# Patient Record
Sex: FEMALE | Race: WHITE | Employment: PART TIME | URBAN - METROPOLITAN AREA
[De-identification: names, ages, dates, MRNs, and addresses within clinical notes are randomized per-mention and may not be internally consistent; named-entity substitution may affect disease eponyms.]

---

## 2019-11-06 ENCOUNTER — IP HISTORICAL/CONVERTED ENCOUNTER (OUTPATIENT)
Dept: OTHER | Age: 64
End: 2019-11-06

## 2021-08-19 ENCOUNTER — APPOINTMENT (OUTPATIENT)
Dept: GENERAL RADIOLOGY | Age: 66
DRG: 177 | End: 2021-08-19
Attending: EMERGENCY MEDICINE
Payer: MEDICARE

## 2021-08-19 ENCOUNTER — APPOINTMENT (OUTPATIENT)
Dept: CT IMAGING | Age: 66
DRG: 177 | End: 2021-08-19
Attending: FAMILY MEDICINE
Payer: MEDICARE

## 2021-08-19 ENCOUNTER — HOSPITAL ENCOUNTER (INPATIENT)
Age: 66
LOS: 7 days | Discharge: HOME | DRG: 177 | End: 2021-08-26
Attending: EMERGENCY MEDICINE | Admitting: FAMILY MEDICINE
Payer: MEDICARE

## 2021-08-19 DIAGNOSIS — U07.1 COVID-19: ICD-10-CM

## 2021-08-19 DIAGNOSIS — R09.02 HYPOXIA: Primary | ICD-10-CM

## 2021-08-19 LAB
ALBUMIN SERPL-MCNC: 3 G/DL (ref 3.5–5)
ALBUMIN/GLOB SERPL: 0.8 {RATIO} (ref 1.1–2.2)
ALP SERPL-CCNC: 52 U/L (ref 45–117)
ALT SERPL-CCNC: 29 U/L (ref 12–78)
ANION GAP SERPL CALC-SCNC: 5 MMOL/L (ref 5–15)
APPEARANCE UR: ABNORMAL
ARTERIAL PATENCY WRIST A: ABNORMAL
AST SERPL W P-5'-P-CCNC: 34 U/L (ref 15–37)
BACTERIA URNS QL MICRO: NEGATIVE /HPF
BACTERIA URNS QL MICRO: NEGATIVE /HPF
BASE EXCESS BLDA CALC-SCNC: 3.5 MMOL/L (ref 0–2)
BASOPHILS # BLD: 0.1 K/UL (ref 0–0.1)
BASOPHILS NFR BLD: 1 % (ref 0–1)
BDY SITE: ABNORMAL
BILIRUB SERPL-MCNC: 0.7 MG/DL (ref 0.2–1)
BILIRUB UR QL: NEGATIVE
BUN SERPL-MCNC: 11 MG/DL (ref 6–20)
BUN/CREAT SERPL: 13 (ref 12–20)
CA-I BLD-MCNC: 8.6 MG/DL (ref 8.5–10.1)
CHLORIDE SERPL-SCNC: 106 MMOL/L (ref 97–108)
CO2 SERPL-SCNC: 29 MMOL/L (ref 21–32)
COLOR UR: YELLOW
CREAT SERPL-MCNC: 0.83 MG/DL (ref 0.55–1.02)
DIFFERENTIAL METHOD BLD: NORMAL
EOSINOPHIL # BLD: 0.1 K/UL (ref 0–0.4)
EOSINOPHIL NFR BLD: 1 % (ref 0–7)
EPAP/CPAP/PEEP, PAPEEP: 0
ERYTHROCYTE [DISTWIDTH] IN BLOOD BY AUTOMATED COUNT: 12.6 % (ref 11.5–14.5)
FIO2 ON VENT: 100 %
GAS FLOW.O2 O2 DELIVERY SYS: 15 L/MIN
GLOBULIN SER CALC-MCNC: 3.7 G/DL (ref 2–4)
GLUCOSE SERPL-MCNC: 105 MG/DL (ref 65–100)
GLUCOSE UR STRIP.AUTO-MCNC: NEGATIVE MG/DL
HCO3 BLDA-SCNC: 28 MMOL/L (ref 22–26)
HCT VFR BLD AUTO: 43.2 % (ref 35–47)
HGB BLD-MCNC: 14.2 G/DL (ref 11.5–16)
HGB UR QL STRIP: NEGATIVE
IMM GRANULOCYTES # BLD AUTO: 0 K/UL
IMM GRANULOCYTES NFR BLD AUTO: 0 %
KETONES UR QL STRIP.AUTO: NEGATIVE MG/DL
LACTATE SERPL-SCNC: 1.9 MMOL/L (ref 0.4–2)
LEUKOCYTE ESTERASE UR QL STRIP.AUTO: NEGATIVE
LYMPHOCYTES # BLD: 0.9 K/UL (ref 0.8–3.5)
LYMPHOCYTES NFR BLD: 17 % (ref 12–49)
MCH RBC QN AUTO: 29 PG (ref 26–34)
MCHC RBC AUTO-ENTMCNC: 32.9 G/DL (ref 30–36.5)
MCV RBC AUTO: 88.3 FL (ref 80–99)
MONOCYTES # BLD: 0.4 K/UL (ref 0–1)
MONOCYTES NFR BLD: 8 % (ref 5–13)
MUCOUS THREADS URNS QL MICRO: ABNORMAL /LPF
MUCOUS THREADS URNS QL MICRO: ABNORMAL /LPF
NEUTS BAND NFR BLD MANUAL: 4 % (ref 0–6)
NEUTS SEG # BLD: 3.8 K/UL (ref 1.8–8)
NEUTS SEG NFR BLD: 69 % (ref 32–75)
NITRITE UR QL STRIP.AUTO: NEGATIVE
NRBC # BLD: 0 K/UL (ref 0–0.01)
NRBC BLD-RTO: 0 PER 100 WBC
PCO2 BLDA: 40 MMHG (ref 35–45)
PH BLDA: 7.45 [PH] (ref 7.35–7.45)
PH UR STRIP: 5 [PH] (ref 5–8)
PLATELET # BLD AUTO: 269 K/UL (ref 150–400)
PMV BLD AUTO: 10.1 FL (ref 8.9–12.9)
PO2 BLDA: 105 MMHG (ref 75–100)
POTASSIUM SERPL-SCNC: 3.6 MMOL/L (ref 3.5–5.1)
PROT SERPL-MCNC: 6.7 G/DL (ref 6.4–8.2)
PROT UR STRIP-MCNC: 100 MG/DL
RBC # BLD AUTO: 4.89 M/UL (ref 3.8–5.2)
RBC #/AREA URNS HPF: ABNORMAL /HPF (ref 0–5)
RBC #/AREA URNS HPF: ABNORMAL /HPF (ref 0–5)
RBC MORPH BLD: NORMAL
SAO2 % BLD: 99 %
SAO2% DEVICE SAO2% SENSOR NAME: ABNORMAL
SODIUM SERPL-SCNC: 140 MMOL/L (ref 136–145)
SP GR UR REFRACTOMETRY: 1.02 (ref 1–1.03)
UROBILINOGEN UR QL STRIP.AUTO: 4 EU/DL (ref 0.1–1)
WBC # BLD AUTO: 5.3 K/UL (ref 3.6–11)
WBC MORPH BLD: NORMAL
WBC URNS QL MICRO: ABNORMAL /HPF (ref 0–4)
WBC URNS QL MICRO: ABNORMAL /HPF (ref 0–4)

## 2021-08-19 PROCEDURE — 3E0333Z INTRODUCTION OF ANTI-INFLAMMATORY INTO PERIPHERAL VEIN, PERCUTANEOUS APPROACH: ICD-10-PCS | Performed by: FAMILY MEDICINE

## 2021-08-19 PROCEDURE — 87040 BLOOD CULTURE FOR BACTERIA: CPT

## 2021-08-19 PROCEDURE — 74011000250 HC RX REV CODE- 250: Performed by: EMERGENCY MEDICINE

## 2021-08-19 PROCEDURE — 82803 BLOOD GASES ANY COMBINATION: CPT

## 2021-08-19 PROCEDURE — 74011000636 HC RX REV CODE- 636: Performed by: FAMILY MEDICINE

## 2021-08-19 PROCEDURE — 71045 X-RAY EXAM CHEST 1 VIEW: CPT

## 2021-08-19 PROCEDURE — 74011000250 HC RX REV CODE- 250: Performed by: FAMILY MEDICINE

## 2021-08-19 PROCEDURE — 74011250636 HC RX REV CODE- 250/636: Performed by: FAMILY MEDICINE

## 2021-08-19 PROCEDURE — 96374 THER/PROPH/DIAG INJ IV PUSH: CPT

## 2021-08-19 PROCEDURE — 96375 TX/PRO/DX INJ NEW DRUG ADDON: CPT

## 2021-08-19 PROCEDURE — 74011000258 HC RX REV CODE- 258: Performed by: FAMILY MEDICINE

## 2021-08-19 PROCEDURE — 83605 ASSAY OF LACTIC ACID: CPT

## 2021-08-19 PROCEDURE — 80053 COMPREHEN METABOLIC PANEL: CPT

## 2021-08-19 PROCEDURE — 85025 COMPLETE CBC W/AUTO DIFF WBC: CPT

## 2021-08-19 PROCEDURE — XW033E5 INTRODUCTION OF REMDESIVIR ANTI-INFECTIVE INTO PERIPHERAL VEIN, PERCUTANEOUS APPROACH, NEW TECHNOLOGY GROUP 5: ICD-10-PCS | Performed by: FAMILY MEDICINE

## 2021-08-19 PROCEDURE — 36415 COLL VENOUS BLD VENIPUNCTURE: CPT

## 2021-08-19 PROCEDURE — 81001 URINALYSIS AUTO W/SCOPE: CPT

## 2021-08-19 PROCEDURE — 65270000029 HC RM PRIVATE

## 2021-08-19 PROCEDURE — 99284 EMERGENCY DEPT VISIT MOD MDM: CPT

## 2021-08-19 PROCEDURE — 94761 N-INVAS EAR/PLS OXIMETRY MLT: CPT

## 2021-08-19 PROCEDURE — 71275 CT ANGIOGRAPHY CHEST: CPT

## 2021-08-19 PROCEDURE — 36600 WITHDRAWAL OF ARTERIAL BLOOD: CPT

## 2021-08-19 PROCEDURE — 74011250636 HC RX REV CODE- 250/636: Performed by: EMERGENCY MEDICINE

## 2021-08-19 RX ORDER — ACETAMINOPHEN 650 MG/1
660 SUPPOSITORY RECTAL
Status: DISCONTINUED | OUTPATIENT
Start: 2021-08-19 | End: 2021-08-26 | Stop reason: HOSPADM

## 2021-08-19 RX ORDER — POLYETHYLENE GLYCOL 3350 17 G/17G
17 POWDER, FOR SOLUTION ORAL DAILY PRN
Status: DISCONTINUED | OUTPATIENT
Start: 2021-08-19 | End: 2021-08-26 | Stop reason: HOSPADM

## 2021-08-19 RX ORDER — ONDANSETRON 2 MG/ML
4 INJECTION INTRAMUSCULAR; INTRAVENOUS
Status: DISCONTINUED | OUTPATIENT
Start: 2021-08-19 | End: 2021-08-26 | Stop reason: HOSPADM

## 2021-08-19 RX ORDER — DEXAMETHASONE SODIUM PHOSPHATE 4 MG/ML
4 INJECTION, SOLUTION INTRA-ARTICULAR; INTRALESIONAL; INTRAMUSCULAR; INTRAVENOUS; SOFT TISSUE EVERY 6 HOURS
Status: DISCONTINUED | OUTPATIENT
Start: 2021-08-19 | End: 2021-08-25

## 2021-08-19 RX ORDER — ENOXAPARIN SODIUM 100 MG/ML
40 INJECTION SUBCUTANEOUS DAILY
Status: DISCONTINUED | OUTPATIENT
Start: 2021-08-20 | End: 2021-08-26 | Stop reason: HOSPADM

## 2021-08-19 RX ORDER — DOXYCYCLINE 100 MG/1
1 CAPSULE ORAL 2 TIMES DAILY
COMMUNITY
Start: 2021-08-15 | End: 2021-08-26

## 2021-08-19 RX ORDER — ACETAMINOPHEN 325 MG/1
650 TABLET ORAL
Status: DISCONTINUED | OUTPATIENT
Start: 2021-08-19 | End: 2021-08-26 | Stop reason: HOSPADM

## 2021-08-19 RX ORDER — ONDANSETRON 4 MG/1
4 TABLET, ORALLY DISINTEGRATING ORAL
Status: DISCONTINUED | OUTPATIENT
Start: 2021-08-19 | End: 2021-08-26 | Stop reason: HOSPADM

## 2021-08-19 RX ORDER — AZITHROMYCIN 500 MG/1
500 TABLET, FILM COATED ORAL DAILY
Status: DISCONTINUED | OUTPATIENT
Start: 2021-08-20 | End: 2021-08-26 | Stop reason: HOSPADM

## 2021-08-19 RX ORDER — SODIUM CHLORIDE 9 MG/ML
25 INJECTION, SOLUTION INTRAVENOUS CONTINUOUS
Status: DISCONTINUED | OUTPATIENT
Start: 2021-08-19 | End: 2021-08-26 | Stop reason: HOSPADM

## 2021-08-19 RX ADMIN — SODIUM CHLORIDE 75 ML/HR: 9 INJECTION, SOLUTION INTRAVENOUS at 18:30

## 2021-08-19 RX ADMIN — REMDESIVIR 200 MG: 100 INJECTION, POWDER, LYOPHILIZED, FOR SOLUTION INTRAVENOUS at 22:01

## 2021-08-19 RX ADMIN — AZITHROMYCIN 500 MG: 500 INJECTION, POWDER, LYOPHILIZED, FOR SOLUTION INTRAVENOUS at 13:55

## 2021-08-19 RX ADMIN — METHYLPREDNISOLONE SODIUM SUCCINATE 125 MG: 125 INJECTION, POWDER, FOR SOLUTION INTRAMUSCULAR; INTRAVENOUS at 13:56

## 2021-08-19 RX ADMIN — DEXAMETHASONE SODIUM PHOSPHATE 4 MG: 4 INJECTION, SOLUTION INTRA-ARTICULAR; INTRALESIONAL; INTRAMUSCULAR; INTRAVENOUS; SOFT TISSUE at 18:29

## 2021-08-19 RX ADMIN — IOPAMIDOL 100 ML: 755 INJECTION, SOLUTION INTRAVENOUS at 16:07

## 2021-08-19 RX ADMIN — CEFTRIAXONE SODIUM: 1 INJECTION, POWDER, FOR SOLUTION INTRAMUSCULAR; INTRAVENOUS at 13:56

## 2021-08-19 NOTE — Clinical Note
Status[de-identified] INPATIENT [101]   Type of Bed: Telemetry [19]   Cardiac Monitoring Required?: Yes   Inpatient Hospitalization Certified Necessary for the Following Reasons: 9.  Other (further clarification in H&P documentation)   Admitting Diagnosis: Hypoxia [[id number]]   Admitting Physician: García Thomas [[id number]]   Attending Physician: García Thomas [[id number]]   Estimated Length of Stay: 2 Midnights   Discharge Plan[de-identified] Home with Office Follow-up

## 2021-08-19 NOTE — ED PROVIDER NOTES
EMERGENCY DEPARTMENT HISTORY AND PHYSICAL EXAM      Date: 8/19/2021  Patient Name: Lissy Campbell      History of Presenting Illness     Chief Complaint   Patient presents with    Positive For Covid-19       History Provided By: Patient    HPI: Lissy Campbell, 72 y.o. female with a past medical history significant Diagnosis of Covid presents to the ED with cc of chief complaint of shortness of breath that is progressively getting worse. Patient had oxygen saturations in the 70s. She was placed on doxycycline by urgent care 2 days prior to arrival.  Symptoms are getting worse and are made worse with activity. She denies any fever, chills, nausea, vomiting, chest pain, rash, diarrhea, headache, night sweats. Symptoms are moderate to severe at this point    There are no other complaints, changes, or physical findings at this time.     PCP: None    Current Facility-Administered Medications   Medication Dose Route Frequency Provider Last Rate Last Admin    amLODIPine (NORVASC) tablet 10 mg  10 mg Oral DAILY Anita De MD   10 mg at 08/24/21 0857    lisinopriL (PRINIVIL, ZESTRIL) tablet 10 mg  10 mg Oral DAILY Anita De MD   10 mg at 08/24/21 0858    melatonin tablet 3 mg  3 mg Oral QHS Stefan SWANSON MD   3 mg at 08/23/21 2226    benzonatate (TESSALON) capsule 200 mg  200 mg Oral TID PRN Stefan SWANSON MD        0.9% sodium chloride infusion  25 mL/hr IntraVENous CONTINUOUS AllEm jj MD 25 mL/hr at 08/22/21 0955 25 mL/hr at 08/22/21 0955    acetaminophen (TYLENOL) tablet 650 mg  650 mg Oral Q6H PRN Hernan De MD   650 mg at 08/20/21 1349    Or    acetaminophen (TYLENOL) suppository 660 mg  660 mg Rectal Q6H PRN Hernan De MD        polyethylene glycol (MIRALAX) packet 17 g  17 g Oral DAILY PRN Hernan De MD        ondansetron (ZOFRAN ODT) tablet 4 mg  4 mg Oral Q8H PRN Anita De MD   4 mg at 08/20/21 0955    Or    ondansetron (ZOFRAN) injection 4 mg  4 mg IntraVENous Q6H PRN Scott De MD        enoxaparin (LOVENOX) injection 40 mg  40 mg SubCUTAneous DAILY Anita De MD   40 mg at 08/24/21 0859    dexamethasone (DECADRON) 4 mg/mL injection 4 mg  4 mg IntraVENous Q6H Anita De MD   4 mg at 08/24/21 0514    azithromycin (ZITHROMAX) tablet 500 mg  500 mg Oral DAILY Anita De MD   500 mg at 08/24/21 0858    piperacillin-tazobactam (ZOSYN) 3.375 g in 0.9% sodium chloride (MBP/ADV) 100 mL MBP  3.375 g IntraVENous Q8H Anita De MD 25 mL/hr at 08/24/21 0514 3.375 g at 08/24/21 0514    remdesivir 100 mg in 0.9% sodium chloride 250 mL IVPB  100 mg IntraVENous Q24H Anita De  mL/hr at 08/23/21 2225 100 mg at 08/23/21 2225       Past History     Past Medical History:  History reviewed. No pertinent past medical history. Past Surgical History:  History reviewed. No pertinent surgical history. Family History:  History reviewed. No pertinent family history. Social History:  Social History     Tobacco Use    Smoking status: Never Smoker    Smokeless tobacco: Never Used   Substance Use Topics    Alcohol use: Not Currently    Drug use: Never       Allergies:  No Known Allergies      Review of Systems     Review of Systems   Constitutional: Negative. Negative for appetite change, chills, fatigue and fever. HENT: Negative. Negative for congestion and sinus pain. Eyes: Negative. Negative for pain and visual disturbance. Respiratory: Positive for shortness of breath. Negative for chest tightness. Cardiovascular: Negative. Negative for chest pain. Gastrointestinal: Negative. Negative for abdominal pain, diarrhea, nausea and vomiting. Genitourinary: Negative. Negative for difficulty urinating. No discharge   Musculoskeletal: Negative. Negative for arthralgias. Skin: Negative. Negative for rash. Neurological: Negative. Negative for weakness and headaches.    Hematological: Negative. Psychiatric/Behavioral: Negative. Negative for agitation. The patient is not nervous/anxious. All other systems reviewed and are negative. Physical Exam     Physical Exam    Lab and Diagnostic Study Results     Labs -   No results found for this or any previous visit (from the past 12 hour(s)). Radiologic Studies -   [unfilled]  CT Results  (Last 48 hours)    None        CXR Results  (Last 48 hours)    None          Medical Decision Making and ED Course   - I am the first and primary provider for this patient AND AM THE PRIMARY PROVIDER OF RECORD. - I reviewed the vital signs, available nursing notes, past medical history, past surgical history, family history and social history. - Initial assessment performed. The patients presenting problems have been discussed, and the staff are in agreement with the care plan formulated and outlined with them. I have encouraged them to ask questions as they arise throughout their visit. Vital Signs-Reviewed the patient's vital signs. Patient Vitals for the past 12 hrs:   Temp Pulse Resp BP SpO2   08/24/21 0858 98.1 °F (36.7 °C) 75 18 (!) 149/78 98 %   08/24/21 0857  75  (!) 149/78    08/24/21 0746     92 %         Records Reviewed: Nursing Notes  Presents with a diagnosis of Covid and is now hypoxic. She is on 15 L nonrebreather. Will begin sepsis work-up treating her with community-acquired pneumonia antibiotics and steroids and will defer to inpatient team for antivirals if needed.     CRITICAL CARE NOTE :    1:43 PM      IMPENDING DETERIORATION -Respiratory and Cardiovascular    ASSOCIATED RISK FACTORS - Hypoxia and Metabolic changes    MANAGEMENT- Bedside Assessment and Supervision of Care    INTERPRETATION -  Blood Gases, ECG, Blood Pressure and Cardiac Output Measures     INTERVENTIONS - hemodynamic mngmt and Metobolic interventions    CASE REVIEW - Hospitalist/Intensivist    TREATMENT RESPONSE -Stable    PERFORMED BY - Self        NOTES   :      I have spent 45 minutes of critical care time involved in lab review, consultations with specialist, family decision- making, bedside attention and documentation. During this entire length of time I was immediately available to the patient . Chau Sanchez MD    ED Course:              Provider Notes (Medical Decision Making):   Patient is a 77-year-old female who presents hypoxic with a positive diagnosis of Covid. She has not been vaccinated. She said significant increase in respiratory distress and advancement of her oxygen supplementation. MDM           Consultations:       Consultations: -  Hospitalist Consultant: Dr. Emanuel Francis: We have asked for emergent assistance with regard to this patient. We have discussed the patients HPI, ROS, PE and results this far. They will come and evaluate the patient for admission. Procedures and Critical Care       Performed by: Chau Sanchez MD  PROCEDURES:  Procedures         Disposition     Disposition: Admitted to Floor Medical Floor the case was discussed with the admitting physician Dr. Emanuel Francis    Admitted  Diagnosis     Clinical Impression:   1. Hypoxia    2. COVID-19        Attestations:    Chau Sanchez MD    Please note that this dictation was completed with [employer], the computer voice recognition software. Quite often unanticipated grammatical, syntax, homophones, and other interpretive errors are inadvertently transcribed by the computer software. Please disregard these errors. Please excuse any errors that have escaped final proofreading. Thank you.

## 2021-08-19 NOTE — ED NOTES
Attempted to call report to [facility].  Stated that nurse had just gone into a pt's room and would call back once out.

## 2021-08-20 LAB
ALBUMIN SERPL-MCNC: 2.9 G/DL (ref 3.5–5)
ALBUMIN/GLOB SERPL: 0.8 {RATIO} (ref 1.1–2.2)
ALP SERPL-CCNC: 50 U/L (ref 45–117)
ALT SERPL-CCNC: 28 U/L (ref 12–78)
ANION GAP SERPL CALC-SCNC: 7 MMOL/L (ref 5–15)
AST SERPL W P-5'-P-CCNC: 27 U/L (ref 15–37)
BASOPHILS # BLD: 0 K/UL (ref 0–0.1)
BASOPHILS NFR BLD: 0 % (ref 0–1)
BILIRUB SERPL-MCNC: 0.6 MG/DL (ref 0.2–1)
BUN SERPL-MCNC: 11 MG/DL (ref 6–20)
BUN/CREAT SERPL: 13 (ref 12–20)
CA-I BLD-MCNC: 8.5 MG/DL (ref 8.5–10.1)
CHLORIDE SERPL-SCNC: 109 MMOL/L (ref 97–108)
CO2 SERPL-SCNC: 26 MMOL/L (ref 21–32)
CREAT SERPL-MCNC: 0.84 MG/DL (ref 0.55–1.02)
DIFFERENTIAL METHOD BLD: ABNORMAL
EOSINOPHIL # BLD: 0 K/UL (ref 0–0.4)
EOSINOPHIL NFR BLD: 0 % (ref 0–7)
ERYTHROCYTE [DISTWIDTH] IN BLOOD BY AUTOMATED COUNT: 12.4 % (ref 11.5–14.5)
GLOBULIN SER CALC-MCNC: 3.8 G/DL (ref 2–4)
GLUCOSE SERPL-MCNC: 164 MG/DL (ref 65–100)
HCT VFR BLD AUTO: 43.3 % (ref 35–47)
HGB BLD-MCNC: 14.1 G/DL (ref 11.5–16)
IMM GRANULOCYTES # BLD AUTO: 0 K/UL
IMM GRANULOCYTES NFR BLD AUTO: 0 %
LYMPHOCYTES # BLD: 1.1 K/UL (ref 0.8–3.5)
LYMPHOCYTES NFR BLD: 16 % (ref 12–49)
MCH RBC QN AUTO: 28.8 PG (ref 26–34)
MCHC RBC AUTO-ENTMCNC: 32.6 G/DL (ref 30–36.5)
MCV RBC AUTO: 88.4 FL (ref 80–99)
MONOCYTES # BLD: 0.4 K/UL (ref 0–1)
MONOCYTES NFR BLD: 5 % (ref 5–13)
NEUTS SEG # BLD: 5.6 K/UL (ref 1.8–8)
NEUTS SEG NFR BLD: 79 % (ref 32–75)
NRBC # BLD: 0 K/UL (ref 0–0.01)
NRBC BLD-RTO: 0 PER 100 WBC
PLATELET # BLD AUTO: 298 K/UL (ref 150–400)
PMV BLD AUTO: 10.4 FL (ref 8.9–12.9)
POTASSIUM SERPL-SCNC: 4 MMOL/L (ref 3.5–5.1)
PROT SERPL-MCNC: 6.7 G/DL (ref 6.4–8.2)
RBC # BLD AUTO: 4.9 M/UL (ref 3.8–5.2)
RBC MORPH BLD: ABNORMAL
SODIUM SERPL-SCNC: 142 MMOL/L (ref 136–145)
WBC # BLD AUTO: 7.1 K/UL (ref 3.6–11)

## 2021-08-20 PROCEDURE — 36415 COLL VENOUS BLD VENIPUNCTURE: CPT

## 2021-08-20 PROCEDURE — 74011000258 HC RX REV CODE- 258: Performed by: FAMILY MEDICINE

## 2021-08-20 PROCEDURE — 74011250637 HC RX REV CODE- 250/637: Performed by: FAMILY MEDICINE

## 2021-08-20 PROCEDURE — 85025 COMPLETE CBC W/AUTO DIFF WBC: CPT

## 2021-08-20 PROCEDURE — 77010033678 HC OXYGEN DAILY

## 2021-08-20 PROCEDURE — 74011000250 HC RX REV CODE- 250: Performed by: FAMILY MEDICINE

## 2021-08-20 PROCEDURE — 65270000029 HC RM PRIVATE

## 2021-08-20 PROCEDURE — 94760 N-INVAS EAR/PLS OXIMETRY 1: CPT

## 2021-08-20 PROCEDURE — 80053 COMPREHEN METABOLIC PANEL: CPT

## 2021-08-20 PROCEDURE — 74011250636 HC RX REV CODE- 250/636: Performed by: FAMILY MEDICINE

## 2021-08-20 RX ADMIN — DEXAMETHASONE SODIUM PHOSPHATE 4 MG: 4 INJECTION, SOLUTION INTRA-ARTICULAR; INTRALESIONAL; INTRAMUSCULAR; INTRAVENOUS; SOFT TISSUE at 23:45

## 2021-08-20 RX ADMIN — ENOXAPARIN SODIUM 40 MG: 40 INJECTION SUBCUTANEOUS at 09:55

## 2021-08-20 RX ADMIN — PIPERACILLIN SODIUM AND TAZOBACTAM SODIUM 3.38 G: 3; .375 INJECTION, POWDER, LYOPHILIZED, FOR SOLUTION INTRAVENOUS at 09:55

## 2021-08-20 RX ADMIN — DEXAMETHASONE SODIUM PHOSPHATE 4 MG: 4 INJECTION, SOLUTION INTRA-ARTICULAR; INTRALESIONAL; INTRAMUSCULAR; INTRAVENOUS; SOFT TISSUE at 12:14

## 2021-08-20 RX ADMIN — PIPERACILLIN SODIUM AND TAZOBACTAM SODIUM 3.38 G: 3; .375 INJECTION, POWDER, LYOPHILIZED, FOR SOLUTION INTRAVENOUS at 17:00

## 2021-08-20 RX ADMIN — DEXAMETHASONE SODIUM PHOSPHATE 4 MG: 4 INJECTION, SOLUTION INTRA-ARTICULAR; INTRALESIONAL; INTRAMUSCULAR; INTRAVENOUS; SOFT TISSUE at 17:00

## 2021-08-20 RX ADMIN — PIPERACILLIN SODIUM AND TAZOBACTAM SODIUM 3.38 G: 3; .375 INJECTION, POWDER, LYOPHILIZED, FOR SOLUTION INTRAVENOUS at 00:31

## 2021-08-20 RX ADMIN — ONDANSETRON 4 MG: 4 TABLET, ORALLY DISINTEGRATING ORAL at 09:55

## 2021-08-20 RX ADMIN — AZITHROMYCIN MONOHYDRATE 500 MG: 500 TABLET ORAL at 09:55

## 2021-08-20 RX ADMIN — REMDESIVIR 200 MG: 100 INJECTION, POWDER, LYOPHILIZED, FOR SOLUTION INTRAVENOUS at 21:38

## 2021-08-20 RX ADMIN — ACETAMINOPHEN 650 MG: 325 TABLET ORAL at 09:55

## 2021-08-20 RX ADMIN — PIPERACILLIN SODIUM AND TAZOBACTAM SODIUM 3.38 G: 3; .375 INJECTION, POWDER, LYOPHILIZED, FOR SOLUTION INTRAVENOUS at 23:44

## 2021-08-20 RX ADMIN — DEXAMETHASONE SODIUM PHOSPHATE 4 MG: 4 INJECTION, SOLUTION INTRA-ARTICULAR; INTRALESIONAL; INTRAMUSCULAR; INTRAVENOUS; SOFT TISSUE at 05:22

## 2021-08-20 RX ADMIN — DEXAMETHASONE SODIUM PHOSPHATE 4 MG: 4 INJECTION, SOLUTION INTRA-ARTICULAR; INTRALESIONAL; INTRAMUSCULAR; INTRAVENOUS; SOFT TISSUE at 00:30

## 2021-08-20 NOTE — PROGRESS NOTES
PULMONARY CONSULT  [facility]    Name: Ilya Street MRN: [id number]   : 1955 Hospital: [address] [place] [address]   Date: 2021  Admission date: 2021 Hospital Day: 2       HPI:     Hospital Problems  Never Reviewed        Codes Class Noted POA    Hypoxia ICD-10-CM: R09.02  ICD-9-CM: 799.02  2021 Unknown        COVID-19 ICD-10-CM: U07.1  ICD-9-CM: 079.89  2021 Unknown                   [x] High complexity decision making was performed  [x] See my orders for details      Subjective/Initial History:     I was asked by Gerald Elliott MD to see Ilya Street  a 72 y.o.  female in consultation     Excerpts from admission 2021 or consult notes as follows:   Ms. Gerald Prince is a 73 yo F came to the ER d/t SOB, fever, fatigue and generalised weakness. Pt was admitted for COVID19 Pneumonitis -  CXR shows B/L peripheral infiltrates consistent w/ diagnosis. Pulmo consulted for Management. Pt seen in room  Alert awake says she has mild chest tightness but much improved from admission O2sat 93% on O2 -5L     No Known Allergies     MAR reviewed and pertinent medications noted or modified as needed     Current Facility-Administered Medications   Medication    0.9% sodium chloride infusion    acetaminophen (TYLENOL) tablet 650 mg    Or    acetaminophen (TYLENOL) suppository 660 mg    polyethylene glycol (MIRALAX) packet 17 g    ondansetron (ZOFRAN ODT) tablet 4 mg    Or    ondansetron (ZOFRAN) injection 4 mg    enoxaparin (LOVENOX) injection 40 mg    dexamethasone (DECADRON) 4 mg/mL injection 4 mg    azithromycin (ZITHROMAX) tablet 500 mg    piperacillin-tazobactam (ZOSYN) 3.375 g in 0.9% sodium chloride (MBP/ADV) 100 mL MBP    remdesivir 200 mg in 0.9% sodium chloride 250 mL IVPB    remdesivir 100 mg in 0.9% sodium chloride 250 mL IVPB      Patient PCP: None  PMH:  has no past medical history on file. PSH:   has no past surgical history on file. FHX: family history is not on file. SHX:  reports that she has never smoked. She has never used smokeless tobacco. She reports previous alcohol use. She reports that she does not use drugs. ROS:    ROS     Objective:     Vital Signs: Telemetry:    normal sinus rhythm Intake/Output:   Visit Vitals  BP (!) 140/73   Pulse 79   Temp 98.6 °F (37 °C)   Resp 16   Ht 5' 8\" (1.727 m)   Wt 311 lb (141.1 kg)   SpO2 93%   BMI 47.29 kg/m²       Temp (24hrs), Av °F (36.7 °C), Min:97.2 °F (36.2 °C), Max:98.6 °F (37 °C)        O2 Device: Non-rebreather mask O2 Flow Rate (L/min): 10 l/min       Wt Readings from Last 4 Encounters:   21 311 lb (141.1 kg)        No intake or output data in the 24 hours ending 21 1017    Last shift:      No intake/output data recorded. Last 3 shifts: No intake/output data recorded. Physical Exam:     Physical Exam     Labs:    Recent Labs     21  0800 21  1340   WBC 7.1 5.3   HGB 14.1 14.2    269     Recent Labs     21  0800 21  1340    140   K 4.0 3.6   * 106   CO2 26 29   * 105*   BUN 11 11   CREA 0.84 0.83   CA 8.5 8.6   LAC  --  1.9   ALB 2.9* 3.0*   ALT 28 29     Recent Labs     21  1705   PH 7.45   PCO2 40   PO2 105*   HCO3 28*   FIO2 100.0     No results for input(s): CPK, CKNDX, TROIQ in the last 72 hours. No lab exists for component: CPKMB  No results found for: BNPP, BNP   Lab Results   Component Value Date/Time    Culture result: No growth after 20 hours 2021 01:40 PM   No results found for: TSH, TSHEXT    Imaging:    CXR Results  (Last 48 hours)               21 1354  XR CHEST PORT Final result    Impression:  Bilateral peripheral infiltrates consistent with Covid pneumonia. Narrative:  Portable upright radiograph of the chest 1:45 p.m. no prior study. INDICATION: Covid positive, hypoxia. Heart size is at the upper limits of normal with a tortuous aorta.  Bilateral peripheral infiltrates are noted consistent with history of Covid positive   status. No effusion or pneumothorax. No acute bony findings. Results from [facility] [place] encounter on 08/19/21    XR CHEST PORT    Narrative  Portable upright radiograph of the chest 1:45 p.m. no prior study. INDICATION: Covid positive, hypoxia. Heart size is at the upper limits of normal with a tortuous aorta. Bilateral  peripheral infiltrates are noted consistent with history of Covid positive  status. No effusion or pneumothorax. No acute bony findings. Impression  Bilateral peripheral infiltrates consistent with Covid pneumonia. Results from [place] [facility] encounter on 08/19/21    CTA CHEST W OR W WO CONT    Narrative  CTA chest.    Axial images are reviewed along with reformatted sagittal/coronal/MIP images. 100 mL Isovue 370 administered. Dose reduction: All CT scans at this facility are performed using dose reduction  optimization techniques as appropriate to a performed exam including the  following-  automated exposure control, adjustments of mA and/or Kv according to patient  size, or use of iterative reconstructive technique. Geographic groundglass opacities present throughout the lungs. Findings in  keeping with infectious/inflammatory process including viral etiologies. No  pleural effusion. Enhanced images demonstrate normal contrast opacification, normal caliber  pulmonary arterial trunk. Central pulmonary arterial branch vessels normally  opacify; no CT evidence for PE. Pulsatile artifact ascending thoracic aorta. Otherwise, thoracic aortic arch, great vessels traversing the superior  mediastinum, and ascending thoracic aorta normally opacify. Imaged content upper abdomen reveals 2 cm gallstone gallbladder lumen. Impression  Geographic groundglass opacities throughout the lungs. Consider  infectious/inflammatory process including viral etiologies.     No CT evidence for PE.    Cholelithiasis. IMPRESSION:   1. COVID Pneumonia -  8/19 CXR shows B/L peripheral infiltrates consistent w/dx  2. Acute hypoxic respiratory failure   3. Body mass index is 47.29 kg/m². 4. Pt is requiring Drug therapy requiring intensive monitoring for toxicity  5. Pt is stable but Prognosis guarded       RECOMMENDATIONS/PLAN:     1. BIPAP for non invasive ventilatory life support to prevent worsening respiratory acidosis  2. Nasal Cannula oxygen - O2sat 93% on O2 -5L    3. Supplemental O2 to keep sats > 93%  4. Remdesivir 200 mg in 0.9% sodium chloride 250 mL  5. Zosyn, Decadron, Zithromax   6. Aspiration precautions  7. Labs to follow electrolytes, renal function and and blood counts  8. Glucose monitoring and SSI  9. Bronchial hygiene with respiratory therapy techniques, bronchodilators  10. DVT, SUP prophylaxis- Levonox       This care involved high complexity medical decision making: I personally:  · Reviewed the flowsheet and previous days notes  · Reviewed and summarized records or history from previous days note or discussions with staff, family  · High Risk Drug therapy requiring intensive monitoring for toxicity: eg steroids, pressors, antibiotics  · Reviewed and/or ordered Clinical lab tests  · Reviewed images and/or ordered Radiology tests  · Reviewed the patients ECG / Telemetry  · Reviewed and/or adjusted NiPPV settings  · Called and arranged for Radiologic procedures or interventions  · performed or ordered Diagnostic endoscopies with identified risk factors.   · discussed my assessment/management with : Nursing, Hospitalist and Family for coordination of care          Atul Live

## 2021-08-20 NOTE — PROGRESS NOTES
Problem: Falls - Risk of  Goal: *Absence of Falls  Description: Document Serene Holman Fall Risk and appropriate interventions in the flowsheet. Outcome: Progressing Towards Goal  Note: Fall Risk Interventions:        Place the hospital bed in low position when a patient is resting in bed; raise bed to a comfortable height when the patient is transferring out of bed. Keep hospital bed brakes locked. Keep wheelchair wheel locks in \"locked\" position when stationary. Keep nonslip, comfortable, well-fitting footwear on the patient.

## 2021-08-20 NOTE — PROGRESS NOTES
General Daily Progress Note          Patient Name:   Mi Borrego       YOB: 1955       Age:  72 y.o.       Admit Date: 8/19/2021      Subjective:       Patient on Ventimask breathing little better           Objective:     Visit Vitals  /65   Pulse 66   Temp 98 °F (36.7 °C)   Resp 22   Ht 5' 8\" (1.727 m)   Wt 141.1 kg (311 lb)   SpO2 95%   BMI 47.29 kg/m²        Recent Results (from the past 24 hour(s))   URINALYSIS W/ RFLX MICROSCOPIC    Collection Time: 08/19/21  4:05 PM   Result Value Ref Range    Color Yellow      Appearance Turbid (A) Clear      Specific gravity 1.019 1.003 - 1.030      pH (UA) 5.0 5.0 - 8.0      Protein 100 (A) Negative mg/dL    Glucose Negative Negative mg/dL    Ketone Negative Negative mg/dL    Bilirubin Negative Negative      Blood Negative Negative      Urobilinogen 4.0 (H) 0.1 - 1.0 EU/dL    Nitrites Negative Negative      Leukocyte Esterase Negative Negative      WBC 0-4 0 - 4 /hpf    RBC 0-5 0 - 5 /hpf    Bacteria Negative Negative /hpf    Mucus Trace /lpf   URINE MICROSCOPIC    Collection Time: 08/19/21  4:05 PM   Result Value Ref Range    WBC 0-4 0 - 4 /hpf    RBC 0-5 0 - 5 /hpf    Bacteria Negative Negative /hpf    Mucus Trace (A) Negative /lpf   BLOOD GAS, ARTERIAL    Collection Time: 08/19/21  5:05 PM   Result Value Ref Range    pH 7.45 7.35 - 7.45      PCO2 40 35 - 45 mmHg    PO2 105 (H) 75 - 100 mmHg    O2 SAT 99 >95 %    BICARBONATE 28 (H) 22 - 26 mmol/L    BASE EXCESS 3.5 (H) 0 - 2 mmol/L    O2 METHOD Non-rebreathing mask      O2 FLOW RATE 15 L/min    FIO2 100.0 %    EPAP/CPAP/PEEP 0      SITE Right Brachial      ARCENIO'S TEST PASS     METABOLIC PANEL, COMPREHENSIVE    Collection Time: 08/20/21  8:00 AM   Result Value Ref Range    Sodium 142 136 - 145 mmol/L    Potassium 4.0 3.5 - 5.1 mmol/L    Chloride 109 (H) 97 - 108 mmol/L    CO2 26 21 - 32 mmol/L    Anion gap 7 5 - 15 mmol/L    Glucose 164 (H) 65 - 100 mg/dL    BUN 11 6 - 20 mg/dL    Creatinine 0. 84 0.55 - 1.02 mg/dL    BUN/Creatinine ratio 13 12 - 20      GFR est AA >60 >60 ml/min/1.73m2    GFR est non-AA >60 >60 ml/min/1.73m2    Calcium 8.5 8.5 - 10.1 mg/dL    Bilirubin, total 0.6 0.2 - 1.0 mg/dL    AST (SGOT) 27 15 - 37 U/L    ALT (SGPT) 28 12 - 78 U/L    Alk. phosphatase 50 45 - 117 U/L    Protein, total 6.7 6.4 - 8.2 g/dL    Albumin 2.9 (L) 3.5 - 5.0 g/dL    Globulin 3.8 2.0 - 4.0 g/dL    A-G Ratio 0.8 (L) 1.1 - 2.2     CBC WITH AUTOMATED DIFF    Collection Time: 08/20/21  8:00 AM   Result Value Ref Range    WBC 7.1 3.6 - 11.0 K/uL    RBC 4.90 3.80 - 5.20 M/uL    HGB 14.1 11.5 - 16.0 g/dL    HCT 43.3 35.0 - 47.0 %    MCV 88.4 80.0 - 99.0 FL    MCH 28.8 26.0 - 34.0 PG    MCHC 32.6 30.0 - 36.5 g/dL    RDW 12.4 11.5 - 14.5 %    PLATELET 054 449 - 979 K/uL    MPV 10.4 8.9 - 12.9 FL    NRBC 0.0 0.0  WBC    ABSOLUTE NRBC 0.00 0.00 - 0.01 K/uL    NEUTROPHILS 79 (H) 32 - 75 %    LYMPHOCYTES 16 12 - 49 %    MONOCYTES 5 5 - 13 %    EOSINOPHILS 0 0 - 7 %    BASOPHILS 0 0 - 1 %    IMMATURE GRANULOCYTES 0 %    ABS. NEUTROPHILS 5.6 1.8 - 8.0 K/UL    ABS. LYMPHOCYTES 1.1 0.8 - 3.5 K/UL    ABS. MONOCYTES 0.4 0.0 - 1.0 K/UL    ABS. EOSINOPHILS 0.0 0.0 - 0.4 K/UL    ABS. BASOPHILS 0.0 0.0 - 0.1 K/UL    ABS. IMM. GRANS. 0.0 K/UL    DF Manual      RBC COMMENTS Normocytic, Normochromic       [unfilled]      Review of Systems    Constitutional: Negative for chills and fever. HENT: Negative. Eyes: Negative. Respiratory: Negative. Cardiovascular: Negative. Gastrointestinal: Negative for abdominal pain and nausea. Skin: Negative. Neurological: Negative. Physical Exam:      Constitutional: pt is oriented to person, place, and time. HENT:   Head: Normocephalic and atraumatic. Eyes: Pupils are equal, round, and reactive to light. EOM are normal.   Cardiovascular: Normal rate, regular rhythm and normal heart sounds. Pulmonary/Chest: Breath sounds normal. No wheezes. No rales.    Exhibits no tenderness. Abdominal: Soft. Bowel sounds are normal. There is no abdominal tenderness. There is no rebound and no guarding. Musculoskeletal: Normal range of motion. Neurological: pt is alert and oriented to person, place, and time. CTA CHEST W OR W WO CONT   Final Result   Geographic groundglass opacities throughout the lungs. Consider   infectious/inflammatory process including viral etiologies. No CT evidence for PE. Cholelithiasis. XR CHEST PORT   Final Result   Bilateral peripheral infiltrates consistent with Covid pneumonia.            Recent Results (from the past 24 hour(s))   URINALYSIS W/ RFLX MICROSCOPIC    Collection Time: 08/19/21  4:05 PM   Result Value Ref Range    Color Yellow      Appearance Turbid (A) Clear      Specific gravity 1.019 1.003 - 1.030      pH (UA) 5.0 5.0 - 8.0      Protein 100 (A) Negative mg/dL    Glucose Negative Negative mg/dL    Ketone Negative Negative mg/dL    Bilirubin Negative Negative      Blood Negative Negative      Urobilinogen 4.0 (H) 0.1 - 1.0 EU/dL    Nitrites Negative Negative      Leukocyte Esterase Negative Negative      WBC 0-4 0 - 4 /hpf    RBC 0-5 0 - 5 /hpf    Bacteria Negative Negative /hpf    Mucus Trace /lpf   URINE MICROSCOPIC    Collection Time: 08/19/21  4:05 PM   Result Value Ref Range    WBC 0-4 0 - 4 /hpf    RBC 0-5 0 - 5 /hpf    Bacteria Negative Negative /hpf    Mucus Trace (A) Negative /lpf   BLOOD GAS, ARTERIAL    Collection Time: 08/19/21  5:05 PM   Result Value Ref Range    pH 7.45 7.35 - 7.45      PCO2 40 35 - 45 mmHg    PO2 105 (H) 75 - 100 mmHg    O2 SAT 99 >95 %    BICARBONATE 28 (H) 22 - 26 mmol/L    BASE EXCESS 3.5 (H) 0 - 2 mmol/L    O2 METHOD Non-rebreathing mask      O2 FLOW RATE 15 L/min    FIO2 100.0 %    EPAP/CPAP/PEEP 0      SITE Right Brachial      ARCENIO'S TEST PASS     METABOLIC PANEL, COMPREHENSIVE    Collection Time: 08/20/21  8:00 AM   Result Value Ref Range    Sodium 142 136 - 145 mmol/L    Potassium 4.0 3.5 - 5.1 mmol/L    Chloride 109 (H) 97 - 108 mmol/L    CO2 26 21 - 32 mmol/L    Anion gap 7 5 - 15 mmol/L    Glucose 164 (H) 65 - 100 mg/dL    BUN 11 6 - 20 mg/dL    Creatinine 0.84 0.55 - 1.02 mg/dL    BUN/Creatinine ratio 13 12 - 20      GFR est AA >60 >60 ml/min/1.73m2    GFR est non-AA >60 >60 ml/min/1.73m2    Calcium 8.5 8.5 - 10.1 mg/dL    Bilirubin, total 0.6 0.2 - 1.0 mg/dL    AST (SGOT) 27 15 - 37 U/L    ALT (SGPT) 28 12 - 78 U/L    Alk. phosphatase 50 45 - 117 U/L    Protein, total 6.7 6.4 - 8.2 g/dL    Albumin 2.9 (L) 3.5 - 5.0 g/dL    Globulin 3.8 2.0 - 4.0 g/dL    A-G Ratio 0.8 (L) 1.1 - 2.2     CBC WITH AUTOMATED DIFF    Collection Time: 08/20/21  8:00 AM   Result Value Ref Range    WBC 7.1 3.6 - 11.0 K/uL    RBC 4.90 3.80 - 5.20 M/uL    HGB 14.1 11.5 - 16.0 g/dL    HCT 43.3 35.0 - 47.0 %    MCV 88.4 80.0 - 99.0 FL    MCH 28.8 26.0 - 34.0 PG    MCHC 32.6 30.0 - 36.5 g/dL    RDW 12.4 11.5 - 14.5 %    PLATELET 070 729 - 523 K/uL    MPV 10.4 8.9 - 12.9 FL    NRBC 0.0 0.0  WBC    ABSOLUTE NRBC 0.00 0.00 - 0.01 K/uL    NEUTROPHILS 79 (H) 32 - 75 %    LYMPHOCYTES 16 12 - 49 %    MONOCYTES 5 5 - 13 %    EOSINOPHILS 0 0 - 7 %    BASOPHILS 0 0 - 1 %    IMMATURE GRANULOCYTES 0 %    ABS. NEUTROPHILS 5.6 1.8 - 8.0 K/UL    ABS. LYMPHOCYTES 1.1 0.8 - 3.5 K/UL    ABS. MONOCYTES 0.4 0.0 - 1.0 K/UL    ABS. EOSINOPHILS 0.0 0.0 - 0.4 K/UL    ABS. BASOPHILS 0.0 0.0 - 0.1 K/UL    ABS. IMM.  GRANS. 0.0 K/UL    DF Manual      RBC COMMENTS Normocytic, Normochromic         Results     Procedure Component Value Units Date/Time    CULTURE, BLOOD #2 [[id number]] Collected: 08/19/21 2128    Order Status: Completed Specimen: Blood Updated: 08/19/21 2215    CULTURE, BLOOD #1 [[id number]] Collected: 08/19/21 2125    Order Status: Completed Specimen: Blood Updated: 08/19/21 2214    CULTURE, BLOOD, PAIRED [[id number]] Collected: 08/19/21 1340    Order Status: Completed Specimen: Blood Updated: 08/20/21 1010     Special Requests: No Special Requests        Culture result: No growth after 20 hours              Labs:     Recent Labs     08/20/21  0800 08/19/21  1340   WBC 7.1 5.3   HGB 14.1 14.2   HCT 43.3 43.2    269     Recent Labs     08/20/21  0800 08/19/21  1340    140   K 4.0 3.6   * 106   CO2 26 29   BUN 11 11   CREA 0.84 0.83   * 105*   CA 8.5 8.6     Recent Labs     08/20/21  0800 08/19/21  1340   ALT 28 29   AP 50 52   TBILI 0.6 0.7   TP 6.7 6.7   ALB 2.9* 3.0*   GLOB 3.8 3.7     No results for input(s): INR, PTP, APTT, INREXT in the last 72 hours. No results for input(s): FE, TIBC, PSAT, FERR in the last 72 hours. No results found for: FOL, RBCF   Recent Labs     08/19/21  1705   PH 7.45   PCO2 40   PO2 105*     No results for input(s): CPK, CKNDX, TROIQ in the last 72 hours.     No lab exists for component: CPKMB  No results found for: CHOL, CHOLX, CHLST, CHOLV, HDL, HDLP, LDL, LDLC, DLDLP, TGLX, TRIGL, TRIGP, CHHD, CHHDX  No results found for: [facility]  Lab Results   Component Value Date/Time    Color Yellow 08/19/2021 04:05 PM    Appearance Turbid (A) 08/19/2021 04:05 PM    Specific gravity 1.019 08/19/2021 04:05 PM    pH (UA) 5.0 08/19/2021 04:05 PM    Protein 100 (A) 08/19/2021 04:05 PM    Glucose Negative 08/19/2021 04:05 PM    Ketone Negative 08/19/2021 04:05 PM    Bilirubin Negative 08/19/2021 04:05 PM    Urobilinogen 4.0 (H) 08/19/2021 04:05 PM    Nitrites Negative 08/19/2021 04:05 PM    Leukocyte Esterase Negative 08/19/2021 04:05 PM    Bacteria Negative 08/19/2021 04:05 PM    Bacteria Negative 08/19/2021 04:05 PM    WBC 0-4 08/19/2021 04:05 PM    WBC 0-4 08/19/2021 04:05 PM    RBC 0-5 08/19/2021 04:05 PM    RBC 0-5 08/19/2021 04:05 PM         Assessment:       Acute hypoxemic respiratory failure  COVID-19 pneumonitis    Plan:       Continue Zithromax IV Zosyn remdesivir and Decadron    Follow-up with the pulmonology      Current Facility-Administered Medications:     0.9% sodium chloride infusion, 75 mL/hr, IntraVENous, CONTINUOUS, Anita De MD, Last Rate: 75 mL/hr at 08/19/21 1830, 75 mL/hr at 08/19/21 1830    acetaminophen (TYLENOL) tablet 650 mg, 650 mg, Oral, Q6H PRN, 650 mg at 08/20/21 0955 **OR** acetaminophen (TYLENOL) suppository 660 mg, 660 mg, Rectal, Q6H PRN, Mohiuddin, Laban Mcardle, MD    polyethylene glycol (MIRALAX) packet 17 g, 17 g, Oral, DAILY PRN, Mohiuddin, Laban Mcardle, MD    ondansetron (ZOFRAN ODT) tablet 4 mg, 4 mg, Oral, Q8H PRN, 4 mg at 08/20/21 0955 **OR** ondansetron (ZOFRAN) injection 4 mg, 4 mg, IntraVENous, Q6H PRN, Anita De MD    enoxaparin (LOVENOX) injection 40 mg, 40 mg, SubCUTAneous, DAILY, Anita De MD, 40 mg at 08/20/21 0955    dexamethasone (DECADRON) 4 mg/mL injection 4 mg, 4 mg, IntraVENous, Q6H, Anita De MD, 4 mg at 08/20/21 1214    azithromycin (ZITHROMAX) tablet 500 mg, 500 mg, Oral, DAILY, Anita De MD, 500 mg at 08/20/21 0955    piperacillin-tazobactam (ZOSYN) 3.375 g in 0.9% sodium chloride (MBP/ADV) 100 mL MBP, 3.375 g, IntraVENous, Q8H, Anita De MD, Last Rate: 25 mL/hr at 08/20/21 0955, 3.375 g at 08/20/21 0955    remdesivir 200 mg in 0.9% sodium chloride 250 mL IVPB, 200 mg, IntraVENous, Q24H, Anita De MD, Last Rate: 500 mL/hr at 08/19/21 2201, 200 mg at 08/19/21 2201    remdesivir 100 mg in 0.9% sodium chloride 250 mL IVPB, 100 mg, IntraVENous, Q24H, Mohiuddin, Laban Mcardle, MD

## 2021-08-20 NOTE — H&P
History and Physical    NAME: Jaylan Paredes   :  1955   MRN:  [id number]     Date/Time:  2021 9:47 PM    Patient PCP: None  ______________________________________________________________________             Subjective:     CHIEF COMPLAINT:   Shortness of breath      HISTORY OF PRESENT ILLNESS:       Patient is a 72y.o. year old female with no past medical history came to emergency room because of generalized weakness shortness of breath generalized weakness shortness of breath fever malaise patient was seen by the urgent care 2 days ago started on doxycycline but the condition get more worse came to emergency room seen by the ER physician, the patient to be admitted for COVID-19 pneumonitis    History reviewed. No pertinent past medical history. None    History reviewed. No pertinent surgical history. Social History     Tobacco Use    Smoking status: Never Smoker    Smokeless tobacco: Never Used   Substance Use Topics    Alcohol use: Not Currently        History reviewed. No pertinent family history. No Known Allergies     Prior to Admission medications    Medication Sig Start Date End Date Taking? Authorizing Provider   doxycycline (VIBRAMYCIN) 100 mg capsule Take 1 Capsule by mouth two (2) times a day.  8/15/21  Yes Provider, Historical         Current Facility-Administered Medications:     0.9% sodium chloride infusion, 75 mL/hr, IntraVENous, CONTINUOUS, Anita De MD, Last Rate: 75 mL/hr at 21 1830, 75 mL/hr at 21 1830    acetaminophen (TYLENOL) tablet 650 mg, 650 mg, Oral, Q6H PRN **OR** acetaminophen (TYLENOL) suppository 660 mg, 660 mg, Rectal, Q6H PRN, Anita De MD    polyethylene glycol (MIRALAX) packet 17 g, 17 g, Oral, DAILY PRN, Anita De MD    ondansetron (ZOFRAN ODT) tablet 4 mg, 4 mg, Oral, Q8H PRN **OR** ondansetron (ZOFRAN) injection 4 mg, 4 mg, IntraVENous, Q6H PRN, Anita De MD    [START ON 2021] enoxaparin (LOVENOX) injection 40 mg, 40 mg, SubCUTAneous, DAILY, Marivel De MD    dexamethasone (DECADRON) 4 mg/mL injection 4 mg, 4 mg, IntraVENous, Q6H, Anita De MD, 4 mg at 08/19/21 1829    [START ON 8/20/2021] azithromycin (ZITHROMAX) tablet 500 mg, 500 mg, Oral, DAILY, Marivel De MD    piperacillin-tazobactam (ZOSYN) 3.375 g in 0.9% sodium chloride (MBP/ADV) 100 mL MBP, 3.375 g, IntraVENous, Q8H, Anita De MD    remdesivir 200 mg in 0.9% sodium chloride 250 mL IVPB, 200 mg, IntraVENous, Q24H, Marivel De MD    [START ON 8/20/2021] remdesivir 100 mg in 0.9% sodium chloride 250 mL IVPB, 100 mg, IntraVENous, Q24H, Anita De MD    LAB DATA REVIEWED:    Recent Results (from the past 24 hour(s))   CBC WITH AUTOMATED DIFF    Collection Time: 08/19/21  1:40 PM   Result Value Ref Range    WBC 5.3 3.6 - 11.0 K/uL    RBC 4.89 3.80 - 5.20 M/uL    HGB 14.2 11.5 - 16.0 g/dL    HCT 43.2 35.0 - 47.0 %    MCV 88.3 80.0 - 99.0 FL    MCH 29.0 26.0 - 34.0 PG    MCHC 32.9 30.0 - 36.5 g/dL    RDW 12.6 11.5 - 14.5 %    PLATELET 825 658 - 151 K/uL    MPV 10.1 8.9 - 12.9 FL    NRBC 0.0 0.0  WBC    ABSOLUTE NRBC 0.00 0.00 - 0.01 K/uL    NEUTROPHILS 69 32 - 75 %    BAND NEUTROPHILS 4 0 - 6 %    LYMPHOCYTES 17 12 - 49 %    MONOCYTES 8 5 - 13 %    EOSINOPHILS 1 0 - 7 %    BASOPHILS 1 0 - 1 %    IMMATURE GRANULOCYTES 0 %    ABS. NEUTROPHILS 3.8 1.8 - 8.0 K/UL    ABS. LYMPHOCYTES 0.9 0.8 - 3.5 K/UL    ABS. MONOCYTES 0.4 0.0 - 1.0 K/UL    ABS. EOSINOPHILS 0.1 0.0 - 0.4 K/UL    ABS. BASOPHILS 0.1 0.0 - 0.1 K/UL    ABS. IMM.  GRANS. 0.0 K/UL    DF Manual      RBC COMMENTS Normocytic, Normochromic      WBC COMMENTS Reactive Lymphs     METABOLIC PANEL, COMPREHENSIVE    Collection Time: 08/19/21  1:40 PM   Result Value Ref Range    Sodium 140 136 - 145 mmol/L    Potassium 3.6 3.5 - 5.1 mmol/L    Chloride 106 97 - 108 mmol/L    CO2 29 21 - 32 mmol/L    Anion gap 5 5 - 15 mmol/L    Glucose 105 (H) 65 - 100 mg/dL BUN 11 6 - 20 mg/dL    Creatinine 0.83 0.55 - 1.02 mg/dL    BUN/Creatinine ratio 13 12 - 20      GFR est AA >60 >60 ml/min/1.73m2    GFR est non-AA >60 >60 ml/min/1.73m2    Calcium 8.6 8.5 - 10.1 mg/dL    Bilirubin, total 0.7 0.2 - 1.0 mg/dL    AST (SGOT) 34 15 - 37 U/L    ALT (SGPT) 29 12 - 78 U/L    Alk.  phosphatase 52 45 - 117 U/L    Protein, total 6.7 6.4 - 8.2 g/dL    Albumin 3.0 (L) 3.5 - 5.0 g/dL    Globulin 3.7 2.0 - 4.0 g/dL    A-G Ratio 0.8 (L) 1.1 - 2.2     LACTIC ACID    Collection Time: 08/19/21  1:40 PM   Result Value Ref Range    Lactic acid 1.9 0.4 - 2.0 mmol/L   URINALYSIS W/ RFLX MICROSCOPIC    Collection Time: 08/19/21  4:05 PM   Result Value Ref Range    Color Yellow      Appearance Turbid (A) Clear      Specific gravity 1.019 1.003 - 1.030      pH (UA) 5.0 5.0 - 8.0      Protein 100 (A) Negative mg/dL    Glucose Negative Negative mg/dL    Ketone Negative Negative mg/dL    Bilirubin Negative Negative      Blood Negative Negative      Urobilinogen 4.0 (H) 0.1 - 1.0 EU/dL    Nitrites Negative Negative      Leukocyte Esterase Negative Negative      WBC 0-4 0 - 4 /hpf    RBC 0-5 0 - 5 /hpf    Bacteria Negative Negative /hpf    Mucus Trace /lpf   URINE MICROSCOPIC    Collection Time: 08/19/21  4:05 PM   Result Value Ref Range    WBC 0-4 0 - 4 /hpf    RBC 0-5 0 - 5 /hpf    Bacteria Negative Negative /hpf    Mucus Trace (A) Negative /lpf   BLOOD GAS, ARTERIAL    Collection Time: 08/19/21  5:05 PM   Result Value Ref Range    pH 7.45 7.35 - 7.45      PCO2 40 35 - 45 mmHg    PO2 105 (H) 75 - 100 mmHg    O2 SAT 99 >95 %    BICARBONATE 28 (H) 22 - 26 mmol/L    BASE EXCESS 3.5 (H) 0 - 2 mmol/L    O2 METHOD Non-rebreathing mask      O2 FLOW RATE 15 L/min    FIO2 100.0 %    EPAP/CPAP/PEEP 0      SITE Right Brachial      ARCENIO'S TEST PASS         XR Results (most recent):  Results from Hospital Encounter encounter on 08/19/21    XR CHEST PORT    Narrative  Portable upright radiograph of the chest 1:45 p.m. no prior study. INDICATION: Covid positive, hypoxia. Heart size is at the upper limits of normal with a tortuous aorta. Bilateral  peripheral infiltrates are noted consistent with history of Covid positive  status. No effusion or pneumothorax. No acute bony findings. Impression  Bilateral peripheral infiltrates consistent with Covid pneumonia. CTA CHEST W OR W WO CONT   Final Result   Geographic groundglass opacities throughout the lungs. Consider   infectious/inflammatory process including viral etiologies. No CT evidence for PE. Cholelithiasis. XR CHEST PORT   Final Result   Bilateral peripheral infiltrates consistent with Covid pneumonia. Review of Systems:  Constitutional: Negative for chills and fever. HENT: Negative. Eyes: Negative. Respiratory: Negative. Cardiovascular: Negative. Gastrointestinal: Negative for abdominal pain and nausea. Skin: Negative. Neurological: Negative. Objective:   VITALS:    Visit Vitals  /74   Pulse 72   Temp 97.2 °F (36.2 °C)   Resp 18   Ht 5' 8\" (1.727 m)   Wt 141.1 kg (311 lb)   SpO2 99%   BMI 47.29 kg/m²       Physical Exam:   Constitutional: pt is oriented to person, place, and time. HENT:   Head: Normocephalic and atraumatic. Eyes: Pupils are equal, round, and reactive to light. EOM are normal.   Cardiovascular: Normal rate, regular rhythm and normal heart sounds. Pulmonary/Chest: Breath sounds normal. No wheezes. No rales. Exhibits no tenderness. Abdominal: Soft. Bowel sounds are normal. There is no abdominal tenderness. There is no rebound and no guarding. Musculoskeletal: Normal range of motion. Neurological: pt is alert and oriented to person, place, and time. Alert. Normal strength. No cranial nerve deficit or sensory deficit. Displays a negative Romberg sign.         ASSESSMENT & PLAN:    Acute hypoxemic respiratory failure  COVID-19 pneumonitis      Current Facility-Administered Medications:     0.9% sodium chloride infusion, 75 mL/hr, IntraVENous, CONTINUOUS, Anita De MD, Last Rate: 75 mL/hr at 08/19/21 1830, 75 mL/hr at 08/19/21 1830    acetaminophen (TYLENOL) tablet 650 mg, 650 mg, Oral, Q6H PRN **OR** acetaminophen (TYLENOL) suppository 660 mg, 660 mg, Rectal, Q6H PRN, Pamela De MD    polyethylene glycol (MIRALAX) packet 17 g, 17 g, Oral, DAILY PRN, Pamela De MD    ondansetron (ZOFRAN ODT) tablet 4 mg, 4 mg, Oral, Q8H PRN **OR** ondansetron (ZOFRAN) injection 4 mg, 4 mg, IntraVENous, Q6H PRN, Anita De MD    [START ON 8/20/2021] enoxaparin (LOVENOX) injection 40 mg, 40 mg, SubCUTAneous, DAILY, Pamela De MD    dexamethasone (DECADRON) 4 mg/mL injection 4 mg, 4 mg, IntraVENous, Q6H, Anita De MD, 4 mg at 08/19/21 1829    [START ON 8/20/2021] azithromycin (ZITHROMAX) tablet 500 mg, 500 mg, Oral, DAILY, Pamela De MD    piperacillin-tazobactam (ZOSYN) 3.375 g in 0.9% sodium chloride (MBP/ADV) 100 mL MBP, 3.375 g, IntraVENous, Q8H, Anita De MD    remdesivir 200 mg in 0.9% sodium chloride 250 mL IVPB, 200 mg, IntraVENous, Q24H, Anita De MD    [START ON 8/20/2021] remdesivir 100 mg in 0.9% sodium chloride 250 mL IVPB, 100 mg, IntraVENous, Q24H, Pamela De MD      Started the following medication  Pulmonary consult      ________________________________________________________________________    Signed: Madeline Felix MD

## 2021-08-20 NOTE — PROGRESS NOTES
PULMONARY CONSULT  [facility]    Name: Wayne Hines MRN: [id number]   : 1955 Hospital: [address] [place] [address]   Date: 2021  Admission date: 2021 Hospital Day: 2       HPI:     Hospital Problems  Never Reviewed        Codes Class Noted POA    Hypoxia ICD-10-CM: R09.02  ICD-9-CM: 799.02  2021 Unknown        COVID-19 ICD-10-CM: U07.1  ICD-9-CM: 079.89  2021 Unknown                   [x] High complexity decision making was performed  [x] See my orders for details      Subjective/Initial History:     I was asked by Grayson Montalvo MD to see Wayne Hines  a 72 y.o.  female in consultation     Excerpts from admission 2021 or consult notes as follows:   Ms. Young Mercer is a 73 yo F came to the ER d/t SOB, fever, fatigue and generalised weakness. Pt was admitted for COVID19 Pneumonitis -  CXR shows B/L peripheral infiltrates consistent w/ diagnosis. Pulmo consulted for Management. Pt seen in room  Alert awake says she has mild chest tightness but much improved from admission O2sat 93% on O2 -5L now she is 100% nonrebreather mask she did not get vaccinated she was complaining of generalized weakness dyspnea cough so admitted and pulmonary consult was called for further evaluation.     No Known Allergies     MAR reviewed and pertinent medications noted or modified as needed     Current Facility-Administered Medications   Medication    0.9% sodium chloride infusion    acetaminophen (TYLENOL) tablet 650 mg    Or    acetaminophen (TYLENOL) suppository 660 mg    polyethylene glycol (MIRALAX) packet 17 g    ondansetron (ZOFRAN ODT) tablet 4 mg    Or    ondansetron (ZOFRAN) injection 4 mg    enoxaparin (LOVENOX) injection 40 mg    dexamethasone (DECADRON) 4 mg/mL injection 4 mg    azithromycin (ZITHROMAX) tablet 500 mg    piperacillin-tazobactam (ZOSYN) 3.375 g in 0.9% sodium chloride (MBP/ADV) 100 mL MBP    remdesivir 200 mg in 0.9% sodium chloride 250 mL IVPB    remdesivir 100 mg in 0.9% sodium chloride 250 mL IVPB      Patient PCP: None  PMH:  has no past medical history on file. PSH:   has no past surgical history on file. FHX: family history is not on file. SHX:  reports that she has never smoked. She has never used smokeless tobacco. She reports previous alcohol use. She reports that she does not use drugs. ROS:    Review of Systems   Constitutional: Positive for malaise/fatigue. HENT: Negative. Eyes: Negative. Respiratory: Positive for cough, sputum production and shortness of breath. Cardiovascular: Negative. Gastrointestinal: Negative. Genitourinary: Negative. Musculoskeletal: Negative. Skin: Negative. Neurological: Negative. Psychiatric/Behavioral: Negative. Objective:     Vital Signs: Telemetry:    normal sinus rhythm Intake/Output:   Visit Vitals  BP (!) 140/73   Pulse 79   Temp 98.6 °F (37 °C)   Resp 16   Ht 5' 8\" (1.727 m)   Wt 141.1 kg (311 lb)   SpO2 93%   BMI 47.29 kg/m²       Temp (24hrs), Av °F (36.7 °C), Min:97.2 °F (36.2 °C), Max:98.6 °F (37 °C)        O2 Device: Non-rebreather mask O2 Flow Rate (L/min): 10 l/min       Wt Readings from Last 4 Encounters:   21 141.1 kg (311 lb)        No intake or output data in the 24 hours ending 21 1042    Last shift:      No intake/output data recorded. Last 3 shifts: No intake/output data recorded. Physical Exam:     Physical Exam  Constitutional:       Appearance: She is obese. She is ill-appearing. HENT:      Head: Normocephalic and atraumatic. Nose: Nose normal.      Mouth/Throat:      Mouth: Mucous membranes are moist.   Eyes:      Pupils: Pupils are equal, round, and reactive to light. Cardiovascular:      Rate and Rhythm: Normal rate and regular rhythm. Pulses: Normal pulses. Heart sounds: Normal heart sounds. Pulmonary:      Effort: Respiratory distress present. Breath sounds: Rhonchi present. Abdominal:      General: Abdomen is flat. Bowel sounds are normal.      Palpations: Abdomen is soft. Musculoskeletal:         General: Normal range of motion. Cervical back: Normal range of motion and neck supple. Skin:     General: Skin is warm. Neurological:      General: No focal deficit present. Mental Status: She is alert. Psychiatric:         Mood and Affect: Mood normal.          Labs:    Recent Labs     08/20/21  0800 08/19/21  1340   WBC 7.1 5.3   HGB 14.1 14.2    269     Recent Labs     08/20/21  0800 08/19/21  1340    140   K 4.0 3.6   * 106   CO2 26 29   * 105*   BUN 11 11   CREA 0.84 0.83   CA 8.5 8.6   LAC  --  1.9   ALB 2.9* 3.0*   ALT 28 29     Recent Labs     08/19/21  1705   PH 7.45   PCO2 40   PO2 105*   HCO3 28*   FIO2 100.0     No results for input(s): CPK, CKNDX, TROIQ in the last 72 hours. No lab exists for component: CPKMB  No results found for: BNPP, BNP   Lab Results   Component Value Date/Time    Culture result: No growth after 20 hours 08/19/2021 01:40 PM   No results found for: TSH, TSHEXT, TSHEXT    Imaging:    CXR Results  (Last 48 hours)               08/19/21 1354  XR CHEST PORT Final result    Impression:  Bilateral peripheral infiltrates consistent with Covid pneumonia. Narrative:  Portable upright radiograph of the chest 1:45 p.m. no prior study. INDICATION: Covid positive, hypoxia. Heart size is at the upper limits of normal with a tortuous aorta. Bilateral   peripheral infiltrates are noted consistent with history of Covid positive   status. No effusion or pneumothorax. No acute bony findings. Results from [place] encounter on 08/19/21    XR CHEST PORT    Narrative  Portable upright radiograph of the chest 1:45 p.m. no prior study. INDICATION: Covid positive, hypoxia. Heart size is at the upper limits of normal with a tortuous aorta.  Bilateral  peripheral infiltrates are noted consistent with history of Covid positive  status. No effusion or pneumothorax. No acute bony findings. Impression  Bilateral peripheral infiltrates consistent with Covid pneumonia. Results from [place] encounter on 08/19/21    CTA CHEST W OR W WO CONT    Narrative  CTA chest.    Axial images are reviewed along with reformatted sagittal/coronal/MIP images. 100 mL Isovue 370 administered. Dose reduction: All CT scans at this facility are performed using dose reduction  optimization techniques as appropriate to a performed exam including the  following-  automated exposure control, adjustments of mA and/or Kv according to patient  size, or use of iterative reconstructive technique. Geographic groundglass opacities present throughout the lungs. Findings in  keeping with infectious/inflammatory process including viral etiologies. No  pleural effusion. Enhanced images demonstrate normal contrast opacification, normal caliber  pulmonary arterial trunk. Central pulmonary arterial branch vessels normally  opacify; no CT evidence for PE. Pulsatile artifact ascending thoracic aorta. Otherwise, thoracic aortic arch, great vessels traversing the superior  mediastinum, and ascending thoracic aorta normally opacify. Imaged content upper abdomen reveals 2 cm gallstone gallbladder lumen. Impression  Geographic groundglass opacities throughout the lungs. Consider  infectious/inflammatory process including viral etiologies. No CT evidence for PE. Cholelithiasis. IMPRESSION:   1. COVID Pneumonia -  8/19 CXR shows B/L peripheral infiltrates consistent w/dx COVID-19 pneumonia  2. Acute hypoxic respiratory failure   3. Rule out obstructive sleep apnea syndrome  Body mass index is 47.29 kg/m². 4. Pt is requiring Drug therapy requiring intensive monitoring for toxicity  5.  Pt is stable but Prognosis guarded       RECOMMENDATIONS/PLAN:     3 80-year-old obese lady came in COVID-19 positive she was not vaccinated she was regretting about it, she is on oxygen via 100% nonrebreather mask   2. Supplemental O2 to keep sats > 93%  3. Remdesivir 200 mg in 0.9% sodium chloride 250 mL  4. Zosyn, Decadron, Zithromax   5. Aspiration precautions  6. Labs to follow electrolytes, renal function and and blood counts  7. Glucose monitoring and SSI  8. Bronchial hygiene with respiratory therapy techniques, bronchodilators  9. DVT, SUP prophylaxis- Levonox  10.  She needs sleep study as an outpatient       This care involved high complexity medical decision making: I personally:  · Reviewed the flowsheet and previous days notes  · Reviewed and summarized records or history from previous days note or discussions with staff, family  · High Risk Drug therapy requiring intensive monitoring for toxicity: eg steroids, pressors, antibiotics  · Reviewed and/or ordered Clinical lab tests  · Reviewed images and/or ordered Radiology tests  · Reviewed the patients ECG / Telemetry  · Reviewed and/or adjusted NiPPV settings        Cindee Jeans, MD

## 2021-08-21 PROCEDURE — 74011250636 HC RX REV CODE- 250/636: Performed by: FAMILY MEDICINE

## 2021-08-21 PROCEDURE — 77010033678 HC OXYGEN DAILY

## 2021-08-21 PROCEDURE — 65270000029 HC RM PRIVATE

## 2021-08-21 PROCEDURE — 74011000258 HC RX REV CODE- 258: Performed by: FAMILY MEDICINE

## 2021-08-21 PROCEDURE — 74011250637 HC RX REV CODE- 250/637: Performed by: FAMILY MEDICINE

## 2021-08-21 PROCEDURE — 94760 N-INVAS EAR/PLS OXIMETRY 1: CPT

## 2021-08-21 PROCEDURE — 74011000250 HC RX REV CODE- 250: Performed by: FAMILY MEDICINE

## 2021-08-21 PROCEDURE — 74011250637 HC RX REV CODE- 250/637: Performed by: INTERNAL MEDICINE

## 2021-08-21 RX ORDER — LANOLIN ALCOHOL/MO/W.PET/CERES
3 CREAM (GRAM) TOPICAL
Status: DISCONTINUED | OUTPATIENT
Start: 2021-08-21 | End: 2021-08-26 | Stop reason: HOSPADM

## 2021-08-21 RX ORDER — BENZONATATE 100 MG/1
200 CAPSULE ORAL
Status: DISCONTINUED | OUTPATIENT
Start: 2021-08-21 | End: 2021-08-26 | Stop reason: HOSPADM

## 2021-08-21 RX ADMIN — REMDESIVIR 100 MG: 100 INJECTION, POWDER, LYOPHILIZED, FOR SOLUTION INTRAVENOUS at 21:54

## 2021-08-21 RX ADMIN — MELATONIN TAB 3 MG 3 MG: 3 TAB at 22:03

## 2021-08-21 RX ADMIN — DEXAMETHASONE SODIUM PHOSPHATE 4 MG: 4 INJECTION, SOLUTION INTRA-ARTICULAR; INTRALESIONAL; INTRAMUSCULAR; INTRAVENOUS; SOFT TISSUE at 12:09

## 2021-08-21 RX ADMIN — DEXAMETHASONE SODIUM PHOSPHATE 4 MG: 4 INJECTION, SOLUTION INTRA-ARTICULAR; INTRALESIONAL; INTRAMUSCULAR; INTRAVENOUS; SOFT TISSUE at 17:18

## 2021-08-21 RX ADMIN — AZITHROMYCIN MONOHYDRATE 500 MG: 500 TABLET ORAL at 09:21

## 2021-08-21 RX ADMIN — DEXAMETHASONE SODIUM PHOSPHATE 4 MG: 4 INJECTION, SOLUTION INTRA-ARTICULAR; INTRALESIONAL; INTRAMUSCULAR; INTRAVENOUS; SOFT TISSUE at 05:05

## 2021-08-21 RX ADMIN — ENOXAPARIN SODIUM 40 MG: 40 INJECTION SUBCUTANEOUS at 09:21

## 2021-08-21 RX ADMIN — PIPERACILLIN SODIUM AND TAZOBACTAM SODIUM 3.38 G: 3; .375 INJECTION, POWDER, LYOPHILIZED, FOR SOLUTION INTRAVENOUS at 09:19

## 2021-08-21 RX ADMIN — SODIUM CHLORIDE 75 ML/HR: 9 INJECTION, SOLUTION INTRAVENOUS at 17:22

## 2021-08-21 RX ADMIN — PIPERACILLIN SODIUM AND TAZOBACTAM SODIUM 3.38 G: 3; .375 INJECTION, POWDER, LYOPHILIZED, FOR SOLUTION INTRAVENOUS at 16:47

## 2021-08-21 NOTE — PROGRESS NOTES
Report given to Margot Garcia RN oncoming nurse to include sbar, mar, kardex, recent orders and changes;

## 2021-08-21 NOTE — PROGRESS NOTES
PULMONARY NOTE  [facility]    Name: Maricruz Covington MRN: [id number]   : 1955 Hospital: [facility]   Date: 2021  Admission date: 2021 Hospital Day: 3       HPI:     Hospital Problems  Never Reviewed        Codes Class Noted POA    Hypoxia ICD-10-CM: R09.02  ICD-9-CM: 799.02  2021 Unknown        COVID-19 ICD-10-CM: U07.1  ICD-9-CM: 079.89  2021 Unknown                   [x] High complexity decision making was performed  [x] See my orders for details      Subjective/Initial History:     I was asked by Izabella Sylvester MD to see Maricruz Covington  a 72 y.o.  female in consultation     Excerpts from admission 2021 or consult notes as follows:   Ms. Maricruz Ashford is a 73 yo F came to the ER d/t SOB, fever, fatigue and generalised weakness. Pt was admitted for COVID19 Pneumonitis -  CXR shows B/L peripheral infiltrates consistent w/ diagnosis. Pulmo consulted for Management.       Pt seen in room up alert awake, sitting n the side of the bed, pt says she has improved SOB from admission O2sat 91% on ventimask 15ml/min Denies complaining generalized weakness dyspnea or cough, able to ambulate to bedside comode with out respiratory difficulty     No Known Allergies     MAR reviewed and pertinent medications noted or modified as needed     Current Facility-Administered Medications   Medication    0.9% sodium chloride infusion    acetaminophen (TYLENOL) tablet 650 mg    Or    acetaminophen (TYLENOL) suppository 660 mg    polyethylene glycol (MIRALAX) packet 17 g    ondansetron (ZOFRAN ODT) tablet 4 mg    Or    ondansetron (ZOFRAN) injection 4 mg    enoxaparin (LOVENOX) injection 40 mg    dexamethasone (DECADRON) 4 mg/mL injection 4 mg    azithromycin (ZITHROMAX) tablet 500 mg    piperacillin-tazobactam (ZOSYN) 3.375 g in 0.9% sodium chloride (MBP/ADV) 100 mL MBP    remdesivir 100 mg in 0.9% sodium chloride 250 mL IVPB      Patient PCP: None  PMH:  has no past medical history on file. PSH:   has no past surgical history on file. FHX: family history is not on file. SHX:  reports that she has never smoked. She has never used smokeless tobacco. She reports previous alcohol use. She reports that she does not use drugs. ROS:    Review of Systems   Constitutional: Positive for malaise/fatigue. HENT: Negative. Eyes: Negative. Respiratory: Positive for cough, sputum production and shortness of breath. Cardiovascular: Negative. Gastrointestinal: Negative. Genitourinary: Negative. Musculoskeletal: Negative. Skin: Negative. Neurological: Negative. Psychiatric/Behavioral: Negative. Objective:     Vital Signs: Telemetry:    normal sinus rhythm Intake/Output:   Visit Vitals  BP (!) 156/91 (BP 1 Location: Left upper arm, BP Patient Position: At rest)   Pulse 68   Temp 97.9 °F (36.6 °C)   Resp 18   Ht 5' 8\" (1.727 m)   Wt 141.1 kg (311 lb)   SpO2 91%   BMI 47.29 kg/m²       Temp (24hrs), Av.9 °F (36.6 °C), Min:97.5 °F (36.4 °C), Max:98.3 °F (36.8 °C)        O2 Device: Ventimask O2 Flow Rate (L/min): 15 l/min       Wt Readings from Last 4 Encounters:   21 141.1 kg (311 lb)          Intake/Output Summary (Last 24 hours) at 2021 1042  Last data filed at 2021 1600  Gross per 24 hour   Intake 600 ml   Output 600 ml   Net 0 ml       Last shift:      No intake/output data recorded. Last 3 shifts: 1901 -  0700  In: 1300 [P.O.:1300]  Out: 1000 [Urine:1000]       Physical Exam:     Physical Exam  Constitutional:       Appearance: She is obese. She is ill-appearing. HENT:      Head: Normocephalic and atraumatic. Nose: Nose normal.      Mouth/Throat:      Mouth: Mucous membranes are moist.   Eyes:      Pupils: Pupils are equal, round, and reactive to light. Cardiovascular:      Rate and Rhythm: Normal rate and regular rhythm. Pulses: Normal pulses.       Heart sounds: Normal heart sounds. Pulmonary:      Effort: Respiratory distress present. Breath sounds: Rhonchi present. Abdominal:      General: Abdomen is flat. Bowel sounds are normal.      Palpations: Abdomen is soft. Musculoskeletal:         General: Normal range of motion. Cervical back: Normal range of motion and neck supple. Skin:     General: Skin is warm. Neurological:      General: No focal deficit present. Mental Status: She is alert. Psychiatric:         Mood and Affect: Mood normal.          Labs:    Recent Labs     08/20/21  0800 08/19/21  1340   WBC 7.1 5.3   HGB 14.1 14.2    269     Recent Labs     08/20/21  0800 08/19/21  1340    140   K 4.0 3.6   * 106   CO2 26 29   * 105*   BUN 11 11   CREA 0.84 0.83   CA 8.5 8.6   LAC  --  1.9   ALB 2.9* 3.0*   ALT 28 29     Recent Labs     08/19/21  1705   PH 7.45   PCO2 40   PO2 105*   HCO3 28*   FIO2 100.0     No results for input(s): CPK, CKNDX, TROIQ in the last 72 hours. No lab exists for component: CPKMB  No results found for: BNPP, BNP   Lab Results   Component Value Date/Time    Culture result: No growth after 5 hours 08/19/2021 09:28 PM    Culture result: No growth after 5 hours 08/19/2021 09:25 PM    Culture result: No growth 1 day 08/19/2021 01:40 PM   No results found for: TSH, TSHEXT, TSHEXT    Imaging:    CXR Results  (Last 48 hours)               08/19/21 1354  XR CHEST PORT Final result    Impression:  Bilateral peripheral infiltrates consistent with Covid pneumonia. Narrative:  Portable upright radiograph of the chest 1:45 p.m. no prior study. INDICATION: Covid positive, hypoxia. Heart size is at the upper limits of normal with a tortuous aorta. Bilateral   peripheral infiltrates are noted consistent with history of Covid positive   status. No effusion or pneumothorax. No acute bony findings.                Results from [place] encounter on 08/19/21    XR CHEST PORT    Narrative  Portable upright radiograph of the chest 1:45 p.m. no prior study. INDICATION: Covid positive, hypoxia. Heart size is at the upper limits of normal with a tortuous aorta. Bilateral  peripheral infiltrates are noted consistent with history of Covid positive  status. No effusion or pneumothorax. No acute bony findings. Impression  Bilateral peripheral infiltrates consistent with Covid pneumonia. Results from [place] encounter on 08/19/21    CTA CHEST W OR W WO CONT    Narrative  CTA chest.    Axial images are reviewed along with reformatted sagittal/coronal/MIP images. 100 mL Isovue 370 administered. Dose reduction: All CT scans at this facility are performed using dose reduction  optimization techniques as appropriate to a performed exam including the  following-  automated exposure control, adjustments of mA and/or Kv according to patient  size, or use of iterative reconstructive technique. Geographic groundglass opacities present throughout the lungs. Findings in  keeping with infectious/inflammatory process including viral etiologies. No  pleural effusion. Enhanced images demonstrate normal contrast opacification, normal caliber  pulmonary arterial trunk. Central pulmonary arterial branch vessels normally  opacify; no CT evidence for PE. Pulsatile artifact ascending thoracic aorta. Otherwise, thoracic aortic arch, great vessels traversing the superior  mediastinum, and ascending thoracic aorta normally opacify. Imaged content upper abdomen reveals 2 cm gallstone gallbladder lumen. Impression  Geographic groundglass opacities throughout the lungs. Consider  infectious/inflammatory process including viral etiologies. No CT evidence for PE. Cholelithiasis. IMPRESSION:   1. COVID Pneumonia -  8/19 CXR shows B/L peripheral infiltrates consistent w/dx COVID-19 pneumonia  2. Acute hypoxic respiratory failure   3.  Rule out obstructive sleep apnea syndrome  Body mass index is 47.29 kg/m².  4. Pt is requiring Drug therapy requiring intensive monitoring for toxicity  5. Pt is stable but Prognosis guarded       RECOMMENDATIONS/PLAN:     1. COVID-19 positive- not vaccinated on oxygen via Ventimask  O2sat 91%  2. Supplemental O2 to keep sats > 93%  3. Remdesivir 200 mg in 0.9% sodium chloride 250 mL  4. Zosyn, Decadron, Zithromax   5. Aspiration precautions  6. Labs to follow electrolytes, renal function and and blood counts  7. Glucose monitoring and SSI  8. Bronchial hygiene with respiratory therapy techniques, bronchodilators  9. DVT, SUP prophylaxis- Levonox  10.  She needs sleep study as an outpatient       ·         Akilah Rao MD

## 2021-08-21 NOTE — PROGRESS NOTES
Progress Note    Patient: Sergio Mills MRN: [id number]  SSN: [id number]    YOB: 1955  Age: 72 y.o. Sex: female      Admit Date: 8/19/2021    LOS: 2 days     Subjective:   Patient is admitted  COVID-19 positive test (U07.1, COVID-19) with Acute Pneumonia (J12.89, Other viral pneumonia)  (If respiratory failure or sepsis present, add as separate assessment)  On 15 L Ventimask with FiO2 of 50%      Objective:     Vitals:    08/20/21 2000 08/21/21 0826 08/21/21 1719 08/21/21 1740   BP: 120/65 (!) 156/91  (!) 160/91   Pulse: 60 68  70   Resp: 18 18  18   Temp: 98.3 °F (36.8 °C) 97.9 °F (36.6 °C)  97.4 °F (36.3 °C)   SpO2: 97% 91% 94% 94%   Weight:       Height:            Intake and Output:  Current Shift: No intake/output data recorded. Last three shifts: 08/19 1901 - 08/21 0700  In: 1300 [P.O.:1300]  Out: 1000 [Urine:1000]    Physical Exam:   General:  Alert, cooperative, no distress, appears stated age. Eyes:  Conjunctivae/corneas clear. PERRL, EOMs intact. Fundi benign   Ears:  Normal TMs and external ear canals both ears. Nose: Nares normal. Septum midline. Mucosa normal. No drainage or sinus tenderness. Mouth/Throat: Lips, mucosa, and tongue normal. Teeth and gums normal.   Neck: Supple, symmetrical, trachea midline, no adenopathy, thyroid: no enlargment/tenderness/nodules, no carotid bruit and no JVD. Back:   Symmetric, no curvature. ROM normal. No CVA tenderness. Lungs:   Diminished  to auscultation bilaterally. Heart:  Regular rate and rhythm, S1, S2 normal, no murmur, click, rub or gallop. Abdomen:   Soft, non-tender. Bowel sounds normal. No masses,  No organomegaly. Extremities: Extremities normal, atraumatic, no cyanosis or edema. Pulses: 2+ and symmetric all extremities. Skin: Skin color, texture, turgor normal. No rashes or lesions   Lymph nodes: Cervical, supraclavicular, and axillary nodes normal.   Neurologic: CNII-XII intact.  Normal strength, sensation and reflexes throughout. Lab/Data Review: All lab results for the last 24 hours reviewed. No results found for this or any previous visit (from the past 24 hour(s)).       Assessment:     Active Problems:    Hypoxia (8/19/2021)      COVID-19 (8/19/2021)    Acute hypoxic respiratory failure    Plan:     Continue present treatment    Signed By: Asha Bennett MD     August 21, 2021

## 2021-08-21 NOTE — PROGRESS NOTES
PULMONARY CONSULT  [facility]    Name: Lovely Killian MRN: [id number]   : 1955 Hospital: [facility]   Date: 2021  Admission date: 2021 Hospital Day: 3       HPI:     Hospital Problems  Never Reviewed        Codes Class Noted POA    Hypoxia ICD-10-CM: R09.02  ICD-9-CM: 799.02  2021 Unknown        COVID-19 ICD-10-CM: U07.1  ICD-9-CM: 079.89  2021 Unknown                   [x] High complexity decision making was performed  [x] See my orders for details      Subjective/Initial History:     I was asked by Madeline Felix MD to see Lovely Killian  a 72 y.o.  female in consultation     Excerpts from admission 2021 or consult notes as follows:   Ms. Evita Burger is a 71 yo F came to the ER d/t SOB, fever, fatigue and generalised weakness. Pt was admitted for COVID19 Pneumonitis -  CXR shows B/L peripheral infiltrates consistent w/ diagnosis. Pulmo consulted for Management.       Pt seen in room up alert awake, sitting n the side of the bed, pt says she has improved SOB from admission O2sat 91% on ventimask 15ml/min Denies complaining generalized weakness dyspnea or cough, able to ambulate to bedside comode with out respiratory difficulty     No Known Allergies     MAR reviewed and pertinent medications noted or modified as needed     Current Facility-Administered Medications   Medication    0.9% sodium chloride infusion    acetaminophen (TYLENOL) tablet 650 mg    Or    acetaminophen (TYLENOL) suppository 660 mg    polyethylene glycol (MIRALAX) packet 17 g    ondansetron (ZOFRAN ODT) tablet 4 mg    Or    ondansetron (ZOFRAN) injection 4 mg    enoxaparin (LOVENOX) injection 40 mg    dexamethasone (DECADRON) 4 mg/mL injection 4 mg    azithromycin (ZITHROMAX) tablet 500 mg    piperacillin-tazobactam (ZOSYN) 3.375 g in 0.9% sodium chloride (MBP/ADV) 100 mL MBP    remdesivir 100 mg in 0.9% sodium chloride 250 mL IVPB      Patient PCP: None  PMH:  has no past medical history on file. PSH:   has no past surgical history on file. FHX: family history is not on file. SHX:  reports that she has never smoked. She has never used smokeless tobacco. She reports previous alcohol use. She reports that she does not use drugs. ROS:    Review of Systems   Constitutional: Positive for malaise/fatigue. HENT: Negative. Eyes: Negative. Respiratory: Positive for cough, sputum production and shortness of breath. Cardiovascular: Negative. Gastrointestinal: Negative. Genitourinary: Negative. Musculoskeletal: Negative. Skin: Negative. Neurological: Negative. Psychiatric/Behavioral: Negative. Objective:     Vital Signs: Telemetry:    normal sinus rhythm Intake/Output:   Visit Vitals  BP (!) 156/91 (BP 1 Location: Left upper arm, BP Patient Position: At rest)   Pulse 68   Temp 97.9 °F (36.6 °C)   Resp 18   Ht 5' 8\" (1.727 m)   Wt 311 lb (141.1 kg)   SpO2 91%   BMI 47.29 kg/m²       Temp (24hrs), Av.9 °F (36.6 °C), Min:97.5 °F (36.4 °C), Max:98.3 °F (36.8 °C)        O2 Device: Ventimask O2 Flow Rate (L/min): 15 l/min       Wt Readings from Last 4 Encounters:   21 311 lb (141.1 kg)          Intake/Output Summary (Last 24 hours) at 2021 0944  Last data filed at 2021 1600  Gross per 24 hour   Intake 600 ml   Output 600 ml   Net 0 ml       Last shift:      No intake/output data recorded. Last 3 shifts: 1901 -  0700  In: 1300 [P.O.:1300]  Out: 1000 [Urine:1000]       Physical Exam:     Physical Exam  Constitutional:       Appearance: She is obese. She is ill-appearing. HENT:      Head: Normocephalic and atraumatic. Nose: Nose normal.      Mouth/Throat:      Mouth: Mucous membranes are moist.   Eyes:      Pupils: Pupils are equal, round, and reactive to light. Cardiovascular:      Rate and Rhythm: Normal rate and regular rhythm. Pulses: Normal pulses.       Heart sounds: Normal heart sounds. Pulmonary:      Effort: Respiratory distress present. Breath sounds: Rhonchi present. Abdominal:      General: Abdomen is flat. Bowel sounds are normal.      Palpations: Abdomen is soft. Musculoskeletal:         General: Normal range of motion. Cervical back: Normal range of motion and neck supple. Skin:     General: Skin is warm. Neurological:      General: No focal deficit present. Mental Status: She is alert. Psychiatric:         Mood and Affect: Mood normal.          Labs:    Recent Labs     08/20/21  0800 08/19/21  1340   WBC 7.1 5.3   HGB 14.1 14.2    269     Recent Labs     08/20/21  0800 08/19/21  1340    140   K 4.0 3.6   * 106   CO2 26 29   * 105*   BUN 11 11   CREA 0.84 0.83   CA 8.5 8.6   LAC  --  1.9   ALB 2.9* 3.0*   ALT 28 29     Recent Labs     08/19/21  1705   PH 7.45   PCO2 40   PO2 105*   HCO3 28*   FIO2 100.0     No results for input(s): CPK, CKNDX, TROIQ in the last 72 hours. No lab exists for component: CPKMB  No results found for: BNPP, BNP   Lab Results   Component Value Date/Time    Culture result: No growth after 5 hours 08/19/2021 09:28 PM    Culture result: No growth after 5 hours 08/19/2021 09:25 PM    Culture result: No growth 1 day 08/19/2021 01:40 PM   No results found for: TSH, TSHEXT, TSHEXT    Imaging:    CXR Results  (Last 48 hours)               08/19/21 1354  XR CHEST PORT Final result    Impression:  Bilateral peripheral infiltrates consistent with Covid pneumonia. Narrative:  Portable upright radiograph of the chest 1:45 p.m. no prior study. INDICATION: Covid positive, hypoxia. Heart size is at the upper limits of normal with a tortuous aorta. Bilateral   peripheral infiltrates are noted consistent with history of Covid positive   status. No effusion or pneumothorax. No acute bony findings.                Results from [place] encounter on 08/19/21    XR CHEST PORT    Narrative  Portable upright radiograph of the chest 1:45 p.m. no prior study. INDICATION: Covid positive, hypoxia. Heart size is at the upper limits of normal with a tortuous aorta. Bilateral  peripheral infiltrates are noted consistent with history of Covid positive  status. No effusion or pneumothorax. No acute bony findings. Impression  Bilateral peripheral infiltrates consistent with Covid pneumonia. Results from [place] encounter on 08/19/21    CTA CHEST W OR W WO CONT    Narrative  CTA chest.    Axial images are reviewed along with reformatted sagittal/coronal/MIP images. 100 mL Isovue 370 administered. Dose reduction: All CT scans at this facility are performed using dose reduction  optimization techniques as appropriate to a performed exam including the  following-  automated exposure control, adjustments of mA and/or Kv according to patient  size, or use of iterative reconstructive technique. Geographic groundglass opacities present throughout the lungs. Findings in  keeping with infectious/inflammatory process including viral etiologies. No  pleural effusion. Enhanced images demonstrate normal contrast opacification, normal caliber  pulmonary arterial trunk. Central pulmonary arterial branch vessels normally  opacify; no CT evidence for PE. Pulsatile artifact ascending thoracic aorta. Otherwise, thoracic aortic arch, great vessels traversing the superior  mediastinum, and ascending thoracic aorta normally opacify. Imaged content upper abdomen reveals 2 cm gallstone gallbladder lumen. Impression  Geographic groundglass opacities throughout the lungs. Consider  infectious/inflammatory process including viral etiologies. No CT evidence for PE. Cholelithiasis. IMPRESSION:   1. COVID Pneumonia -  8/19 CXR shows B/L peripheral infiltrates consistent w/dx COVID-19 pneumonia  2. Acute hypoxic respiratory failure   3.  Rule out obstructive sleep apnea syndrome  Body mass index is 47.29 kg/m².  4. Pt is requiring Drug therapy requiring intensive monitoring for toxicity  5. Pt is stable but Prognosis guarded       RECOMMENDATIONS/PLAN:     1. COVID-19 positive- not vaccinated on oxygen via Ventimask 15ml/min O2sat 91%  2. Supplemental O2 to keep sats > 93%  3. Remdesivir 200 mg in 0.9% sodium chloride 250 mL  4. Zosyn, Decadron, Zithromax   5. Aspiration precautions  6. Labs to follow electrolytes, renal function and and blood counts  7. Glucose monitoring and SSI  8. Bronchial hygiene with respiratory therapy techniques, bronchodilators  9. DVT, SUP prophylaxis- Levonox  10.  She needs sleep study as an outpatient       This care involved high complexity medical decision making: I personally:  · Reviewed the flowsheet and previous days notes  · Reviewed and summarized records or history from previous days note or discussions with staff, family  · High Risk Drug therapy requiring intensive monitoring for toxicity: eg steroids, pressors, antibiotics  · Reviewed and/or ordered Clinical lab tests  · Reviewed images and/or ordered Radiology tests  · Reviewed the patients ECG / Telemetry  · Reviewed and/or adjusted NiPPV settings        Ramsey Guzmán

## 2021-08-22 ENCOUNTER — APPOINTMENT (OUTPATIENT)
Dept: GENERAL RADIOLOGY | Age: 66
DRG: 177 | End: 2021-08-22
Attending: INTERNAL MEDICINE
Payer: MEDICARE

## 2021-08-22 PROCEDURE — 94760 N-INVAS EAR/PLS OXIMETRY 1: CPT

## 2021-08-22 PROCEDURE — 74011250637 HC RX REV CODE- 250/637: Performed by: FAMILY MEDICINE

## 2021-08-22 PROCEDURE — 74011250636 HC RX REV CODE- 250/636: Performed by: FAMILY MEDICINE

## 2021-08-22 PROCEDURE — 74011000250 HC RX REV CODE- 250: Performed by: FAMILY MEDICINE

## 2021-08-22 PROCEDURE — 74011250637 HC RX REV CODE- 250/637: Performed by: INTERNAL MEDICINE

## 2021-08-22 PROCEDURE — 77010033678 HC OXYGEN DAILY

## 2021-08-22 PROCEDURE — 74011000258 HC RX REV CODE- 258: Performed by: FAMILY MEDICINE

## 2021-08-22 PROCEDURE — 71045 X-RAY EXAM CHEST 1 VIEW: CPT

## 2021-08-22 PROCEDURE — 65270000029 HC RM PRIVATE

## 2021-08-22 RX ORDER — AMLODIPINE BESYLATE 5 MG/1
5 TABLET ORAL DAILY
Status: DISCONTINUED | OUTPATIENT
Start: 2021-08-22 | End: 2021-08-23

## 2021-08-22 RX ORDER — AMLODIPINE BESYLATE 5 MG/1
5 TABLET ORAL DAILY
Status: DISCONTINUED | OUTPATIENT
Start: 2021-08-23 | End: 2021-08-22

## 2021-08-22 RX ADMIN — MELATONIN TAB 3 MG 3 MG: 3 TAB at 21:31

## 2021-08-22 RX ADMIN — PIPERACILLIN SODIUM AND TAZOBACTAM SODIUM 3.38 G: 3; .375 INJECTION, POWDER, LYOPHILIZED, FOR SOLUTION INTRAVENOUS at 06:52

## 2021-08-22 RX ADMIN — SODIUM CHLORIDE 75 ML/HR: 9 INJECTION, SOLUTION INTRAVENOUS at 06:53

## 2021-08-22 RX ADMIN — PIPERACILLIN SODIUM AND TAZOBACTAM SODIUM 3.38 G: 3; .375 INJECTION, POWDER, LYOPHILIZED, FOR SOLUTION INTRAVENOUS at 15:38

## 2021-08-22 RX ADMIN — AZITHROMYCIN MONOHYDRATE 500 MG: 500 TABLET ORAL at 08:02

## 2021-08-22 RX ADMIN — DEXAMETHASONE SODIUM PHOSPHATE 4 MG: 4 INJECTION, SOLUTION INTRA-ARTICULAR; INTRALESIONAL; INTRAMUSCULAR; INTRAVENOUS; SOFT TISSUE at 00:18

## 2021-08-22 RX ADMIN — DEXAMETHASONE SODIUM PHOSPHATE 4 MG: 4 INJECTION, SOLUTION INTRA-ARTICULAR; INTRALESIONAL; INTRAMUSCULAR; INTRAVENOUS; SOFT TISSUE at 12:23

## 2021-08-22 RX ADMIN — PIPERACILLIN SODIUM AND TAZOBACTAM SODIUM 3.38 G: 3; .375 INJECTION, POWDER, LYOPHILIZED, FOR SOLUTION INTRAVENOUS at 00:19

## 2021-08-22 RX ADMIN — REMDESIVIR 100 MG: 100 INJECTION, POWDER, LYOPHILIZED, FOR SOLUTION INTRAVENOUS at 21:40

## 2021-08-22 RX ADMIN — DEXAMETHASONE SODIUM PHOSPHATE 4 MG: 4 INJECTION, SOLUTION INTRA-ARTICULAR; INTRALESIONAL; INTRAMUSCULAR; INTRAVENOUS; SOFT TISSUE at 06:52

## 2021-08-22 RX ADMIN — DEXAMETHASONE SODIUM PHOSPHATE 4 MG: 4 INJECTION, SOLUTION INTRA-ARTICULAR; INTRALESIONAL; INTRAMUSCULAR; INTRAVENOUS; SOFT TISSUE at 17:48

## 2021-08-22 RX ADMIN — ENOXAPARIN SODIUM 40 MG: 40 INJECTION SUBCUTANEOUS at 08:02

## 2021-08-22 NOTE — PROGRESS NOTES
Bedside and Verbal shift change report given to Evie Arzate RN(oncoming nurse) by Karina Rascon RN (offgoing nurse). Report included the following information SBAR, MAR and Recent Results.

## 2021-08-22 NOTE — PROGRESS NOTES
PULMONARY NOTE  [facility]    Name: Lauren Galeana MRN: [id number]   : 1955 Hospital: [address] [place] [address]   Date: 2021  Admission date: 2021 Hospital Day: 4       HPI:     Hospital Problems  Never Reviewed        Codes Class Noted POA    Hypoxia ICD-10-CM: R09.02  ICD-9-CM: 799.02  2021 Unknown        COVID-19 ICD-10-CM: U07.1  ICD-9-CM: 079.89  2021 Unknown                   [x] High complexity decision making was performed  [x] See my orders for details      Subjective/Initial History:     I was asked by Pau Santamaria MD to see Lauren Galeana  a 72 y.o.  female in consultation     Excerpts from admission 2021 or consult notes as follows:   Ms. Vivek Arenas is a 73 yo F came to the ER d/t SOB, fever, fatigue and generalised weakness. Pt was admitted for COVID19 Pneumonitis -  CXR shows B/L peripheral infiltrates consistent w/ diagnosis. Pulmo consulted for Management.       Pt seen in room up alert awake, sitting n the side of the bed, pt says she has improved SOB from admission O2sat 91% on ventimask 15ml/min Denies complaining generalized weakness dyspnea or cough, able to ambulate to bedside comode with out respiratory difficulty     No Known Allergies     MAR reviewed and pertinent medications noted or modified as needed     Current Facility-Administered Medications   Medication    melatonin tablet 3 mg    benzonatate (TESSALON) capsule 200 mg    0.9% sodium chloride infusion    acetaminophen (TYLENOL) tablet 650 mg    Or    acetaminophen (TYLENOL) suppository 660 mg    polyethylene glycol (MIRALAX) packet 17 g    ondansetron (ZOFRAN ODT) tablet 4 mg    Or    ondansetron (ZOFRAN) injection 4 mg    enoxaparin (LOVENOX) injection 40 mg    dexamethasone (DECADRON) 4 mg/mL injection 4 mg    azithromycin (ZITHROMAX) tablet 500 mg    piperacillin-tazobactam (ZOSYN) 3.375 g in 0.9% sodium chloride (MBP/ADV) 100 mL MBP    remdesivir 100 mg in 0.9% sodium chloride 250 mL IVPB      Patient PCP: None  PMH:  has no past medical history on file. PSH:   has no past surgical history on file. FHX: family history is not on file. SHX:  reports that she has never smoked. She has never used smokeless tobacco. She reports previous alcohol use. She reports that she does not use drugs. ROS:    Review of Systems   Constitutional: Positive for malaise/fatigue. HENT: Negative. Eyes: Negative. Respiratory: Positive for cough, sputum production and shortness of breath. Cardiovascular: Negative. Gastrointestinal: Negative. Genitourinary: Negative. Musculoskeletal: Negative. Skin: Negative. Neurological: Negative. Psychiatric/Behavioral: Negative. Objective:     Vital Signs: Telemetry:    normal sinus rhythm Intake/Output:   Visit Vitals  BP (!) 148/64 (BP 1 Location: Left lower arm)   Pulse (!) 56   Temp 97.5 °F (36.4 °C)   Resp 18   Ht 5' 8\" (1.727 m)   Wt 141.1 kg (311 lb)   SpO2 98%   BMI 47.29 kg/m²       Temp (24hrs), Av.5 °F (36.4 °C), Min:97.4 °F (36.3 °C), Max:97.5 °F (36.4 °C)        O2 Device: Ventimask O2 Flow Rate (L/min): 15 l/min       Wt Readings from Last 4 Encounters:   21 141.1 kg (311 lb)        No intake or output data in the 24 hours ending 21 0951    Last shift:      No intake/output data recorded. Last 3 shifts: No intake/output data recorded. Physical Exam:     Physical Exam  Constitutional:       Appearance: She is obese. She is ill-appearing. HENT:      Head: Normocephalic and atraumatic. Nose: Nose normal.      Mouth/Throat:      Mouth: Mucous membranes are moist.   Eyes:      Pupils: Pupils are equal, round, and reactive to light. Cardiovascular:      Rate and Rhythm: Normal rate and regular rhythm. Pulses: Normal pulses. Heart sounds: Normal heart sounds. Pulmonary:      Effort: Respiratory distress present.       Breath sounds: Rhonchi present. Abdominal:      General: Abdomen is flat. Bowel sounds are normal.      Palpations: Abdomen is soft. Musculoskeletal:         General: Normal range of motion. Cervical back: Normal range of motion and neck supple. Skin:     General: Skin is warm. Neurological:      General: No focal deficit present. Mental Status: She is alert. Psychiatric:         Mood and Affect: Mood normal.          Labs:    Recent Labs     08/20/21  0800 08/19/21  1340   WBC 7.1 5.3   HGB 14.1 14.2    269     Recent Labs     08/20/21  0800 08/19/21  1340    140   K 4.0 3.6   * 106   CO2 26 29   * 105*   BUN 11 11   CREA 0.84 0.83   CA 8.5 8.6   LAC  --  1.9   ALB 2.9* 3.0*   ALT 28 29     Recent Labs     08/19/21  1705   PH 7.45   PCO2 40   PO2 105*   HCO3 28*   FIO2 100.0     No results for input(s): CPK, CKNDX, TROIQ in the last 72 hours. No lab exists for component: CPKMB  No results found for: BNPP, BNP   Lab Results   Component Value Date/Time    Culture result: No growth 2 days 08/19/2021 09:28 PM    Culture result: No growth 2 days 08/19/2021 09:25 PM    Culture result: No growth 3 days 08/19/2021 01:40 PM   No results found for: TSH, TSHEXT, TSHEXT    Imaging:    CXR Results  (Last 48 hours)    None        Results from Hospital Encounter encounter on 08/19/21    XR CHEST PORT    Narrative  Portable upright radiograph of the chest 1:45 p.m. no prior study. INDICATION: Covid positive, hypoxia. Heart size is at the upper limits of normal with a tortuous aorta. Bilateral  peripheral infiltrates are noted consistent with history of Covid positive  status. No effusion or pneumothorax. No acute bony findings. Impression  Bilateral peripheral infiltrates consistent with Covid pneumonia. Results from [place] encounter on 08/19/21    CTA CHEST W OR W WO CONT    Narrative  CTA chest.    Axial images are reviewed along with reformatted sagittal/coronal/MIP images.   100 mL Isovue 370 administered. Dose reduction: All CT scans at this facility are performed using dose reduction  optimization techniques as appropriate to a performed exam including the  following-  automated exposure control, adjustments of mA and/or Kv according to patient  size, or use of iterative reconstructive technique. Geographic groundglass opacities present throughout the lungs. Findings in  keeping with infectious/inflammatory process including viral etiologies. No  pleural effusion. Enhanced images demonstrate normal contrast opacification, normal caliber  pulmonary arterial trunk. Central pulmonary arterial branch vessels normally  opacify; no CT evidence for PE. Pulsatile artifact ascending thoracic aorta. Otherwise, thoracic aortic arch, great vessels traversing the superior  mediastinum, and ascending thoracic aorta normally opacify. Imaged content upper abdomen reveals 2 cm gallstone gallbladder lumen. Impression  Geographic groundglass opacities throughout the lungs. Consider  infectious/inflammatory process including viral etiologies. No CT evidence for PE. Cholelithiasis. IMPRESSION:   1. COVID Pneumonia -  8/19 CXR shows B/L peripheral infiltrates consistent w/dx COVID-19 pneumonia  2. Acute hypoxic respiratory failure   3. Rule out obstructive sleep apnea syndrome  Body mass index is 47.29 kg/m². 4. Pt is requiring Drug therapy requiring intensive monitoring for toxicity  5. Pt is stable but Prognosis guarded       RECOMMENDATIONS/PLAN:     1. COVID-19 positive- not vaccinated on oxygen via Ventimask  O2sat 91% transition to nasal cannula in a.m.  2. Supplemental O2 to keep sats > 93%  3. Remdesivir 200 mg in 0.9% sodium chloride 250 mL  4. Zosyn, Decadron, Zithromax   5. She needs sleep study as an outpatient  6.  We will get chest x-ray today       ·         Maximo Amaya MD

## 2021-08-22 NOTE — PROGRESS NOTES
Progress Note    Patient: Lissy Campbell MRN: [id number]  SSN: [id number]    YOB: 1955  Age: 72 y.o. Sex: female      Admit Date: 8/19/2021    LOS: 3 days     Subjective:   Patient is admitted with  COVID-19 positive test (U07.1, COVID-19) with Acute Pneumonia (J12.89, Other viral pneumonia)  (If respiratory failure or sepsis present, add as separate assessment)  5l/min oxygen       Objective:     Vitals:    08/22/21 0840 08/22/21 1105 08/22/21 1555 08/22/21 1651   BP: (!) 153/84   (!) 165/84   Pulse: 76   (!) 51   Resp: 20   18   Temp: 97.5 °F (36.4 °C)   97.8 °F (36.6 °C)   SpO2: 95% 92% 98% 98%   Weight:       Height:            Intake and Output:  Current Shift: No intake/output data recorded. Last three shifts: No intake/output data recorded. Physical Exam:   General:  Alert, cooperative, no distress, appears stated age. Eyes:  Conjunctivae/corneas clear. PERRL, EOMs intact. Fundi benign   Ears:  Normal TMs and external ear canals both ears. Nose: Nares normal. Septum midline. Mucosa normal. No drainage or sinus tenderness. Mouth/Throat: Lips, mucosa, and tongue normal. Teeth and gums normal.   Neck: Supple, symmetrical, trachea midline, no adenopathy, thyroid: no enlargment/tenderness/nodules, no carotid bruit and no JVD. Back:   Symmetric, no curvature. ROM normal. No CVA tenderness. Lungs:   Diminished  to auscultation bilaterally. Heart:  Regular rate and rhythm, S1, S2 normal, no murmur, click, rub or gallop. Abdomen:   Soft, non-tender. Bowel sounds normal. No masses,  No organomegaly. Extremities: Extremities normal, atraumatic, no cyanosis or edema. Pulses: 2+ and symmetric all extremities. Skin: Skin color, texture, turgor normal. No rashes or lesions   Lymph nodes: Cervical, supraclavicular, and axillary nodes normal.   Neurologic: CNII-XII intact. Normal strength, sensation and reflexes throughout. Lab/Data Review:   All lab results for the last 24 hours reviewed. No results found for this or any previous visit (from the past 24 hour(s)).       Assessment:     Active Problems:    Hypoxia (8/19/2021)      COVID-19 (8/19/2021)    Acute hypoxic respiratory failure    Plan:     Continue present treatment    Signed By: Zackary Wisdom MD     August 22, 2021

## 2021-08-23 LAB
ALBUMIN SERPL-MCNC: 3 G/DL (ref 3.5–5)
ALBUMIN/GLOB SERPL: 0.9 {RATIO} (ref 1.1–2.2)
ALP SERPL-CCNC: 44 U/L (ref 45–117)
ALT SERPL-CCNC: 36 U/L (ref 12–78)
ANION GAP SERPL CALC-SCNC: 3 MMOL/L (ref 5–15)
AST SERPL W P-5'-P-CCNC: 16 U/L (ref 15–37)
BASOPHILS # BLD: 0 K/UL (ref 0–0.1)
BASOPHILS NFR BLD: 0 % (ref 0–1)
BILIRUB SERPL-MCNC: 0.6 MG/DL (ref 0.2–1)
BUN SERPL-MCNC: 16 MG/DL (ref 6–20)
BUN/CREAT SERPL: 21 (ref 12–20)
CA-I BLD-MCNC: 8.4 MG/DL (ref 8.5–10.1)
CHLORIDE SERPL-SCNC: 107 MMOL/L (ref 97–108)
CO2 SERPL-SCNC: 30 MMOL/L (ref 21–32)
CREAT SERPL-MCNC: 0.75 MG/DL (ref 0.55–1.02)
DIFFERENTIAL METHOD BLD: ABNORMAL
EOSINOPHIL # BLD: 0 K/UL (ref 0–0.4)
EOSINOPHIL NFR BLD: 0 % (ref 0–7)
ERYTHROCYTE [DISTWIDTH] IN BLOOD BY AUTOMATED COUNT: 12.5 % (ref 11.5–14.5)
GLOBULIN SER CALC-MCNC: 3.3 G/DL (ref 2–4)
GLUCOSE SERPL-MCNC: 152 MG/DL (ref 65–100)
HCT VFR BLD AUTO: 41.9 % (ref 35–47)
HGB BLD-MCNC: 13.6 G/DL (ref 11.5–16)
IMM GRANULOCYTES # BLD AUTO: 0 K/UL
IMM GRANULOCYTES NFR BLD AUTO: 0 %
LYMPHOCYTES # BLD: 1.2 K/UL (ref 0.8–3.5)
LYMPHOCYTES NFR BLD: 11 % (ref 12–49)
MCH RBC QN AUTO: 28.8 PG (ref 26–34)
MCHC RBC AUTO-ENTMCNC: 32.5 G/DL (ref 30–36.5)
MCV RBC AUTO: 88.6 FL (ref 80–99)
MONOCYTES # BLD: 0.6 K/UL (ref 0–1)
MONOCYTES NFR BLD: 6 % (ref 5–13)
NEUTS SEG # BLD: 8.7 K/UL (ref 1.8–8)
NEUTS SEG NFR BLD: 83 % (ref 32–75)
NRBC # BLD: 0 K/UL (ref 0–0.01)
NRBC BLD-RTO: 0 PER 100 WBC
PLATELET # BLD AUTO: 332 K/UL (ref 150–400)
PLATELET COMMENTS,PCOM: ABNORMAL
PMV BLD AUTO: 10.7 FL (ref 8.9–12.9)
POTASSIUM SERPL-SCNC: 3.7 MMOL/L (ref 3.5–5.1)
PROT SERPL-MCNC: 6.3 G/DL (ref 6.4–8.2)
RBC # BLD AUTO: 4.73 M/UL (ref 3.8–5.2)
RBC MORPH BLD: ABNORMAL
SARS-COV-2, NAA: DETECTED
SODIUM SERPL-SCNC: 140 MMOL/L (ref 136–145)
WBC # BLD AUTO: 10.5 K/UL (ref 3.6–11)

## 2021-08-23 PROCEDURE — 74011250636 HC RX REV CODE- 250/636: Performed by: FAMILY MEDICINE

## 2021-08-23 PROCEDURE — 74011000258 HC RX REV CODE- 258: Performed by: FAMILY MEDICINE

## 2021-08-23 PROCEDURE — 36415 COLL VENOUS BLD VENIPUNCTURE: CPT

## 2021-08-23 PROCEDURE — 65270000029 HC RM PRIVATE

## 2021-08-23 PROCEDURE — 74011250637 HC RX REV CODE- 250/637: Performed by: INTERNAL MEDICINE

## 2021-08-23 PROCEDURE — 74011250637 HC RX REV CODE- 250/637: Performed by: FAMILY MEDICINE

## 2021-08-23 PROCEDURE — 85025 COMPLETE CBC W/AUTO DIFF WBC: CPT

## 2021-08-23 PROCEDURE — 74011000250 HC RX REV CODE- 250: Performed by: FAMILY MEDICINE

## 2021-08-23 PROCEDURE — 80053 COMPREHEN METABOLIC PANEL: CPT

## 2021-08-23 RX ORDER — LISINOPRIL 10 MG/1
10 TABLET ORAL DAILY
Status: DISCONTINUED | OUTPATIENT
Start: 2021-08-24 | End: 2021-08-23

## 2021-08-23 RX ORDER — LISINOPRIL 10 MG/1
10 TABLET ORAL DAILY
Status: DISCONTINUED | OUTPATIENT
Start: 2021-08-23 | End: 2021-08-26 | Stop reason: HOSPADM

## 2021-08-23 RX ORDER — AMLODIPINE BESYLATE 5 MG/1
10 TABLET ORAL DAILY
Status: DISCONTINUED | OUTPATIENT
Start: 2021-08-24 | End: 2021-08-26 | Stop reason: HOSPADM

## 2021-08-23 RX ADMIN — AZITHROMYCIN MONOHYDRATE 500 MG: 500 TABLET ORAL at 09:30

## 2021-08-23 RX ADMIN — PIPERACILLIN SODIUM AND TAZOBACTAM SODIUM 3.38 G: 3; .375 INJECTION, POWDER, LYOPHILIZED, FOR SOLUTION INTRAVENOUS at 22:25

## 2021-08-23 RX ADMIN — AMLODIPINE BESYLATE 5 MG: 5 TABLET ORAL at 00:03

## 2021-08-23 RX ADMIN — MELATONIN TAB 3 MG 3 MG: 3 TAB at 22:26

## 2021-08-23 RX ADMIN — ENOXAPARIN SODIUM 40 MG: 40 INJECTION SUBCUTANEOUS at 09:31

## 2021-08-23 RX ADMIN — DEXAMETHASONE SODIUM PHOSPHATE 4 MG: 4 INJECTION, SOLUTION INTRA-ARTICULAR; INTRALESIONAL; INTRAMUSCULAR; INTRAVENOUS; SOFT TISSUE at 18:25

## 2021-08-23 RX ADMIN — REMDESIVIR 100 MG: 100 INJECTION, POWDER, LYOPHILIZED, FOR SOLUTION INTRAVENOUS at 22:25

## 2021-08-23 RX ADMIN — LISINOPRIL 10 MG: 10 TABLET ORAL at 14:41

## 2021-08-23 RX ADMIN — PIPERACILLIN SODIUM AND TAZOBACTAM SODIUM 3.38 G: 3; .375 INJECTION, POWDER, LYOPHILIZED, FOR SOLUTION INTRAVENOUS at 11:14

## 2021-08-23 RX ADMIN — DEXAMETHASONE SODIUM PHOSPHATE 4 MG: 4 INJECTION, SOLUTION INTRA-ARTICULAR; INTRALESIONAL; INTRAMUSCULAR; INTRAVENOUS; SOFT TISSUE at 23:30

## 2021-08-23 RX ADMIN — DEXAMETHASONE SODIUM PHOSPHATE 4 MG: 4 INJECTION, SOLUTION INTRA-ARTICULAR; INTRALESIONAL; INTRAMUSCULAR; INTRAVENOUS; SOFT TISSUE at 11:14

## 2021-08-23 NOTE — PROGRESS NOTES
Bedside and Verbal shift change report given to Danna San (oncoming nurse) by Donna Zaidi RN (offgoing nurse). Report included the following information SBAR, MAR and Recent Results.

## 2021-08-23 NOTE — PROGRESS NOTES
General Daily Progress Note          Patient Name:   Jaylan Paredes       YOB: 1955       Age:  72 y.o. Admit Date: 8/19/2021      Subjective: On 4 L oxygen by nasal cannula           Objective:     Visit Vitals  BP (!) 179/90 (BP 1 Location: Left arm, BP Patient Position: Lying)   Pulse 68   Temp 97.6 °F (36.4 °C)   Resp 20   Ht 5' 8\" (1.727 m)   Wt 141.1 kg (311 lb)   SpO2 96%   BMI 47.29 kg/m²        Recent Results (from the past 24 hour(s))   CBC WITH AUTOMATED DIFF    Collection Time: 08/23/21  7:55 AM   Result Value Ref Range    WBC 10.5 3.6 - 11.0 K/uL    RBC 4.73 3.80 - 5.20 M/uL    HGB 13.6 11.5 - 16.0 g/dL    HCT 41.9 35.0 - 47.0 %    MCV 88.6 80.0 - 99.0 FL    MCH 28.8 26.0 - 34.0 PG    MCHC 32.5 30.0 - 36.5 g/dL    RDW 12.5 11.5 - 14.5 %    PLATELET 442 444 - 008 K/uL    MPV 10.7 8.9 - 12.9 FL    NRBC 0.0 0.0  WBC    ABSOLUTE NRBC 0.00 0.00 - 0.01 K/uL    NEUTROPHILS PENDING %    LYMPHOCYTES PENDING %    MONOCYTES PENDING %    EOSINOPHILS PENDING %    BASOPHILS PENDING %    IMMATURE GRANULOCYTES PENDING %    ABS. NEUTROPHILS PENDING K/UL    ABS. LYMPHOCYTES PENDING K/UL    ABS. MONOCYTES PENDING K/UL    ABS. EOSINOPHILS PENDING K/UL    ABS. BASOPHILS PENDING K/UL    ABS. IMM. GRANS. PENDING K/UL    DF PENDING    METABOLIC PANEL, COMPREHENSIVE    Collection Time: 08/23/21  7:55 AM   Result Value Ref Range    Sodium 140 136 - 145 mmol/L    Potassium 3.7 3.5 - 5.1 mmol/L    Chloride 107 97 - 108 mmol/L    CO2 30 21 - 32 mmol/L    Anion gap 3 (L) 5 - 15 mmol/L    Glucose 152 (H) 65 - 100 mg/dL    BUN 16 6 - 20 mg/dL    Creatinine 0.75 0.55 - 1.02 mg/dL    BUN/Creatinine ratio 21 (H) 12 - 20      GFR est AA >60 >60 ml/min/1.73m2    GFR est non-AA >60 >60 ml/min/1.73m2    Calcium 8.4 (L) 8.5 - 10.1 mg/dL    Bilirubin, total 0.6 0.2 - 1.0 mg/dL    AST (SGOT) 16 15 - 37 U/L    ALT (SGPT) 36 12 - 78 U/L    Alk.  phosphatase 44 (L) 45 - 117 U/L    Protein, total 6.3 (L) 6.4 - 8.2 g/dL    Albumin 3.0 (L) 3.5 - 5.0 g/dL    Globulin 3.3 2.0 - 4.0 g/dL    A-G Ratio 0.9 (L) 1.1 - 2.2       [unfilled]      Review of Systems    Constitutional: Negative for chills and fever. HENT: Negative. Eyes: Negative. Respiratory: Negative. Cardiovascular: Negative. Gastrointestinal: Negative for abdominal pain and nausea. Skin: Negative. Neurological: Negative. Physical Exam:      Constitutional: pt is oriented to person, place, and time. HENT:   Head: Normocephalic and atraumatic. Eyes: Pupils are equal, round, and reactive to light. EOM are normal.   Cardiovascular: Normal rate, regular rhythm and normal heart sounds. Pulmonary/Chest: Breath sounds normal. No wheezes. No rales. Exhibits no tenderness. Abdominal: Soft. Bowel sounds are normal. There is no abdominal tenderness. There is no rebound and no guarding. Musculoskeletal: Normal range of motion. Neurological: pt is alert and oriented to person, place, and time. XR CHEST PORT   Final Result   Bilateral pneumonia. CTA CHEST W OR W WO CONT   Final Result   Geographic groundglass opacities throughout the lungs. Consider   infectious/inflammatory process including viral etiologies. No CT evidence for PE. Cholelithiasis. XR CHEST PORT   Final Result   Bilateral peripheral infiltrates consistent with Covid pneumonia.            Recent Results (from the past 24 hour(s))   CBC WITH AUTOMATED DIFF    Collection Time: 08/23/21  7:55 AM   Result Value Ref Range    WBC 10.5 3.6 - 11.0 K/uL    RBC 4.73 3.80 - 5.20 M/uL    HGB 13.6 11.5 - 16.0 g/dL    HCT 41.9 35.0 - 47.0 %    MCV 88.6 80.0 - 99.0 FL    MCH 28.8 26.0 - 34.0 PG    MCHC 32.5 30.0 - 36.5 g/dL    RDW 12.5 11.5 - 14.5 %    PLATELET 533 896 - 997 K/uL    MPV 10.7 8.9 - 12.9 FL    NRBC 0.0 0.0  WBC    ABSOLUTE NRBC 0.00 0.00 - 0.01 K/uL    NEUTROPHILS PENDING %    LYMPHOCYTES PENDING %    MONOCYTES PENDING %    EOSINOPHILS PENDING %    BASOPHILS PENDING %    IMMATURE GRANULOCYTES PENDING %    ABS. NEUTROPHILS PENDING K/UL    ABS. LYMPHOCYTES PENDING K/UL    ABS. MONOCYTES PENDING K/UL    ABS. EOSINOPHILS PENDING K/UL    ABS. BASOPHILS PENDING K/UL    ABS. IMM. GRANS. PENDING K/UL    DF PENDING    METABOLIC PANEL, COMPREHENSIVE    Collection Time: 08/23/21  7:55 AM   Result Value Ref Range    Sodium 140 136 - 145 mmol/L    Potassium 3.7 3.5 - 5.1 mmol/L    Chloride 107 97 - 108 mmol/L    CO2 30 21 - 32 mmol/L    Anion gap 3 (L) 5 - 15 mmol/L    Glucose 152 (H) 65 - 100 mg/dL    BUN 16 6 - 20 mg/dL    Creatinine 0.75 0.55 - 1.02 mg/dL    BUN/Creatinine ratio 21 (H) 12 - 20      GFR est AA >60 >60 ml/min/1.73m2    GFR est non-AA >60 >60 ml/min/1.73m2    Calcium 8.4 (L) 8.5 - 10.1 mg/dL    Bilirubin, total 0.6 0.2 - 1.0 mg/dL    AST (SGOT) 16 15 - 37 U/L    ALT (SGPT) 36 12 - 78 U/L    Alk.  phosphatase 44 (L) 45 - 117 U/L    Protein, total 6.3 (L) 6.4 - 8.2 g/dL    Albumin 3.0 (L) 3.5 - 5.0 g/dL    Globulin 3.3 2.0 - 4.0 g/dL    A-G Ratio 0.9 (L) 1.1 - 2.2         Results     Procedure Component Value Units Date/Time    CULTURE, BLOOD #2 [[id number]] Collected: 08/19/21 2128    Order Status: Completed Specimen: Blood Updated: 08/23/21 0830     Special Requests: No Special Requests        Culture result: No growth 3 days       CULTURE, BLOOD #1 [[id number]] Collected: 08/19/21 2125    Order Status: Completed Specimen: Blood Updated: 08/23/21 0830     Special Requests: No Special Requests        Culture result: No growth 3 days       CULTURE, BLOOD, PAIRED [[id number]] Collected: 08/19/21 1340    Order Status: Completed Specimen: Blood Updated: 08/23/21 0830     Special Requests: No Special Requests        Culture result: No growth 4 days              Labs:     Recent Labs     08/23/21  0755   WBC 10.5   HGB 13.6   HCT 41.9        Recent Labs     08/23/21  0755      K 3.7      CO2 30   BUN 16   CREA 0.75   *   CA 8.4* Recent Labs     08/23/21  0755   ALT 36   AP 44*   TBILI 0.6   TP 6.3*   ALB 3.0*   GLOB 3.3     No results for input(s): INR, PTP, APTT, INREXT, INREXT in the last 72 hours. No results for input(s): FE, TIBC, PSAT, FERR in the last 72 hours. No results found for: FOL, RBCF   No results for input(s): PH, PCO2, PO2 in the last 72 hours. No results for input(s): CPK, CKNDX, TROIQ in the last 72 hours.     No lab exists for component: CPKMB  No results found for: CHOL, CHOLX, CHLST, CHOLV, HDL, HDLP, LDL, LDLC, DLDLP, TGLX, TRIGL, TRIGP, CHHD, CHHDX  No results found for: [facility]  Lab Results   Component Value Date/Time    Color Yellow 08/19/2021 04:05 PM    Appearance Turbid (A) 08/19/2021 04:05 PM    Specific gravity 1.019 08/19/2021 04:05 PM    pH (UA) 5.0 08/19/2021 04:05 PM    Protein 100 (A) 08/19/2021 04:05 PM    Glucose Negative 08/19/2021 04:05 PM    Ketone Negative 08/19/2021 04:05 PM    Bilirubin Negative 08/19/2021 04:05 PM    Urobilinogen 4.0 (H) 08/19/2021 04:05 PM    Nitrites Negative 08/19/2021 04:05 PM    Leukocyte Esterase Negative 08/19/2021 04:05 PM    Bacteria Negative 08/19/2021 04:05 PM    Bacteria Negative 08/19/2021 04:05 PM    WBC 0-4 08/19/2021 04:05 PM    WBC 0-4 08/19/2021 04:05 PM    RBC 0-5 08/19/2021 04:05 PM    RBC 0-5 08/19/2021 04:05 PM         Assessment:       Acute hypoxemic respiratory failure/on 4 L  COVID-19 pneumonitis  Hypertension    Plan:     Start on amlodipine 10 mg daily lisinopril 10 mg daily  Continue Zithromax IV Zosyn remdesivir and Decadron    Try to wean of oxygen for discharge planning    PT OT consult and respite therapy consult      Current Facility-Administered Medications:     amLODIPine (NORVASC) tablet 5 mg, 5 mg, Oral, DAILY, Brendan Contreras MD, 5 mg at 08/23/21 0003    melatonin tablet 3 mg, 3 mg, Oral, QHS, Brendan Contreras MD, 3 mg at 08/22/21 2131    benzonatate (TESSALON) capsule 200 mg, 200 mg, Oral, TID PRN, Erin Burr MD    0.9% sodium chloride infusion, 25 mL/hr, IntraVENous, CONTINUOUS, Baltazar Higginbotham MD, Last Rate: 25 mL/hr at 08/22/21 0955, 25 mL/hr at 08/22/21 0955    acetaminophen (TYLENOL) tablet 650 mg, 650 mg, Oral, Q6H PRN, 650 mg at 08/20/21 0955 **OR** acetaminophen (TYLENOL) suppository 660 mg, 660 mg, Rectal, Q6H PRN, Eugenia De MD    polyethylene glycol (MIRALAX) packet 17 g, 17 g, Oral, DAILY PRN, Eugenia De MD    ondansetron (ZOFRAN ODT) tablet 4 mg, 4 mg, Oral, Q8H PRN, 4 mg at 08/20/21 0955 **OR** ondansetron (ZOFRAN) injection 4 mg, 4 mg, IntraVENous, Q6H PRN, Anita De MD    enoxaparin (LOVENOX) injection 40 mg, 40 mg, SubCUTAneous, DAILY, Anita De MD, 40 mg at 08/23/21 0931    dexamethasone (DECADRON) 4 mg/mL injection 4 mg, 4 mg, IntraVENous, Q6H, Anita De MD, 4 mg at 08/23/21 1114    azithromycin (ZITHROMAX) tablet 500 mg, 500 mg, Oral, DAILY, Anita De MD, 500 mg at 08/23/21 0930    piperacillin-tazobactam (ZOSYN) 3.375 g in 0.9% sodium chloride (MBP/ADV) 100 mL MBP, 3.375 g, IntraVENous, Q8H, Anita De MD, Last Rate: 25 mL/hr at 08/23/21 1114, 3.375 g at 08/23/21 1114    remdesivir 100 mg in 0.9% sodium chloride 250 mL IVPB, 100 mg, IntraVENous, Q24H, Anita De MD, Last Rate: 500 mL/hr at 08/22/21 2140, 100 mg at 08/22/21 2140

## 2021-08-23 NOTE — PROGRESS NOTES
Reason for Admission:  Hypoxia, COVID                     RUR Score:          9%           Plan for utilizing home health:     Patient politely declined. PCP: First and Last name:  None     Name of Practice:    Are you a current patient: Yes/No:    Approximate date of last visit:    Can you participate in a virtual visit with your PCP:                     Current Advanced Directive/Advance Care Plan: Full Code      Healthcare Decision Maker:   Click here to complete [id number] including selection of the Healthcare Decision Maker Relationship (ie \"Primary\")           None                   Transition of Care Plan:                      CM called patient in her room to complete discharge planning assessment. She is alert, oriented, and able to make her needs known. Prior to admission to the hospital patient was living at home with her . She does not use any DME to assist in ambulation. She politely declined home health services at this time. Discharge disposition: Home    CM team will continue to follow case.

## 2021-08-23 NOTE — PROGRESS NOTES
PULMONARY NOTE  [facility]    Name: Av Serrano MRN: [id number]   : 1955 Hospital: [facility]   Date: 2021  Admission date: 2021 Hospital Day: 5       HPI:     Hospital Problems  Never Reviewed        Codes Class Noted POA    Hypoxia ICD-10-CM: R09.02  ICD-9-CM: 799.02  2021 Unknown        COVID-19 ICD-10-CM: U07.1  ICD-9-CM: 079.89  2021 Unknown                   [x] High complexity decision making was performed  [x] See my orders for details      Subjective/Initial History:     I was asked by Laine López MD to see Av Serrano  a 72 y.o.  female in consultation     Excerpts from admission 2021 or consult notes as follows:   Ms. Ceci Magallanes is a 73 yo F came to the ER d/t SOB, fever, fatigue and generalised weakness. Pt was admitted for COVID19 Pneumonitis -  CXR shows B/L peripheral infiltrates consistent w/ diagnosis. Pulmo consulted for Management.       Pt seen in room up alert awake, sitting n the side of the bed, pt says she has improved SOB from admission O2sat 91% on ventimask 15ml/min Denies complaining generalized weakness dyspnea or cough, able to ambulate to bedside comode with out respiratory difficulty      -O2sat 92% on 4.5 L O2 nasal cannual , No complains denies SOB at rest and in no acute distress    No Known Allergies     MAR reviewed and pertinent medications noted or modified as needed     Current Facility-Administered Medications   Medication    amLODIPine (NORVASC) tablet 5 mg    melatonin tablet 3 mg    benzonatate (TESSALON) capsule 200 mg    0.9% sodium chloride infusion    acetaminophen (TYLENOL) tablet 650 mg    Or    acetaminophen (TYLENOL) suppository 660 mg    polyethylene glycol (MIRALAX) packet 17 g    ondansetron (ZOFRAN ODT) tablet 4 mg    Or    ondansetron (ZOFRAN) injection 4 mg    enoxaparin (LOVENOX) injection 40 mg    dexamethasone (DECADRON) 4 mg/mL injection 4 mg    azithromycin (ZITHROMAX) tablet 500 mg    piperacillin-tazobactam (ZOSYN) 3.375 g in 0.9% sodium chloride (MBP/ADV) 100 mL MBP    remdesivir 100 mg in 0.9% sodium chloride 250 mL IVPB      Patient PCP: None  PMH:  has no past medical history on file. PSH:   has no past surgical history on file. FHX: family history is not on file. SHX:  reports that she has never smoked. She has never used smokeless tobacco. She reports previous alcohol use. She reports that she does not use drugs. ROS:    Review of Systems   Constitutional: Positive for malaise/fatigue. HENT: Negative. Eyes: Negative. Respiratory: Positive for cough and shortness of breath. Negative for sputum production. Cardiovascular: Negative. Gastrointestinal: Negative. Genitourinary: Negative. Musculoskeletal: Negative. Skin: Negative. Neurological: Negative. Psychiatric/Behavioral: Negative. Objective:     Vital Signs: Telemetry:    normal sinus rhythm Intake/Output:   Visit Vitals  BP (!) 190/93 (BP 1 Location: Right arm, BP Patient Position: Sitting)   Pulse 68   Temp 97.6 °F (36.4 °C)   Resp 20   Ht 5' 8\" (1.727 m)   Wt 141.1 kg (311 lb)   SpO2 96%   BMI 47.29 kg/m²       Temp (24hrs), Av.6 °F (36.4 °C), Min:97.3 °F (36.3 °C), Max:97.8 °F (36.6 °C)        O2 Device: Nasal cannula O2 Flow Rate (L/min): 4.5 l/min       Wt Readings from Last 4 Encounters:   21 141.1 kg (311 lb)        No intake or output data in the 24 hours ending 21 1110    Last shift:      No intake/output data recorded. Last 3 shifts: No intake/output data recorded. Physical Exam:     Physical Exam  Constitutional:       Appearance: She is obese. She is ill-appearing. HENT:      Head: Normocephalic and atraumatic. Nose: Nose normal.      Mouth/Throat:      Mouth: Mucous membranes are moist.   Eyes:      Pupils: Pupils are equal, round, and reactive to light.    Cardiovascular:      Rate and Rhythm: Normal rate and regular rhythm. Pulses: Normal pulses. Heart sounds: Normal heart sounds. Pulmonary:      Effort: Respiratory distress present. Breath sounds: No wheezing or rhonchi. Abdominal:      General: Abdomen is flat. Bowel sounds are normal.      Palpations: Abdomen is soft. Musculoskeletal:         General: Normal range of motion. Cervical back: Normal range of motion and neck supple. Skin:     General: Skin is warm. Neurological:      General: No focal deficit present. Mental Status: She is alert. Psychiatric:         Mood and Affect: Mood normal.          Labs:    Recent Labs     08/23/21  0755   WBC 10.5   HGB 13.6        Recent Labs     08/23/21  0755      K 3.7      CO2 30   *   BUN 16   CREA 0.75   CA 8.4*   ALB 3.0*   ALT 36     No results for input(s): PH, PCO2, PO2, HCO3, FIO2 in the last 72 hours. No results for input(s): CPK, CKNDX, TROIQ in the last 72 hours. No lab exists for component: CPKMB  No results found for: BNPP, BNP   Lab Results   Component Value Date/Time    Culture result: No growth 3 days 08/19/2021 09:28 PM    Culture result: No growth 3 days 08/19/2021 09:25 PM    Culture result: No growth 4 days 08/19/2021 01:40 PM   No results found for: TSH, TSHEXT, TSHEXT    Imaging:    CXR Results  (Last 48 hours)               08/22/21 1010  XR CHEST PORT Final result    Impression:  Bilateral pneumonia. Narrative:  Chest one view. AP upright portable at 1009 dated 8/22/2021. Comparison 8/19/2021. Bilateral pneumonia is redemonstrated with increasing   opacity in the right upper lobe from before. There is no pleural fluid or   pneumothorax. The cardiomediastinal silhouette is stable. Results from [place] encounter on 08/19/21    XR CHEST PORT    Narrative  Chest one view. AP upright portable at 1009 dated 8/22/2021. Comparison 8/19/2021.  Bilateral pneumonia is redemonstrated with increasing  opacity in the right upper lobe from before. There is no pleural fluid or  pneumothorax. The cardiomediastinal silhouette is stable. Impression  Bilateral pneumonia. XR CHEST PORT    Narrative  Portable upright radiograph of the chest 1:45 p.m. no prior study. INDICATION: Covid positive, hypoxia. Heart size is at the upper limits of normal with a tortuous aorta. Bilateral  peripheral infiltrates are noted consistent with history of Covid positive  status. No effusion or pneumothorax. No acute bony findings. Impression  Bilateral peripheral infiltrates consistent with Covid pneumonia. Results from [place] encounter on 08/19/21    CTA CHEST W OR W WO CONT    Narrative  CTA chest.    Axial images are reviewed along with reformatted sagittal/coronal/MIP images. 100 mL Isovue 370 administered. Dose reduction: All CT scans at this facility are performed using dose reduction  optimization techniques as appropriate to a performed exam including the  following-  automated exposure control, adjustments of mA and/or Kv according to patient  size, or use of iterative reconstructive technique. Geographic groundglass opacities present throughout the lungs. Findings in  keeping with infectious/inflammatory process including viral etiologies. No  pleural effusion. Enhanced images demonstrate normal contrast opacification, normal caliber  pulmonary arterial trunk. Central pulmonary arterial branch vessels normally  opacify; no CT evidence for PE. Pulsatile artifact ascending thoracic aorta. Otherwise, thoracic aortic arch, great vessels traversing the superior  mediastinum, and ascending thoracic aorta normally opacify. Imaged content upper abdomen reveals 2 cm gallstone gallbladder lumen. Impression  Geographic groundglass opacities throughout the lungs. Consider  infectious/inflammatory process including viral etiologies.     No CT evidence for PE.    Cholelithiasis. IMPRESSION:   1. COVID Pneumonia -  8/19 CXR shows B/L peripheral infiltrates consistent w/dx COVID-19 pneumonia  2. Acute hypoxic respiratory failure   3. Rule out obstructive sleep apnea syndrome  Body mass index is 47.29 kg/m². 4. Pt is requiring Drug therapy requiring intensive monitoring for toxicity  5. Pt is stable but Prognosis guarded       RECOMMENDATIONS/PLAN:     1. COVID-19 positive- not vaccinated on oxygen via nasal cannula 4.5 L  2. Supplemental O2 to keep sats > 93%  3. Remdesivir 200 mg in 0.9% sodium chloride 250 mL  4. Zosyn, Decadron, Zithromax   5. She needs sleep study as an outpatient  6.  Chest x-ray still showed bilateral infiltrate consistent with COVID-19 pneumonia       ·         Johnny Espinosa MD

## 2021-08-23 NOTE — PROGRESS NOTES
PULMONARY NOTE  [facility]    Name: Claudetta Shams MRN: [id number]   : 1955 Hospital: [facility]   Date: 2021  Admission date: 2021 Hospital Day: 5       HPI:     Hospital Problems  Never Reviewed        Codes Class Noted POA    Hypoxia ICD-10-CM: R09.02  ICD-9-CM: 799.02  2021 Unknown        COVID-19 ICD-10-CM: U07.1  ICD-9-CM: 079.89  2021 Unknown                   [x] High complexity decision making was performed  [x] See my orders for details      Subjective/Initial History:     I was asked by Donal Posey MD to see Claudetta Shams  a 72 y.o.  female in consultation     Excerpts from admission 2021 or consult notes as follows:   Ms. Ryan Connolly is a 71 yo F came to the ER d/t SOB, fever, fatigue and generalised weakness. Pt was admitted for COVID19 Pneumonitis -  CXR shows B/L peripheral infiltrates consistent w/ diagnosis. Pulmo consulted for Management.       Pt seen in room up alert awake, sitting n the side of the bed, pt says she has improved SOB from admission O2sat 91% on ventimask 15ml/min Denies complaining generalized weakness dyspnea or cough, able to ambulate to bedside comode with out respiratory difficulty      -O2sat 92% on 4.5 L O2 nasal cannual , No complains denies SOB at rest and in no acute distress    No Known Allergies     MAR reviewed and pertinent medications noted or modified as needed     Current Facility-Administered Medications   Medication    amLODIPine (NORVASC) tablet 5 mg    melatonin tablet 3 mg    benzonatate (TESSALON) capsule 200 mg    0.9% sodium chloride infusion    acetaminophen (TYLENOL) tablet 650 mg    Or    acetaminophen (TYLENOL) suppository 660 mg    polyethylene glycol (MIRALAX) packet 17 g    ondansetron (ZOFRAN ODT) tablet 4 mg    Or    ondansetron (ZOFRAN) injection 4 mg    enoxaparin (LOVENOX) injection 40 mg    dexamethasone (DECADRON) 4 mg/mL injection 4 mg    azithromycin (ZITHROMAX) tablet 500 mg    piperacillin-tazobactam (ZOSYN) 3.375 g in 0.9% sodium chloride (MBP/ADV) 100 mL MBP    remdesivir 100 mg in 0.9% sodium chloride 250 mL IVPB      Patient PCP: None  PMH:  has no past medical history on file. PSH:   has no past surgical history on file. FHX: family history is not on file. SHX:  reports that she has never smoked. She has never used smokeless tobacco. She reports previous alcohol use. She reports that she does not use drugs. ROS:    Review of Systems   Constitutional: Positive for malaise/fatigue. HENT: Negative. Eyes: Negative. Respiratory: Positive for cough and shortness of breath. Negative for sputum production. Cardiovascular: Negative. Gastrointestinal: Negative. Genitourinary: Negative. Musculoskeletal: Negative. Skin: Negative. Neurological: Negative. Psychiatric/Behavioral: Negative. Objective:     Vital Signs: Telemetry:    normal sinus rhythm Intake/Output:   Visit Vitals  BP (!) 163/78   Pulse (!) 55   Temp 97.3 °F (36.3 °C)   Resp 20   Ht 5' 8\" (1.727 m)   Wt 311 lb (141.1 kg)   SpO2 92%   BMI 47.29 kg/m²       Temp (24hrs), Av.6 °F (36.4 °C), Min:97.3 °F (36.3 °C), Max:97.8 °F (36.6 °C)        O2 Device: Nasal cannula O2 Flow Rate (L/min): 4.5 l/min       Wt Readings from Last 4 Encounters:   21 311 lb (141.1 kg)        No intake or output data in the 24 hours ending 21 0901    Last shift:      No intake/output data recorded. Last 3 shifts: No intake/output data recorded. Physical Exam:     Physical Exam  Constitutional:       Appearance: She is obese. She is ill-appearing. HENT:      Head: Normocephalic and atraumatic. Nose: Nose normal.      Mouth/Throat:      Mouth: Mucous membranes are moist.   Eyes:      Pupils: Pupils are equal, round, and reactive to light. Cardiovascular:      Rate and Rhythm: Normal rate and regular rhythm. Pulses: Normal pulses. Heart sounds: Normal heart sounds. Pulmonary:      Effort: Respiratory distress present. Breath sounds: No wheezing or rhonchi. Abdominal:      General: Abdomen is flat. Bowel sounds are normal.      Palpations: Abdomen is soft. Musculoskeletal:         General: Normal range of motion. Cervical back: Normal range of motion and neck supple. Skin:     General: Skin is warm. Neurological:      General: No focal deficit present. Mental Status: She is alert. Psychiatric:         Mood and Affect: Mood normal.          Labs:    Recent Labs     08/23/21  0755   WBC 10.5   HGB 13.6        Recent Labs     08/23/21  0755      K 3.7      CO2 30   *   BUN 16   CREA 0.75   CA 8.4*   ALB 3.0*   ALT 36     No results for input(s): PH, PCO2, PO2, HCO3, FIO2 in the last 72 hours. No results for input(s): CPK, CKNDX, TROIQ in the last 72 hours. No lab exists for component: CPKMB  No results found for: BNPP, BNP   Lab Results   Component Value Date/Time    Culture result: No growth 3 days 08/19/2021 09:28 PM    Culture result: No growth 3 days 08/19/2021 09:25 PM    Culture result: No growth 4 days 08/19/2021 01:40 PM   No results found for: TSH, TSHEXT, TSHEXT    Imaging:    CXR Results  (Last 48 hours)               08/22/21 1010  XR CHEST PORT Final result    Impression:  Bilateral pneumonia. Narrative:  Chest one view. AP upright portable at 1009 dated 8/22/2021. Comparison 8/19/2021. Bilateral pneumonia is redemonstrated with increasing   opacity in the right upper lobe from before. There is no pleural fluid or   pneumothorax. The cardiomediastinal silhouette is stable. Results from [place] encounter on 08/19/21    XR CHEST PORT    Narrative  Chest one view. AP upright portable at 1009 dated 8/22/2021. Comparison 8/19/2021. Bilateral pneumonia is redemonstrated with increasing  opacity in the right upper lobe from before. There is no pleural fluid or  pneumothorax. The cardiomediastinal silhouette is stable. Impression  Bilateral pneumonia. XR CHEST PORT    Narrative  Portable upright radiograph of the chest 1:45 p.m. no prior study. INDICATION: Covid positive, hypoxia. Heart size is at the upper limits of normal with a tortuous aorta. Bilateral  peripheral infiltrates are noted consistent with history of Covid positive  status. No effusion or pneumothorax. No acute bony findings. Impression  Bilateral peripheral infiltrates consistent with Covid pneumonia. Results from [place] encounter on 08/19/21    CTA CHEST W OR W WO CONT    Narrative  CTA chest.    Axial images are reviewed along with reformatted sagittal/coronal/MIP images. 100 mL Isovue 370 administered. Dose reduction: All CT scans at this facility are performed using dose reduction  optimization techniques as appropriate to a performed exam including the  following-  automated exposure control, adjustments of mA and/or Kv according to patient  size, or use of iterative reconstructive technique. Geographic groundglass opacities present throughout the lungs. Findings in  keeping with infectious/inflammatory process including viral etiologies. No  pleural effusion. Enhanced images demonstrate normal contrast opacification, normal caliber  pulmonary arterial trunk. Central pulmonary arterial branch vessels normally  opacify; no CT evidence for PE. Pulsatile artifact ascending thoracic aorta. Otherwise, thoracic aortic arch, great vessels traversing the superior  mediastinum, and ascending thoracic aorta normally opacify. Imaged content upper abdomen reveals 2 cm gallstone gallbladder lumen. Impression  Geographic groundglass opacities throughout the lungs. Consider  infectious/inflammatory process including viral etiologies. No CT evidence for PE. Cholelithiasis. IMPRESSION:   1.  COVID Pneumonia -  8/19 CXR shows B/L peripheral infiltrates consistent w/dx COVID-19 pneumonia  2. Acute hypoxic respiratory failure   3. Rule out obstructive sleep apnea syndrome  Body mass index is 47.29 kg/m². 4. Pt is requiring Drug therapy requiring intensive monitoring for toxicity  5. Pt is stable but Prognosis guarded       RECOMMENDATIONS/PLAN:     1. COVID-19 positive- not vaccinated on oxygen via Ventimask  O2sat 91% transition to nasal cannula in a.m.  2. Supplemental O2 to keep sats > 93%  3. Remdesivir 200 mg in 0.9% sodium chloride 250 mL  4. Zosyn, Decadron, Zithromax   5. She needs sleep study as an outpatient  6.  We will get chest x-ray today       ·         Maria Luisa Trinidad

## 2021-08-24 PROCEDURE — 74011250637 HC RX REV CODE- 250/637: Performed by: INTERNAL MEDICINE

## 2021-08-24 PROCEDURE — 94760 N-INVAS EAR/PLS OXIMETRY 1: CPT

## 2021-08-24 PROCEDURE — 74011250637 HC RX REV CODE- 250/637: Performed by: FAMILY MEDICINE

## 2021-08-24 PROCEDURE — 97530 THERAPEUTIC ACTIVITIES: CPT

## 2021-08-24 PROCEDURE — 74011250636 HC RX REV CODE- 250/636: Performed by: FAMILY MEDICINE

## 2021-08-24 PROCEDURE — 65270000029 HC RM PRIVATE

## 2021-08-24 PROCEDURE — 74011000258 HC RX REV CODE- 258: Performed by: FAMILY MEDICINE

## 2021-08-24 PROCEDURE — 97165 OT EVAL LOW COMPLEX 30 MIN: CPT

## 2021-08-24 PROCEDURE — 97161 PT EVAL LOW COMPLEX 20 MIN: CPT

## 2021-08-24 PROCEDURE — 77010033678 HC OXYGEN DAILY

## 2021-08-24 RX ADMIN — PIPERACILLIN SODIUM AND TAZOBACTAM SODIUM 3.38 G: 3; .375 INJECTION, POWDER, LYOPHILIZED, FOR SOLUTION INTRAVENOUS at 22:38

## 2021-08-24 RX ADMIN — DEXAMETHASONE SODIUM PHOSPHATE 4 MG: 4 INJECTION, SOLUTION INTRA-ARTICULAR; INTRALESIONAL; INTRAMUSCULAR; INTRAVENOUS; SOFT TISSUE at 11:19

## 2021-08-24 RX ADMIN — PIPERACILLIN SODIUM AND TAZOBACTAM SODIUM 3.38 G: 3; .375 INJECTION, POWDER, LYOPHILIZED, FOR SOLUTION INTRAVENOUS at 11:19

## 2021-08-24 RX ADMIN — AZITHROMYCIN MONOHYDRATE 500 MG: 500 TABLET ORAL at 08:58

## 2021-08-24 RX ADMIN — DEXAMETHASONE SODIUM PHOSPHATE 4 MG: 4 INJECTION, SOLUTION INTRA-ARTICULAR; INTRALESIONAL; INTRAMUSCULAR; INTRAVENOUS; SOFT TISSUE at 05:14

## 2021-08-24 RX ADMIN — MELATONIN TAB 3 MG 3 MG: 3 TAB at 22:37

## 2021-08-24 RX ADMIN — AMLODIPINE BESYLATE 10 MG: 5 TABLET ORAL at 08:57

## 2021-08-24 RX ADMIN — DEXAMETHASONE SODIUM PHOSPHATE 4 MG: 4 INJECTION, SOLUTION INTRA-ARTICULAR; INTRALESIONAL; INTRAMUSCULAR; INTRAVENOUS; SOFT TISSUE at 23:31

## 2021-08-24 RX ADMIN — ENOXAPARIN SODIUM 40 MG: 40 INJECTION SUBCUTANEOUS at 08:59

## 2021-08-24 RX ADMIN — LISINOPRIL 10 MG: 10 TABLET ORAL at 08:58

## 2021-08-24 RX ADMIN — DEXAMETHASONE SODIUM PHOSPHATE 4 MG: 4 INJECTION, SOLUTION INTRA-ARTICULAR; INTRALESIONAL; INTRAMUSCULAR; INTRAVENOUS; SOFT TISSUE at 17:33

## 2021-08-24 RX ADMIN — PIPERACILLIN SODIUM AND TAZOBACTAM SODIUM 3.38 G: 3; .375 INJECTION, POWDER, LYOPHILIZED, FOR SOLUTION INTRAVENOUS at 05:14

## 2021-08-24 NOTE — PROGRESS NOTES
PULMONARY NOTE  [facility]    Name: Jose Penn MRN: [id number]   : 1955 Hospital: [address] [place] [address]   Date: 2021  Admission date: 2021 Hospital Day: 6       HPI:     Hospital Problems  Never Reviewed        Codes Class Noted POA    Hypoxia ICD-10-CM: R09.02  ICD-9-CM: 799.02  2021 Unknown        COVID-19 ICD-10-CM: U07.1  ICD-9-CM: 079.89  2021 Unknown                   [x] High complexity decision making was performed  [x] See my orders for details      Subjective/Initial History:     I was asked by Clara Ramirez MD to see Jose Penn  a 72 y.o.  female in consultation     Excerpts from admission 2021 or consult notes as follows:   Ms. Jana Cabrera is a 73 yo F came to the ER d/t SOB, fever, fatigue and generalised weakness. Pt was admitted for COVID19 Pneumonitis -  CXR shows B/L peripheral infiltrates consistent w/ diagnosis. Pulmo consulted for Management.       -O2sat 92% on 4 L O2 nasal cannula pt says she feels well denies SOB does not mild throat discomfort may be d/t to post nasal drip overall in no acute distress       MAR reviewed and pertinent medications noted or modified as needed     Current Facility-Administered Medications   Medication    amLODIPine (NORVASC) tablet 10 mg    lisinopriL (PRINIVIL, ZESTRIL) tablet 10 mg    melatonin tablet 3 mg    benzonatate (TESSALON) capsule 200 mg    0.9% sodium chloride infusion    acetaminophen (TYLENOL) tablet 650 mg    Or    acetaminophen (TYLENOL) suppository 660 mg    polyethylene glycol (MIRALAX) packet 17 g    ondansetron (ZOFRAN ODT) tablet 4 mg    Or    ondansetron (ZOFRAN) injection 4 mg    enoxaparin (LOVENOX) injection 40 mg    dexamethasone (DECADRON) 4 mg/mL injection 4 mg    azithromycin (ZITHROMAX) tablet 500 mg    piperacillin-tazobactam (ZOSYN) 3.375 g in 0.9% sodium chloride (MBP/ADV) 100 mL MBP    remdesivir 100 mg in 0.9% sodium chloride 250 mL IVPB      Patient PCP: None  PMH:  has no past medical history on file. PSH:   has no past surgical history on file. FHX: family history is not on file. SHX:  reports that she has never smoked. She has never used smokeless tobacco. She reports previous alcohol use. She reports that she does not use drugs. ROS:    Review of Systems   Constitutional: Negative for malaise/fatigue. HENT: Positive for sore throat. Eyes: Negative. Respiratory: Positive for cough. Negative for sputum production and shortness of breath. Cardiovascular: Negative. Gastrointestinal: Negative. Genitourinary: Negative. Musculoskeletal: Negative. Skin: Negative. Neurological: Negative. Psychiatric/Behavioral: Negative. Objective:     Vital Signs: Telemetry:    normal sinus rhythm Intake/Output:   Visit Vitals  BP (!) 162/79 (BP 1 Location: Right upper arm, BP Patient Position: Sitting)   Pulse 72   Temp 97.3 °F (36.3 °C)   Resp 18   Ht 5' 8\" (1.727 m)   Wt 311 lb (141.1 kg)   SpO2 92%   BMI 47.29 kg/m²       Temp (24hrs), Av.5 °F (36.4 °C), Min:97.3 °F (36.3 °C), Max:97.6 °F (36.4 °C)        O2 Device: Nasal cannula O2 Flow Rate (L/min): 4 l/min       Wt Readings from Last 4 Encounters:   21 311 lb (141.1 kg)        No intake or output data in the 24 hours ending 21 0852    Last shift:      No intake/output data recorded. Last 3 shifts: No intake/output data recorded. Physical Exam:     Physical Exam  Constitutional:       Appearance: She is obese. She is ill-appearing. HENT:      Head: Normocephalic and atraumatic. Nose: Nose normal.      Mouth/Throat:      Mouth: Mucous membranes are moist.   Eyes:      Pupils: Pupils are equal, round, and reactive to light. Cardiovascular:      Rate and Rhythm: Normal rate and regular rhythm. Pulses: Normal pulses. Heart sounds: Normal heart sounds. Pulmonary:      Effort: Respiratory distress present.       Breath sounds: No wheezing or rhonchi. Abdominal:      General: Abdomen is flat. Bowel sounds are normal.      Palpations: Abdomen is soft. Musculoskeletal:         General: Normal range of motion. Cervical back: Normal range of motion and neck supple. Skin:     General: Skin is warm. Neurological:      General: No focal deficit present. Mental Status: She is alert. Psychiatric:         Mood and Affect: Mood normal.          Labs:    Recent Labs     08/23/21  0755   WBC 10.5   HGB 13.6        Recent Labs     08/23/21  0755      K 3.7      CO2 30   *   BUN 16   CREA 0.75   CA 8.4*   ALB 3.0*   ALT 36     No results for input(s): PH, PCO2, PO2, HCO3, FIO2 in the last 72 hours. No results for input(s): CPK, CKNDX, TROIQ in the last 72 hours. No lab exists for component: CPKMB  No results found for: BNPP, BNP   Lab Results   Component Value Date/Time    Culture result: No growth 3 days 08/19/2021 09:28 PM    Culture result: No growth 3 days 08/19/2021 09:25 PM    Culture result: No growth 4 days 08/19/2021 01:40 PM   No results found for: TSH, TSHEXT, TSHEXT    Imaging:    CXR Results  (Last 48 hours)               08/22/21 1010  XR CHEST PORT Final result    Impression:  Bilateral pneumonia. Narrative:  Chest one view. AP upright portable at 1009 dated 8/22/2021. Comparison 8/19/2021. Bilateral pneumonia is redemonstrated with increasing   opacity in the right upper lobe from before. There is no pleural fluid or   pneumothorax. The cardiomediastinal silhouette is stable. Results from [place] encounter on 08/19/21    XR CHEST PORT    Narrative  Chest one view. AP upright portable at 1009 dated 8/22/2021. Comparison 8/19/2021. Bilateral pneumonia is redemonstrated with increasing  opacity in the right upper lobe from before. There is no pleural fluid or  pneumothorax. The cardiomediastinal silhouette is stable.     Impression  Bilateral pneumonia. XR CHEST PORT    Narrative  Portable upright radiograph of the chest 1:45 p.m. no prior study. INDICATION: Covid positive, hypoxia. Heart size is at the upper limits of normal with a tortuous aorta. Bilateral  peripheral infiltrates are noted consistent with history of Covid positive  status. No effusion or pneumothorax. No acute bony findings. Impression  Bilateral peripheral infiltrates consistent with Covid pneumonia. Results from [place] encounter on 08/19/21    CTA CHEST W OR W WO CONT    Narrative  CTA chest.    Axial images are reviewed along with reformatted sagittal/coronal/MIP images. 100 mL Isovue 370 administered. Dose reduction: All CT scans at this facility are performed using dose reduction  optimization techniques as appropriate to a performed exam including the  following-  automated exposure control, adjustments of mA and/or Kv according to patient  size, or use of iterative reconstructive technique. Geographic groundglass opacities present throughout the lungs. Findings in  keeping with infectious/inflammatory process including viral etiologies. No  pleural effusion. Enhanced images demonstrate normal contrast opacification, normal caliber  pulmonary arterial trunk. Central pulmonary arterial branch vessels normally  opacify; no CT evidence for PE. Pulsatile artifact ascending thoracic aorta. Otherwise, thoracic aortic arch, great vessels traversing the superior  mediastinum, and ascending thoracic aorta normally opacify. Imaged content upper abdomen reveals 2 cm gallstone gallbladder lumen. Impression  Geographic groundglass opacities throughout the lungs. Consider  infectious/inflammatory process including viral etiologies. No CT evidence for PE. Cholelithiasis. IMPRESSION:   1. COVID Pneumonia -  8/19 CXR shows B/L peripheral infiltrates consistent w/dx COVID-19 pneumonia  2. Acute hypoxic respiratory failure   3.  Rule out obstructive sleep apnea syndrome  Body mass index is 47.29 kg/m². 4. Pt is requiring Drug therapy requiring intensive monitoring for toxicity  5. Pt is stable but Prognosis guarded       RECOMMENDATIONS/PLAN:     1. Repeat COVID testing 8/23 positive for Novel COVID-19  2. Pt was not vaccinated   3. on oxygen via nasal cannula 4L O2 sat 92%  4. Supplemental O2 to keep sats > 93%  5. Remdesivir 200 mg in 0.9% sodium chloride 250 mL  6. Zosyn, Decadron, Zithromax   7. She needs sleep study as an outpatient  8.  Chest x-ray still showed bilateral infiltrate consistent with COVID-19 pneumonia       ·         [place] Copper

## 2021-08-24 NOTE — ROUTINE PROCESS
Bedside and Verbal shift change report given to OMAIRA NOLAND (oncoming nurse) by Madeleine Fuentes RN  (offgoing nurse). Report included the following information SBAR, Kardex and Recent Results OMAIRA GRANT.

## 2021-08-24 NOTE — PROGRESS NOTES
OCCUPATIONAL THERAPY EVALUATION  Patient: Nathaniel Escalera (32 y.o. female)  Date: 8/24/2021  Primary Diagnosis: Hypoxia [R09.02]  COVID-19 [U07.1]        Precautions: COVID +    ASSESSMENT  Patient is a 72year old female, who came to the ED with shortness of breath, fever, malaise and admitted for hypoxia, COVID-19 on 8/19/2021. PMH includes: no pertinent PMH. Based on the objective data described below, the patient presents with oxygen desaturation with mobility, slightly decreased standing balance, new O2 use and increased need for A with self care and functional mobility/transfers. Patient sitting EOB upon OT/PT arrival and agreeable to working with therapy. Patient A&O x4 and per pt report, pt lives with  in a single story house with 3 JIM and sher handrails. Patient reports ambulating with no AD, no DME in the home, patient independent for ADLs/IADLs PTA. Patient does not wear oxygen at home, currently on 4.5 L via nasal cannula. Patient mod I scooting EOB, spv sit <> stand. Patient ambulating to and from bathroom with gait belt and spv, spv for toilet transfer. Patient returned to EOB and oxygen at 88-89% following mobility. Patient reports she has been getting up to sink but has to take off oxygen to get to sink, educated on importance of keeping oxygen in place due to desaturation while on O2 and patient setup to brush teeth sitting EOB for increased safety, pt verbalized understanding. Patient setup A to slide on shoes while EOB as shoes were initially under bed. Patient would benefit from continued skilled OT services to address above deficits and improve safety and independence with self care and functional mobility/transfers. Recommend discharge to home with [address] [place] [address] when medically appropriate. May not need HHOT if patient discharges without supplemental oxygen. Current Level of Function Impacting Discharge (ADLs/self-care): setup for grooming and LE dressing.     Other factors to consider for discharge: new O2 use, close to baseline        PLAN :  Recommendations and Planned Interventions: self care training, functional mobility training, therapeutic exercise, balance training, therapeutic activities, endurance activities, patient education, home safety training and family training/education    Frequency/Duration: Patient will be followed by occupational therapy 2 times a week to address goals. Recommendation for discharge: (in order for the patient to meet his/her long term goals)  HHOT    This discharge recommendation:  Has been made in collaboration with the attending provider and/or case management    IF patient discharges home will need the following DME: none       SUBJECTIVE:   Patient stated Believe me, I am ready to go home.     OBJECTIVE DATA SUMMARY:   HISTORY:   History reviewed. No pertinent past medical history. History reviewed. No pertinent surgical history. Expanded or extensive additional review of patient history:     Home Situation  Home Environment: Private residence  # Steps to Enter: 3  Rails to Enter: Yes  Hand Rails : Bilateral  One/Two Story Residence: One story  Living Alone: No (lives with )  Support Systems: Spouse/Significant Other/Partner  Patient Expects to be Discharged to[facility]  Current DME Used/Available at Home: None    PLOF: Pt I for ADLS/IADLS, I with mobility prior to admission. EXAMINATION OF PERFORMANCE DEFICITS:  Cognitive/Behavioral Status:  Neurologic State: Alert  Orientation Level: Oriented X4  Cognition: Follows commands; Appropriate decision making  Safety/Judgement: Fall prevention;Home safety    Skin: intact where visible    Edema: BLE swelling, pt reports is baseline    Hearing: Auditory  Auditory Impairment: None    Vision/Perceptual:    Corrective Lenses: Reading glasses    Range of Motion:  AROM: Within functional limits    Strength:  Strength:  Within functional limits    Coordination:  Generally WFL    Tone & Sensation:  Tone: Normal    Balance:  Sitting: Intact  Standing: Impaired; Without support  Standing - Static: Good  Standing - Dynamic : Fair;Occasional    Functional Mobility and Transfers for ADLs:  Bed Mobility:  Scooting: Modified independent    Transfers:  Sit to Stand: Supervision  Stand to Sit: Supervision  Bathroom Mobility: Supervision/set up  Toilet Transfer : Supervision  Assistive Device : Gait Belt    ADL Assessment:    Oral Facial Hygiene/Grooming: Setup     Lower Body Dressing: Setup    Toileting: Other (comment) (pt declined)    ADL Intervention and task modifications:    Grooming  Grooming Assistance: Set-up  Position Performed: Seated edge of bed  Brushing Teeth: Set-up    Lower Body Dressing Assistance  Dressing Assistance: Set-up  Slip on Shoes Without Back: Set-up  Position Performed: Seated edge of bed    Cognitive Retraining  Safety/Judgement: Fall prevention;Home safety    Therapeutic Exercise:  Patient verbalized understanding of general AROM to complete throughout stay, will benefit from follow up to ensure carryover     Functional Measure:    [employer] AM-PACTM \"6 Clicks\"                                                       Daily Activity Inpatient Short Form  How much help from another person does the patient currently need. .. Total; A Lot A Little None   1. Putting on and taking off regular lower body clothing? []  1 []  2 [x]  3 []  4   2. Bathing (including washing, rinsing, drying)? []  1 []  2 [x]  3 []  4   3. Toileting, which includes using toilet, bedpan or urinal? [] 1 []  2 [x]  3 []  4   4. Putting on and taking off regular upper body clothing? []  1 []  2 []  3 [x]  4   5. Taking care of personal grooming such as brushing teeth? []  1 []  2 [x]  3 []  4   6. Eating meals? []  1 []  2 []  3 [x]  4   © 2007, Trustees of [employer], under license to [employer].  All rights reserved     Score: 20/24     Interpretation of Tool:  Represents clinically-significant functional categories (i.e. Activities of daily living). Percentage of Impairment CH    0%   CI    1-19% CJ    20-39% CK    40-59% CL    60-79% CM    80-99% CN     100%   [facility]  Score 6-24 24 23 20-22 15-19 10-14 7-9 6        Occupational Therapy Evaluation Charge Determination   History Examination Decision-Making   LOW Complexity : Brief history review  LOW Complexity : 1-3 performance deficits relating to physical, cognitive , or psychosocial skils that result in activity limitations and / or participation restrictions  LOW Complexity : No comorbidities that affect functional and no verbal or physical assistance needed to complete eval tasks       Based on the above components, the patient evaluation is determined to be of the following complexity level: LOW     Pain Ratin/10    Activity Tolerance:   Fair, desaturates with exertion and requires oxygen and observed SOB with activity    After treatment patient left in no apparent distress:    sitting EOB with call light within reach    COMMUNICATION/EDUCATION:   The patients plan of care was discussed with: Physical therapist and Registered nurse. Home safety education was provided and the patient/caregiver indicated understanding., Patient/family have participated as able in goal setting and plan of care. and Patient/family agree to work toward stated goals and plan of care. This patients plan of care is appropriate for delegation to [facility].     OT/PT sessions occurred together for increased patient and clinician safety    Problem: Self Care Deficits Care Plan (Adult)  Goal: *Acute Goals and Plan of Care (Insert Text)  Description: Pt will be I sup <> sit in prep for EOB ADLs  Pt will be I grooming sitting EOB  Pt will be I LE dressing sitting EOB/long sit  Pt will be I sit <>  prep for toileting  Pt will be I toileting/toilet transfer/cloth mgmt  Pt will be I following UE HEP in prep for self care tasks   Outcome: Not Met     Thank you for this referral.  Himanshu Courtney, OTR/L  Time Calculation: 19 mins

## 2021-08-24 NOTE — PROGRESS NOTES
Comprehensive Nutrition Assessment    Type and Reason for Visit: Initial (Mild (1/3 PO))    Nutrition Recommendations/Plan:   Continue current diet  Nursing to monitor BM, I/Os, %PO    Nutrition Assessment:  Admitted 2/2 hypoxia. Current appetite reported as good but food has been arriving cold which decreases her appetite. Per pt 1/3%PO regular. Nutrition intervention- RD to add ONS and snacks per pt request. Labs: Alb 3. . Ca 8.4. Alk phos 44. TP 6.3. Meds reviewed. Malnutrition Assessment:  Malnutrition Status:  Mild malnutrition    Context:  Acute illness     Findings of the 6 clinical characteristics of malnutrition:   Energy Intake:  7 - 50% or less of est energy requirements for 5 or more days  Weight Loss:  No significant weight loss     Body Fat Loss:  No significant body fat loss,     Muscle Mass Loss:  No significant muscle mass loss,    Fluid Accumulation:  No significant fluid accumulation,      Nutrition Related Findings:  No NFPE performed 2/2 COVID+. Denies N/V/C/D. BM 8/23 normal per pt. Denies C/S issues. Edema: generalized non-pitting. LLE/RLE 2+. Wounds:    None       Current Nutrition Therapies:  ADULT DIET Regular  ADULT ORAL NUTRITION SUPPLEMENT AM Snack, PM Snack; Standard High Calorie/High Protein  ADULT ORAL NUTRITION SUPPLEMENT HS Snack;  Snack; Jello    Anthropometric Measures:  · Height:  5' 8\" (172.7 cm)  · Current Body Wt:  141.1 kg (311 lb)   · Admission Body Wt:  311 lb    · Usual Body Wt:  141.1 kg (311 lb)     · Ideal Body Wt:  140 lbs:  222.1 %   · BMI Category:  Obese class 3 (BMI 40.0 or greater)     Wt Readings from Last 3 Encounters:   08/19/21 141.1 kg (311 lb)   ·     Nutrition Diagnosis:   · Inadequate oral intake related to inadequate protein-energy intake as evidenced by intake 26-50%      Nutrition Interventions:   Food and/or Nutrient Delivery: Continue current diet, Continue oral nutrition supplement  Nutrition Education and Counseling: No recommendations at this time  Coordination of Nutrition Care: Continue to monitor while inpatient      Nutrition Monitoring and Evaluation:   Behavioral-Environmental Outcomes: None identified  Food/Nutrient Intake Outcomes: Food and nutrient intake  Physical Signs/Symptoms Outcomes: Weight    Discharge Planning:    Continue current diet     Electronically signed by Freddy Mccarthy RD on 8/24/2021 at 9:56 AM    Contact: Ext [phone]

## 2021-08-24 NOTE — PROGRESS NOTES
General Daily Progress Note          Patient Name:   Marcie Trujillo       YOB: 1955       Age:  72 y.o. Admit Date: 8/19/2021      Subjective: On 4 L oxygen by nasal cannula           Objective:     Visit Vitals  BP (!) 149/78   Pulse 75   Temp 98.1 °F (36.7 °C)   Resp 18   Ht 5' 8\" (1.727 m)   Wt 141.1 kg (311 lb)   SpO2 98%   BMI 47.29 kg/m²        No results found for this or any previous visit (from the past 24 hour(s)). [unfilled]      Review of Systems    Constitutional: Negative for chills and fever. HENT: Negative. Eyes: Negative. Respiratory: Negative. Cardiovascular: Negative. Gastrointestinal: Negative for abdominal pain and nausea. Skin: Negative. Neurological: Negative. Physical Exam:      Constitutional: pt is oriented to person, place, and time. HENT:   Head: Normocephalic and atraumatic. Eyes: Pupils are equal, round, and reactive to light. EOM are normal.   Cardiovascular: Normal rate, regular rhythm and normal heart sounds. Pulmonary/Chest: Breath sounds normal. No wheezes. No rales. Exhibits no tenderness. Abdominal: Soft. Bowel sounds are normal. There is no abdominal tenderness. There is no rebound and no guarding. Musculoskeletal: Normal range of motion. Neurological: pt is alert and oriented to person, place, and time. XR CHEST PORT   Final Result   Bilateral pneumonia. CTA CHEST W OR W WO CONT   Final Result   Geographic groundglass opacities throughout the lungs. Consider   infectious/inflammatory process including viral etiologies. No CT evidence for PE. Cholelithiasis. XR CHEST PORT   Final Result   Bilateral peripheral infiltrates consistent with Covid pneumonia. No results found for this or any previous visit (from the past 24 hour(s)).     Results     Procedure Component Value Units Date/Time    CULTURE, BLOOD #2 [[id number]] Collected: 08/19/21 2128    Order Status: Completed Specimen: Blood Updated: 08/24/21 0945     Special Requests: No Special Requests        Culture result: No growth 4 days       CULTURE, BLOOD #1 [[id number]] Collected: 08/19/21 2125    Order Status: Completed Specimen: Blood Updated: 08/24/21 0945     Special Requests: No Special Requests        Culture result: No growth 4 days       CULTURE, BLOOD, PAIRED [[id number]] Collected: 08/19/21 1340    Order Status: Completed Specimen: Blood Updated: 08/24/21 0945     Special Requests: No Special Requests        Culture result: No growth 5 days              Labs:     Recent Labs     08/23/21  0755   WBC 10.5   HGB 13.6   HCT 41.9        Recent Labs     08/23/21 0755      K 3.7      CO2 30   BUN 16   CREA 0.75   *   CA 8.4*     Recent Labs     08/23/21 0755   ALT 36   AP 44*   TBILI 0.6   TP 6.3*   ALB 3.0*   GLOB 3.3     No results for input(s): INR, PTP, APTT, INREXT, INREXT in the last 72 hours. No results for input(s): FE, TIBC, PSAT, FERR in the last 72 hours. No results found for: FOL, RBCF   No results for input(s): PH, PCO2, PO2 in the last 72 hours. No results for input(s): CPK, CKNDX, TROIQ in the last 72 hours.     No lab exists for component: CPKMB  No results found for: CHOL, CHOLX, CHLST, CHOLV, HDL, HDLP, LDL, LDLC, DLDLP, TGLX, TRIGL, TRIGP, CHHD, CHHDX  No results found for: [facility]  Lab Results   Component Value Date/Time    Color Yellow 08/19/2021 04:05 PM    Appearance Turbid (A) 08/19/2021 04:05 PM    Specific gravity 1.019 08/19/2021 04:05 PM    pH (UA) 5.0 08/19/2021 04:05 PM    Protein 100 (A) 08/19/2021 04:05 PM    Glucose Negative 08/19/2021 04:05 PM    Ketone Negative 08/19/2021 04:05 PM    Bilirubin Negative 08/19/2021 04:05 PM    Urobilinogen 4.0 (H) 08/19/2021 04:05 PM    Nitrites Negative 08/19/2021 04:05 PM    Leukocyte Esterase Negative 08/19/2021 04:05 PM    Bacteria Negative 08/19/2021 04:05 PM    Bacteria Negative 08/19/2021 04:05 PM    WBC 0-4 08/19/2021 04:05 PM    WBC 0-4 08/19/2021 04:05 PM    RBC 0-5 08/19/2021 04:05 PM    RBC 0-5 08/19/2021 04:05 PM         Assessment:       Acute hypoxemic respiratory failure/on 4 L  COVID-19 pneumonitis  Hypertension    Plan:     Start on amlodipine 10 mg daily lisinopril 10 mg daily  Continue Zithromax IV Zosyn remdesivir and Decadron    Try to wean of oxygen for discharge planning    PT OT consult and respite therapy consult      Current Facility-Administered Medications:     amLODIPine (NORVASC) tablet 10 mg, 10 mg, Oral, DAILY, Anita De MD, 10 mg at 08/24/21 0857    lisinopriL (PRINIVIL, ZESTRIL) tablet 10 mg, 10 mg, Oral, DAILY, Gilbert Goins MD, 10 mg at 08/24/21 0858    melatonin tablet 3 mg, 3 mg, Oral, QHS, Brendan Contreras MD, 3 mg at 08/23/21 2226    benzonatate (TESSALON) capsule 200 mg, 200 mg, Oral, TID PRN, Dougie SWANSON MD    0.9% sodium chloride infusion, 25 mL/hr, IntraVENous, CONTINUOUS, Elis Higginbotham MD, Last Rate: 25 mL/hr at 08/22/21 0955, 25 mL/hr at 08/22/21 0955    acetaminophen (TYLENOL) tablet 650 mg, 650 mg, Oral, Q6H PRN, 650 mg at 08/20/21 0955 **OR** acetaminophen (TYLENOL) suppository 660 mg, 660 mg, Rectal, Q6H PRN, German De MD    polyethylene glycol (MIRALAX) packet 17 g, 17 g, Oral, DAILY PRN, Anita De MD    ondansetron (ZOFRAN ODT) tablet 4 mg, 4 mg, Oral, Q8H PRN, 4 mg at 08/20/21 0955 **OR** ondansetron (ZOFRAN) injection 4 mg, 4 mg, IntraVENous, Q6H PRN, Anita De MD    enoxaparin (LOVENOX) injection 40 mg, 40 mg, SubCUTAneous, DAILY, Anita De MD, 40 mg at 08/24/21 0859    dexamethasone (DECADRON) 4 mg/mL injection 4 mg, 4 mg, IntraVENous, Q6H, Anita De MD, 4 mg at 08/24/21 1119    azithromycin (ZITHROMAX) tablet 500 mg, 500 mg, Oral, DAILY, Anita De MD, 500 mg at 08/24/21 0858    piperacillin-tazobactam (ZOSYN) 3.375 g in 0.9% sodium chloride (MBP/ADV) 100 mL MBP, 3.375 g, IntraVENous, Q8H, Santino Katerine Corcoran MD, Last Rate: 25 mL/hr at 08/24/21 1119, 3.375 g at 08/24/21 1119    remdesivir 100 mg in 0.9% sodium chloride 250 mL IVPB, 100 mg, IntraVENous, Q24H, Anita De MD, Last Rate: 500 mL/hr at 08/23/21 2225, 100 mg at 08/23/21 2225

## 2021-08-24 NOTE — PROGRESS NOTES
PHYSICAL THERAPY EVALUATION  Patient: Ce Wood (89 y.o. female)  Date: 8/24/2021  Primary Diagnosis: Hypoxia [R09.02]  COVID-19 [U07.1]        Precautions: falls      ASSESSMENT  This is a 71 y/o female came to  [facility] ED  with c/o  shortness of breath, fever, malaise and admitted for hypoxia, COVID-19 on 8/19/2021. PMH includes: no pertinent PMH. Pt received sitting at the EOB  , agreeable for PT/OT eval/tx. Pt is A& O x 4 ,  per pt report , she lives with spouse in a single story home with 3 steps to enter , HR on bilateral sides ,  IND with ADL's and IND for functional transfers/mobility with no AD prior to admission . Based on the objective data described below, currently pt on 4.5 L O2 via nasal cannula ,  presents with balance deficits , generalized weakness , decreased activity tolerance and increased need for assist with functional mobility ( demonstrates intact static sitting balance ,  SUP for sit <>stand and SPT , good static  standing balance , is able to ambulate - 25' with no AD, SUP SPO2 levels dropped to 88-89% post ambulation , recovered back to 94% on rest  ) . Pt educated about energy conservation , verbalizes understanding . Pt is close to her functional baseline , would benefit from skilled PT services while at [facility] in order to increase safety and independence with functional transfers/mobility. Recommend d/c to home with HHPT when medically appropriate . Current Level of Function Impacting Discharge (mobility/balance): Requires SUP for transfers/mobility    Functional Outcome Measure: The patient scored 20 on the AM PAC basic mobility outcome measure which is indicative of low complexity.       Other factors to consider for discharge: low SPO2 levels , family support        PLAN :  Recommendations and Planned Interventions: transfer training, gait training, therapeutic exercises, neuromuscular re-education, patient and family training/education, and therapeutic activities Frequency/Duration: Patient will be followed by physical therapy:  2 times a week to address goals. Recommendation for discharge: (in order for the patient to meet his/her long term goals)  HHPT    This discharge recommendation:  Has been made in collaboration with the attending provider and/or case management    IF patient discharges home will need the following DME: none         SUBJECTIVE:   Patient stated I was not on oxygen at home     OBJECTIVE DATA SUMMARY:   HISTORY:    History reviewed. No pertinent past medical history. History reviewed. No pertinent surgical history. Personal factors and/or comorbidities impacting plan of care:     Home Situation  Home Environment: Private residence  # Steps to Enter: 3  Rails to Enter: Yes  Hand Rails : Bilateral  One/Two Story Residence: One story  Living Alone: No (lives with )  Support Systems: Spouse/Significant Other/Partner  Patient Expects to be Discharged to[de-identified] House  Current DME Used/Available at Home: None    PLOF: Pt IND for ADLS/IADLS, IND with mobility prior to admission. EXAMINATION/PRESENTATION/DECISION MAKING:   Critical Behavior:  Neurologic State: Alert  Orientation Level: Oriented X4  Cognition: Follows commands, Appropriate decision making  Safety/Judgement: Fall prevention, Home safety  Hearing: Auditory  Auditory Impairment: None  Skin:  intact where exposed    Range Of Motion:  AROM: Within functional limits    Strength:    Strength: Within functional limits    Tone & Sensation:   Tone: Normal    Sensation: Intact    Vision:   Corrective Lenses: Reading glasses  Functional Mobility:  Bed Mobility:    Scooting: Modified independent  Transfers:  Sit to Stand: Supervision  Stand to Sit: Supervision  Stand Pivot Transfers: Supervision        Balance:   Sitting: Intact  Standing: Impaired; Without support  Standing - Static: Good  Standing - Dynamic : Fair;Occasional  Ambulation/Gait Training:  Distance (ft): 25 Feet (ft)  Assistive Device: Gait belt  Ambulation - Level of Assistance: Supervision    Functional Measure:    [facility] [employer] AM-PAC 6 Clicks         Basic Mobility Inpatient Short Form  How much difficulty does the patient currently have. .. Unable A Lot A Little None   1. Turning over in bed (including adjusting bedclothes, sheets and blankets)? [] 1   [] 2   [] 3   [x] 4   2. Sitting down on and standing up from a chair with arms ( e.g., wheelchair, bedside commode, etc.)   [] 1   [] 2   [x] 3   [] 4   3. Moving from lying on back to sitting on the side of the bed? [] 1   [] 2   [] 3   [x] 4          How much help from another person does the patient currently need. .. Total A Lot A Little None   4. Moving to and from a bed to a chair (including a wheelchair)? [] 1   [] 2   [x] 3   [] 4   5. Need to walk in hospital room? [] 1   [] 2   [x] 3   [] 4   6. Climbing 3-5 steps with a railing? [] 1   [] 2   [x] 3   [] 4   © , Trustees of [facility] [employer], under license to [employer]. All rights reserved     Score:  Initial: 20 Most Recent: X (Date: -21- )   Interpretation of Tool:  Represents activities that are increasingly more difficult (i.e. Bed mobility, Transfers, Gait).   Score 24 23 22-20 19-15 14-10 9-7 6   Modifier CH CI CJ CK CL CM CN          Physical Therapy Evaluation Charge Determination   History Examination Presentation Decision-Making   LOW Complexity : Zero comorbidities / personal factors that will impact the outcome / POC LOW Complexity : 1-2 Standardized tests and measures addressing body structure, function, activity limitation and / or participation in recreation  MEDIUM Complexity : Evolving with changing characteristics  Other Functional Measure [facility] 6 medium complexity      Based on the above components, the patient evaluation is determined to be of the following complexity level: LOW     Pain Ratin/10    Activity Tolerance:   Fair, desaturates with exertion and requires oxygen, and observed SOB with activity  Please refer to the flowsheet for vital signs taken during this treatment. After treatment patient left in no apparent distress:   Call bell within reach and sitting at EOB with table in front    COMMUNICATION/EDUCATION:   The patients plan of care was discussed with: Occupational therapist.     Fall prevention education was provided and the patient/caregiver indicated understanding. and Patient/family agree to work toward stated goals and plan of care. PT/OT eval/tx occurred together for increased pt's safety and maximum functional outcome . PT goals   Pt will be I with LE HEP in 7 days. Pt will perform bed mobility independently in 7 days. Pt will perform transfers independently in 7 days. Pt will amb >100 feet with LRAD or no AD safely with mod I , SPO2>90% in 7 days.          Thank you for this referral.  Arielle Moon   Time Calculation: 19 mins

## 2021-08-24 NOTE — PROGRESS NOTES
PULMONARY NOTE  [facility]    Name: Ilya Street MRN: [id number]   : 1955 Hospital: [facility]   Date: 2021  Admission date: 2021 Hospital Day: 6       HPI:     Hospital Problems  Never Reviewed        Codes Class Noted POA    Hypoxia ICD-10-CM: R09.02  ICD-9-CM: 799.02  2021 Unknown        COVID-19 ICD-10-CM: U07.1  ICD-9-CM: 079.89  2021 Unknown                   [x] High complexity decision making was performed  [x] See my orders for details      Subjective/Initial History:     I was asked by Gerald Elliott MD to see Ilya Street  a 72 y.o.  female in consultation     Excerpts from admission 2021 or consult notes as follows:   Ms. Gerald Prince is a 73 yo F came to the ER d/t SOB, fever, fatigue and generalised weakness. Pt was admitted for COVID19 Pneumonitis -  CXR shows B/L peripheral infiltrates consistent w/ diagnosis. Pulmo consulted for Management.       -O2sat 92% on 4 L O2 nasal cannula pt says she feels well denies SOB does not mild throat discomfort may be d/t to post nasal drip overall in no acute distress       MAR reviewed and pertinent medications noted or modified as needed     Current Facility-Administered Medications   Medication    amLODIPine (NORVASC) tablet 10 mg    lisinopriL (PRINIVIL, ZESTRIL) tablet 10 mg    melatonin tablet 3 mg    benzonatate (TESSALON) capsule 200 mg    0.9% sodium chloride infusion    acetaminophen (TYLENOL) tablet 650 mg    Or    acetaminophen (TYLENOL) suppository 660 mg    polyethylene glycol (MIRALAX) packet 17 g    ondansetron (ZOFRAN ODT) tablet 4 mg    Or    ondansetron (ZOFRAN) injection 4 mg    enoxaparin (LOVENOX) injection 40 mg    dexamethasone (DECADRON) 4 mg/mL injection 4 mg    azithromycin (ZITHROMAX) tablet 500 mg    piperacillin-tazobactam (ZOSYN) 3.375 g in 0.9% sodium chloride (MBP/ADV) 100 mL MBP    remdesivir 100 mg in 0.9% sodium chloride 250 mL IVPB      Patient PCP: None  PMH:  has no past medical history on file. PSH:   has no past surgical history on file. FHX: family history is not on file. SHX:  reports that she has never smoked. She has never used smokeless tobacco. She reports previous alcohol use. She reports that she does not use drugs. ROS:    Review of Systems   Constitutional: Negative for malaise/fatigue. HENT: Positive for sore throat. Eyes: Negative. Respiratory: Positive for cough. Negative for sputum production and shortness of breath. Cardiovascular: Negative. Gastrointestinal: Negative. Genitourinary: Negative. Musculoskeletal: Negative. Skin: Negative. Neurological: Negative. Psychiatric/Behavioral: Negative. Objective:     Vital Signs: Telemetry:    normal sinus rhythm Intake/Output:   Visit Vitals  BP (!) 149/78 Comment: Simultaneous filing. User may not have seen previous data. Pulse 75 Comment: Simultaneous filing. User may not have seen previous data. Temp 98.1 °F (36.7 °C)   Resp 18   Ht 5' 8\" (1.727 m)   Wt 141.1 kg (311 lb)   SpO2 98%   BMI 47.29 kg/m²       Temp (24hrs), Av.6 °F (36.4 °C), Min:97.3 °F (36.3 °C), Max:98.1 °F (36.7 °C)        O2 Device: Nasal cannula O2 Flow Rate (L/min): 4 l/min       Wt Readings from Last 4 Encounters:   21 141.1 kg (311 lb)        No intake or output data in the 24 hours ending 21 1038    Last shift:      No intake/output data recorded. Last 3 shifts: No intake/output data recorded. Physical Exam:     Physical Exam  Constitutional:       Appearance: She is obese. She is ill-appearing. HENT:      Head: Normocephalic and atraumatic. Nose: Nose normal.      Mouth/Throat:      Mouth: Mucous membranes are moist.   Eyes:      Pupils: Pupils are equal, round, and reactive to light. Cardiovascular:      Rate and Rhythm: Normal rate and regular rhythm. Pulses: Normal pulses. Heart sounds: Normal heart sounds. Pulmonary:      Effort: Respiratory distress present. Breath sounds: No wheezing or rhonchi. Abdominal:      General: Abdomen is flat. Bowel sounds are normal.      Palpations: Abdomen is soft. Musculoskeletal:         General: Normal range of motion. Cervical back: Normal range of motion and neck supple. Skin:     General: Skin is warm. Neurological:      General: No focal deficit present. Mental Status: She is alert. Psychiatric:         Mood and Affect: Mood normal.          Labs:    Recent Labs     08/23/21  0755   WBC 10.5   HGB 13.6        Recent Labs     08/23/21  0755      K 3.7      CO2 30   *   BUN 16   CREA 0.75   CA 8.4*   ALB 3.0*   ALT 36     No results for input(s): PH, PCO2, PO2, HCO3, FIO2 in the last 72 hours. No results for input(s): CPK, CKNDX, TROIQ in the last 72 hours. No lab exists for component: CPKMB  No results found for: BNPP, BNP   Lab Results   Component Value Date/Time    Culture result: No growth 4 days 08/19/2021 09:28 PM    Culture result: No growth 4 days 08/19/2021 09:25 PM    Culture result: No growth 5 days 08/19/2021 01:40 PM   No results found for: TSH, TSHEXT, TSHEXT    Imaging:    CXR Results  (Last 48 hours)    None        Results from Hospital Encounter encounter on 08/19/21    XR CHEST PORT    Narrative  Chest one view. AP upright portable at 1009 dated 8/22/2021. Comparison 8/19/2021. Bilateral pneumonia is redemonstrated with increasing  opacity in the right upper lobe from before. There is no pleural fluid or  pneumothorax. The cardiomediastinal silhouette is stable. Impression  Bilateral pneumonia. XR CHEST PORT    Narrative  Portable upright radiograph of the chest 1:45 p.m. no prior study. INDICATION: Covid positive, hypoxia. Heart size is at the upper limits of normal with a tortuous aorta. Bilateral  peripheral infiltrates are noted consistent with history of Covid positive  status.  No effusion or pneumothorax. No acute bony findings. Impression  Bilateral peripheral infiltrates consistent with Covid pneumonia. Results from [place] encounter on 08/19/21    CTA CHEST W OR W WO CONT    Narrative  CTA chest.    Axial images are reviewed along with reformatted sagittal/coronal/MIP images. 100 mL Isovue 370 administered. Dose reduction: All CT scans at this facility are performed using dose reduction  optimization techniques as appropriate to a performed exam including the  following-  automated exposure control, adjustments of mA and/or Kv according to patient  size, or use of iterative reconstructive technique. Geographic groundglass opacities present throughout the lungs. Findings in  keeping with infectious/inflammatory process including viral etiologies. No  pleural effusion. Enhanced images demonstrate normal contrast opacification, normal caliber  pulmonary arterial trunk. Central pulmonary arterial branch vessels normally  opacify; no CT evidence for PE. Pulsatile artifact ascending thoracic aorta. Otherwise, thoracic aortic arch, great vessels traversing the superior  mediastinum, and ascending thoracic aorta normally opacify. Imaged content upper abdomen reveals 2 cm gallstone gallbladder lumen. Impression  Geographic groundglass opacities throughout the lungs. Consider  infectious/inflammatory process including viral etiologies. No CT evidence for PE. Cholelithiasis. IMPRESSION:   1. COVID Pneumonia -  8/19 CXR shows B/L peripheral infiltrates consistent w/dx COVID-19 pneumonia  2. Acute hypoxic respiratory failure   3. Rule out obstructive sleep apnea syndrome  Body mass index is 47.29 kg/m². 4. Pt is requiring Drug therapy requiring intensive monitoring for toxicity  5. Pt is stable but Prognosis guarded       RECOMMENDATIONS/PLAN:     1. Repeat COVID testing 8/23 positive for Novel COVID-19  2.  Pt was not vaccinated   3. on oxygen via nasal cannula 4L O2 sat 92%  4. Supplemental O2 to keep sats > 93%  5. Remdesivir 200 mg in 0.9% sodium chloride 250 mL  6. Zosyn, Decadron, Zithromax   7. She needs sleep study as an outpatient  8.  Chest x-ray still showed bilateral infiltrate consistent with COVID-19 pneumonia       ·         Davis Ruiz MD

## 2021-08-24 NOTE — PROGRESS NOTES
Report given to Lashaun Song RN oncoming nurse to include sbar, mar, kardex, recent orders and changes;

## 2021-08-25 LAB
BACTERIA SPEC CULT: NORMAL
SPECIAL REQUESTS,SREQ: NORMAL

## 2021-08-25 PROCEDURE — 74011250637 HC RX REV CODE- 250/637: Performed by: FAMILY MEDICINE

## 2021-08-25 PROCEDURE — 94760 N-INVAS EAR/PLS OXIMETRY 1: CPT

## 2021-08-25 PROCEDURE — 65270000029 HC RM PRIVATE

## 2021-08-25 PROCEDURE — 74011250636 HC RX REV CODE- 250/636: Performed by: FAMILY MEDICINE

## 2021-08-25 PROCEDURE — 74011000258 HC RX REV CODE- 258: Performed by: FAMILY MEDICINE

## 2021-08-25 PROCEDURE — 77010033678 HC OXYGEN DAILY

## 2021-08-25 RX ORDER — DEXAMETHASONE SODIUM PHOSPHATE 4 MG/ML
4 INJECTION, SOLUTION INTRA-ARTICULAR; INTRALESIONAL; INTRAMUSCULAR; INTRAVENOUS; SOFT TISSUE EVERY 12 HOURS
Status: DISCONTINUED | OUTPATIENT
Start: 2021-08-25 | End: 2021-08-26

## 2021-08-25 RX ADMIN — AMLODIPINE BESYLATE 10 MG: 5 TABLET ORAL at 10:02

## 2021-08-25 RX ADMIN — PIPERACILLIN SODIUM AND TAZOBACTAM SODIUM 3.38 G: 3; .375 INJECTION, POWDER, LYOPHILIZED, FOR SOLUTION INTRAVENOUS at 05:23

## 2021-08-25 RX ADMIN — DEXAMETHASONE SODIUM PHOSPHATE 4 MG: 4 INJECTION, SOLUTION INTRA-ARTICULAR; INTRALESIONAL; INTRAMUSCULAR; INTRAVENOUS; SOFT TISSUE at 05:23

## 2021-08-25 RX ADMIN — LISINOPRIL 10 MG: 10 TABLET ORAL at 10:02

## 2021-08-25 RX ADMIN — ENOXAPARIN SODIUM 40 MG: 40 INJECTION SUBCUTANEOUS at 10:02

## 2021-08-25 RX ADMIN — AZITHROMYCIN MONOHYDRATE 500 MG: 500 TABLET ORAL at 10:02

## 2021-08-25 NOTE — PROGRESS NOTES
PULMONARY NOTE  [facility]    Name: Lauren Galeana MRN: [id number]   : 1955 Hospital: [address] [place] [address]   Date: 2021  Admission date: 2021 Hospital Day: 7       HPI:     Hospital Problems  Never Reviewed        Codes Class Noted POA    Hypoxia ICD-10-CM: R09.02  ICD-9-CM: 799.02  2021 Unknown        COVID-19 ICD-10-CM: U07.1  ICD-9-CM: 079.89  2021 Unknown                   [x] High complexity decision making was performed  [x] See my orders for details      Subjective/Initial History:     I was asked by Pau Santamaria MD to see Lauren Galeana  a 72 y.o.  female in consultation     Excerpts from admission 2021 or consult notes as follows:   Ms. Vivek Arenas is a 73 yo F came to the ER d/t SOB, fever, fatigue and generalised weakness. Pt was admitted for COVID19 Pneumonitis -  CXR shows B/L peripheral infiltrates consistent w/ diagnosis. Pulmo consulted for Management.  -O2sat 91% on 4 L O2 nasal cannula pt says feels well denies SOB. Able to tolerate ambulation with PT yesterday.  No acute complaints at this time    [facility] reviewed and pertinent medications noted or modified as needed     Current Facility-Administered Medications   Medication    amLODIPine (NORVASC) tablet 10 mg    lisinopriL (PRINIVIL, ZESTRIL) tablet 10 mg    melatonin tablet 3 mg    benzonatate (TESSALON) capsule 200 mg    0.9% sodium chloride infusion    acetaminophen (TYLENOL) tablet 650 mg    Or    acetaminophen (TYLENOL) suppository 660 mg    polyethylene glycol (MIRALAX) packet 17 g    ondansetron (ZOFRAN ODT) tablet 4 mg    Or    ondansetron (ZOFRAN) injection 4 mg    enoxaparin (LOVENOX) injection 40 mg    dexamethasone (DECADRON) 4 mg/mL injection 4 mg    azithromycin (ZITHROMAX) tablet 500 mg    piperacillin-tazobactam (ZOSYN) 3.375 g in 0.9% sodium chloride (MBP/ADV) 100 mL MBP      Patient PCP: None  PMH:  has no past medical history on file.  PSH:   has no past surgical history on file. FHX: family history is not on file. SHX:  reports that she has never smoked. She has never used smokeless tobacco. She reports previous alcohol use. She reports that she does not use drugs. ROS:    Review of Systems   Constitutional: Negative for malaise/fatigue. HENT: Positive for congestion. Negative for sore throat. Eyes: Negative. Respiratory: Negative for cough, sputum production and shortness of breath. Cardiovascular: Negative. Gastrointestinal: Negative. Genitourinary: Negative. Musculoskeletal: Negative. Skin: Negative. Neurological: Negative. Psychiatric/Behavioral: Negative. Objective:     Vital Signs: Telemetry:    normal sinus rhythm Intake/Output:   Visit Vitals  BP (!) 143/78   Pulse 71   Temp 97.8 °F (36.6 °C)   Resp 18   Ht 5' 8\" (1.727 m)   Wt 311 lb (141.1 kg)   SpO2 92%   BMI 47.29 kg/m²       Temp (24hrs), Av.8 °F (36.6 °C), Min:97.4 °F (36.3 °C), Max:98.1 °F (36.7 °C)        O2 Device: Nasal cannula O2 Flow Rate (L/min): 4 l/min       Wt Readings from Last 4 Encounters:   21 311 lb (141.1 kg)        No intake or output data in the 24 hours ending 21 0616    Last shift:      No intake/output data recorded. Last 3 shifts: No intake/output data recorded. Physical Exam:     Physical Exam  Constitutional:       Appearance: She is obese. She is ill-appearing. HENT:      Head: Normocephalic and atraumatic. Nose: Nose normal.      Mouth/Throat:      Mouth: Mucous membranes are moist.   Eyes:      Pupils: Pupils are equal, round, and reactive to light. Cardiovascular:      Rate and Rhythm: Normal rate and regular rhythm. Pulses: Normal pulses. Heart sounds: Normal heart sounds. Pulmonary:      Effort: Respiratory distress present. Breath sounds: No wheezing or rhonchi. Chest:      Chest wall: No tenderness. Abdominal:      General: Abdomen is flat.  Bowel sounds are normal.      Palpations: Abdomen is soft. Musculoskeletal:         General: Normal range of motion. Cervical back: Normal range of motion and neck supple. Skin:     General: Skin is warm. Neurological:      General: No focal deficit present. Mental Status: She is alert. Psychiatric:         Mood and Affect: Mood normal.          Labs:    Recent Labs     08/23/21  0755   WBC 10.5   HGB 13.6        Recent Labs     08/23/21  0755      K 3.7      CO2 30   *   BUN 16   CREA 0.75   CA 8.4*   ALB 3.0*   ALT 36     No results for input(s): PH, PCO2, PO2, HCO3, FIO2 in the last 72 hours. No results for input(s): CPK, CKNDX, TROIQ in the last 72 hours. No lab exists for component: CPKMB  No results found for: BNPP, BNP   Lab Results   Component Value Date/Time    Culture result: No growth 4 days 08/19/2021 09:28 PM    Culture result: No growth 4 days 08/19/2021 09:25 PM    Culture result: No growth 5 days 08/19/2021 01:40 PM   No results found for: TSH, TSHEXT, TSHEXT    Imaging:    CXR Results  (Last 48 hours)    None        Results from Hospital Encounter encounter on 08/19/21    XR CHEST PORT    Narrative  Chest one view. AP upright portable at 1009 dated 8/22/2021. Comparison 8/19/2021. Bilateral pneumonia is redemonstrated with increasing  opacity in the right upper lobe from before. There is no pleural fluid or  pneumothorax. The cardiomediastinal silhouette is stable. Impression  Bilateral pneumonia. XR CHEST PORT    Narrative  Portable upright radiograph of the chest 1:45 p.m. no prior study. INDICATION: Covid positive, hypoxia. Heart size is at the upper limits of normal with a tortuous aorta. Bilateral  peripheral infiltrates are noted consistent with history of Covid positive  status. No effusion or pneumothorax. No acute bony findings. Impression  Bilateral peripheral infiltrates consistent with Covid pneumonia.     Results from Hospital Encounter encounter on 08/19/21    CTA CHEST W OR W WO CONT    Narrative  CTA chest.    Axial images are reviewed along with reformatted sagittal/coronal/MIP images. 100 mL Isovue 370 administered. Dose reduction: All CT scans at this facility are performed using dose reduction  optimization techniques as appropriate to a performed exam including the  following-  automated exposure control, adjustments of mA and/or Kv according to patient  size, or use of iterative reconstructive technique. Geographic groundglass opacities present throughout the lungs. Findings in  keeping with infectious/inflammatory process including viral etiologies. No  pleural effusion. Enhanced images demonstrate normal contrast opacification, normal caliber  pulmonary arterial trunk. Central pulmonary arterial branch vessels normally  opacify; no CT evidence for PE. Pulsatile artifact ascending thoracic aorta. Otherwise, thoracic aortic arch, great vessels traversing the superior  mediastinum, and ascending thoracic aorta normally opacify. Imaged content upper abdomen reveals 2 cm gallstone gallbladder lumen. Impression  Geographic groundglass opacities throughout the lungs. Consider  infectious/inflammatory process including viral etiologies. No CT evidence for PE. Cholelithiasis. IMPRESSION:   1. COVID Pneumonia -  8/19 CXR shows B/L peripheral infiltrates consistent w/dx COVID-19 pneumonia  2. Acute hypoxic respiratory failure   3. Rule out obstructive sleep apnea syndrome  Body mass index is 47.29 kg/m². 4. Pt is requiring Drug therapy requiring intensive monitoring for toxicity  5. Pt is stable but Prognosis guarded       RECOMMENDATIONS/PLAN:     1. Pt was not vaccinated   2. O2sat 91% on 4 L O2 nasal cannula   3. Supplemental O2 to keep sats > 93%  4. Remdesivir 200 mg in 0.9% sodium chloride 250 mL  5. Zosyn, Decadron, Zithromax   6. She needs sleep study as an outpatient  7.  Chest x-ray still showed bilateral infiltrate consistent with COVID-19 pneumonia             Valerie Graves

## 2021-08-25 NOTE — PROGRESS NOTES
O2 SAT 90% ON 2 LPM    PATIENT SITTING AT BEDSIDE, HAVING A CONVERSATION, WITH NO NOTICEABLE SHORTNESS OF BREATH AT THIS TIME

## 2021-08-25 NOTE — PROGRESS NOTES
Spiritual Care Assessment/Progress Note  [facility]      NAME: Michel Sellers      MRN: [id number]  AGE: 72 y.o.  SEX: female  Jainism Affiliation: Judaism   Language: English     8/25/2021     Total Time (in minutes): 25     Spiritual Assessment begun in [address] through conversation with:         [x]Patient        [] Family    [] Friend(s)        Reason for Consult: Initial/Spiritual assessment, patient floor     Spiritual beliefs: (Please include comment if needed)     [x] Identifies with a gianfranco tradition:         [] Supported by a gianfranco community:            [] Claims no spiritual orientation:           [] Seeking spiritual identity:                [] Adheres to an individual form of spirituality:           [] Not able to assess:                           Identified resources for coping:      [x] Prayer                               [] Music                  [] Guided Imagery     [x] Family/friends                 [] Pet visits     [x] Devotional reading                         [] Unknown     [] Other:                                               Interventions offered during this visit: (See comments for more details)    Patient Interventions: Affirmation of gianfranco, Affirmation of emotions/emotional suffering, Catharsis/review of pertinent events in supportive environment, Coping skills reviewed/reinforced, Iconic (affirming the presence of God/Higher Power), Initial/Spiritual assessment, patient floor, Prayer (assurance of), Jainism beliefs/image of God discussed           Plan of Care:     [] Support spiritual and/or cultural needs    [] Support AMD and/or advance care planning process      [] Support grieving process   [] Coordinate Rites and/or Rituals    [] Coordination with community clergy   [] No spiritual needs identified at this time   [] Detailed Plan of Care below (See Comments)  [] Make referral to Music Therapy  [] Make referral to Pet Therapy     [] Make referral to Addiction services  [] Make referral to [facility]  [] Make referral to Spiritual Care Partner  [] No future visits requested        [x] Follow up upon further referrals     Comments: The purpose of the visit was to do a spiritual assessment on the patient. The patient mentioned that her levels are getting better, so she is hopeful that she will make a full recovery. She shared that she is encouraged by receiving daily devotionals on her phone that inspire her. She mentioned that her family is very supportive and loving, yet there were moments where doubt attempted to overtake her spirit. She expressed that she trust God in all that she is going through. She shared that even in her room that God presence is with here. She shared she is trusting God to see her through this illness. The  listened empathically and reflectively. The  also facilitated meaningful story-telling from the patient. [facility] Katie Chavez.    can be reached by calling the  at [facility]  [phone]

## 2021-08-25 NOTE — PROGRESS NOTES
General Daily Progress Note          Patient Name:   Surinder Phillips       YOB: 1955       Age:  72 y.o. Admit Date: 8/19/2021      Subjective: On 4 L oxygen by nasal cannula           Objective:     Visit Vitals  BP (!) 158/69 (BP 1 Location: Left lower arm, BP Patient Position: At rest)   Pulse 75   Temp 97.4 °F (36.3 °C)   Resp 16   Ht 5' 8\" (1.727 m)   Wt 141.1 kg (311 lb)   SpO2 90%   BMI 47.29 kg/m²        No results found for this or any previous visit (from the past 24 hour(s)). [unfilled]      Review of Systems    Constitutional: Negative for chills and fever. HENT: Negative. Eyes: Negative. Respiratory: Negative. Cardiovascular: Negative. Gastrointestinal: Negative for abdominal pain and nausea. Skin: Negative. Neurological: Negative. Physical Exam:      Constitutional: pt is oriented to person, place, and time. HENT:   Head: Normocephalic and atraumatic. Eyes: Pupils are equal, round, and reactive to light. EOM are normal.   Cardiovascular: Normal rate, regular rhythm and normal heart sounds. Pulmonary/Chest: Breath sounds normal. No wheezes. No rales. Exhibits no tenderness. Abdominal: Soft. Bowel sounds are normal. There is no abdominal tenderness. There is no rebound and no guarding. Musculoskeletal: Normal range of motion. Neurological: pt is alert and oriented to person, place, and time. XR CHEST PORT   Final Result   Bilateral pneumonia. CTA CHEST W OR W WO CONT   Final Result   Geographic groundglass opacities throughout the lungs. Consider   infectious/inflammatory process including viral etiologies. No CT evidence for PE. Cholelithiasis. XR CHEST PORT   Final Result   Bilateral peripheral infiltrates consistent with Covid pneumonia. No results found for this or any previous visit (from the past 24 hour(s)).     Results     Procedure Component Value Units Date/Time    CULTURE, BLOOD #2 [[id number]] Collected: 08/19/21 2128    Order Status: Completed Specimen: Blood Updated: 08/25/21 0919     Special Requests: No Special Requests        Culture result: No growth 5 days       CULTURE, BLOOD #1 [[id number]] Collected: 08/19/21 2125    Order Status: Completed Specimen: Blood Updated: 08/25/21 0919     Special Requests: No Special Requests        Culture result: No growth 5 days       CULTURE, BLOOD, PAIRED [[id number]] Collected: 08/19/21 1340    Order Status: Completed Specimen: Blood Updated: 08/25/21 0919     Special Requests: No Special Requests        Culture result: No growth 6 days              Labs:     Recent Labs     08/23/21  0755   WBC 10.5   HGB 13.6   HCT 41.9        Recent Labs     08/23/21  0755      K 3.7      CO2 30   BUN 16   CREA 0.75   *   CA 8.4*     Recent Labs     08/23/21 0755   ALT 36   AP 44*   TBILI 0.6   TP 6.3*   ALB 3.0*   GLOB 3.3     No results for input(s): INR, PTP, APTT, INREXT, INREXT in the last 72 hours. No results for input(s): FE, TIBC, PSAT, FERR in the last 72 hours. No results found for: FOL, RBCF   No results for input(s): PH, PCO2, PO2 in the last 72 hours. No results for input(s): CPK, CKNDX, TROIQ in the last 72 hours.     No lab exists for component: CPKMB  No results found for: CHOL, CHOLX, CHLST, CHOLV, HDL, HDLP, LDL, LDLC, DLDLP, TGLX, TRIGL, TRIGP, CHHD, CHHDX  No results found for: [facility]  Lab Results   Component Value Date/Time    Color Yellow 08/19/2021 04:05 PM    Appearance Turbid (A) 08/19/2021 04:05 PM    Specific gravity 1.019 08/19/2021 04:05 PM    pH (UA) 5.0 08/19/2021 04:05 PM    Protein 100 (A) 08/19/2021 04:05 PM    Glucose Negative 08/19/2021 04:05 PM    Ketone Negative 08/19/2021 04:05 PM    Bilirubin Negative 08/19/2021 04:05 PM    Urobilinogen 4.0 (H) 08/19/2021 04:05 PM    Nitrites Negative 08/19/2021 04:05 PM    Leukocyte Esterase Negative 08/19/2021 04:05 PM    Bacteria Negative 08/19/2021 04:05 PM Bacteria Negative 08/19/2021 04:05 PM    WBC 0-4 08/19/2021 04:05 PM    WBC 0-4 08/19/2021 04:05 PM    RBC 0-5 08/19/2021 04:05 PM    RBC 0-5 08/19/2021 04:05 PM         Assessment:       Acute hypoxemic respiratory failure/on 4 L  COVID-19 pneumonitis  Hypertension    Plan:     Start on amlodipine 10 mg daily lisinopril 10 mg daily  Continue Zithromax IV Zosyn remdesivir and Decadron    Try to wean of oxygen for discharge planning      Possible discharge home tomorrow on oxygen 2 L      Current Facility-Administered Medications:     dexamethasone (DECADRON) 4 mg/mL injection 4 mg, 4 mg, IntraVENous, Q12H, Baltazar Higginbotham MD    amLODIPine (NORVASC) tablet 10 mg, 10 mg, Oral, DAILY, Anita De MD, 10 mg at 08/25/21 1002    lisinopriL (PRINIVIL, ZESTRIL) tablet 10 mg, 10 mg, Oral, DAILY, Anita De MD, 10 mg at 08/25/21 1002    melatonin tablet 3 mg, 3 mg, Oral, QHS, Brendan Contreras MD, 3 mg at 08/24/21 2237    benzonatate (TESSALON) capsule 200 mg, 200 mg, Oral, TID PRN, Queen Roderick SWANSON MD    0.9% sodium chloride infusion, 25 mL/hr, IntraVENous, CONTINUOUS, Baltazar Higginbotham MD, Last Rate: 25 mL/hr at 08/22/21 0955, 25 mL/hr at 08/22/21 0955    acetaminophen (TYLENOL) tablet 650 mg, 650 mg, Oral, Q6H PRN, 650 mg at 08/20/21 0955 **OR** acetaminophen (TYLENOL) suppository 660 mg, 660 mg, Rectal, Q6H PRN, Anita De MD    polyethylene glycol (MIRALAX) packet 17 g, 17 g, Oral, DAILY PRN, Anita De MD    ondansetron (ZOFRAN ODT) tablet 4 mg, 4 mg, Oral, Q8H PRN, 4 mg at 08/20/21 0955 **OR** ondansetron (ZOFRAN) injection 4 mg, 4 mg, IntraVENous, Q6H PRN, Anita De MD    enoxaparin (LOVENOX) injection 40 mg, 40 mg, SubCUTAneous, DAILY, Anita De MD, 40 mg at 08/25/21 1002    azithromycin (ZITHROMAX) tablet 500 mg, 500 mg, Oral, DAILY, Anita De MD, 500 mg at 08/25/21 1002

## 2021-08-25 NOTE — PROGRESS NOTES
PULMONARY NOTE  [facility]    Name: Paulie Adams MRN: [id number]   : 1955 Hospital: [address] [place] [address]   Date: 2021  Admission date: 2021 Hospital Day: 7       HPI:     Hospital Problems  Never Reviewed        Codes Class Noted POA    Hypoxia ICD-10-CM: R09.02  ICD-9-CM: 799.02  2021 Unknown        COVID-19 ICD-10-CM: U07.1  ICD-9-CM: 079.89  2021 Unknown                   [x] High complexity decision making was performed  [x] See my orders for details      Subjective/Initial History:     I was asked by Kitty Wilkins MD to see Paulie Adams  a 72 y.o.  female in consultation     Excerpts from admission 2021 or consult notes as follows:   Ms. Jill Tabares is a 73 yo F came to the ER d/t SOB, fever, fatigue and generalised weakness. Pt was admitted for COVID19 Pneumonitis -  CXR shows B/L peripheral infiltrates consistent w/ diagnosis. Pulmo consulted for Management.  -O2sat 91% on 4 L O2 nasal cannula pt says feels well denies SOB. Able to tolerate ambulation with PT yesterday.  No acute complaints at this time    [facility] reviewed and pertinent medications noted or modified as needed     Current Facility-Administered Medications   Medication    amLODIPine (NORVASC) tablet 10 mg    lisinopriL (PRINIVIL, ZESTRIL) tablet 10 mg    melatonin tablet 3 mg    benzonatate (TESSALON) capsule 200 mg    0.9% sodium chloride infusion    acetaminophen (TYLENOL) tablet 650 mg    Or    acetaminophen (TYLENOL) suppository 660 mg    polyethylene glycol (MIRALAX) packet 17 g    ondansetron (ZOFRAN ODT) tablet 4 mg    Or    ondansetron (ZOFRAN) injection 4 mg    enoxaparin (LOVENOX) injection 40 mg    dexamethasone (DECADRON) 4 mg/mL injection 4 mg    azithromycin (ZITHROMAX) tablet 500 mg    piperacillin-tazobactam (ZOSYN) 3.375 g in 0.9% sodium chloride (MBP/ADV) 100 mL MBP      Patient PCP: None  PMH:  has no past medical history on file.  PSH:   has no past surgical history on file. FHX: family history is not on file. SHX:  reports that she has never smoked. She has never used smokeless tobacco. She reports previous alcohol use. She reports that she does not use drugs. ROS:    Review of Systems   Constitutional: Negative for malaise/fatigue. HENT: Positive for congestion. Negative for sore throat. Eyes: Negative. Respiratory: Negative for cough, sputum production and shortness of breath. Cardiovascular: Negative. Gastrointestinal: Negative. Genitourinary: Negative. Musculoskeletal: Negative. Skin: Negative. Neurological: Negative. Psychiatric/Behavioral: Negative. Objective:     Vital Signs: Telemetry:    normal sinus rhythm Intake/Output:   Visit Vitals  BP (!) 158/69 (BP 1 Location: Left lower arm, BP Patient Position: At rest)   Pulse 75   Temp 97.4 °F (36.3 °C)   Resp 16   Ht 5' 8\" (1.727 m)   Wt 141.1 kg (311 lb)   SpO2 90%   BMI 47.29 kg/m²       Temp (24hrs), Av.5 °F (36.4 °C), Min:97.4 °F (36.3 °C), Max:97.8 °F (36.6 °C)        O2 Device: Nasal cannula O2 Flow Rate (L/min): 2 l/min       Wt Readings from Last 4 Encounters:   21 141.1 kg (311 lb)        No intake or output data in the 24 hours ending 21 0957    Last shift:      No intake/output data recorded. Last 3 shifts: No intake/output data recorded. Physical Exam:     Physical Exam  Constitutional:       Appearance: She is obese. She is ill-appearing. HENT:      Head: Normocephalic and atraumatic. Nose: Nose normal.      Mouth/Throat:      Mouth: Mucous membranes are moist.   Eyes:      Pupils: Pupils are equal, round, and reactive to light. Cardiovascular:      Rate and Rhythm: Normal rate and regular rhythm. Pulses: Normal pulses. Heart sounds: Normal heart sounds. Pulmonary:      Effort: Respiratory distress present. Breath sounds: No wheezing or rhonchi.    Chest:      Chest wall: No tenderness. Abdominal:      General: Abdomen is flat. Bowel sounds are normal.      Palpations: Abdomen is soft. Musculoskeletal:         General: Normal range of motion. Cervical back: Normal range of motion and neck supple. Skin:     General: Skin is warm. Neurological:      General: No focal deficit present. Mental Status: She is alert. Psychiatric:         Mood and Affect: Mood normal.          Labs:    Recent Labs     08/23/21  0755   WBC 10.5   HGB 13.6        Recent Labs     08/23/21  0755      K 3.7      CO2 30   *   BUN 16   CREA 0.75   CA 8.4*   ALB 3.0*   ALT 36     No results for input(s): PH, PCO2, PO2, HCO3, FIO2 in the last 72 hours. No results for input(s): CPK, CKNDX, TROIQ in the last 72 hours. No lab exists for component: CPKMB  No results found for: BNPP, BNP   Lab Results   Component Value Date/Time    Culture result: No growth 5 days 08/19/2021 09:28 PM    Culture result: No growth 5 days 08/19/2021 09:25 PM    Culture result: No growth 6 days 08/19/2021 01:40 PM   No results found for: TSH, TSHEXT, TSHEXT    Imaging:    CXR Results  (Last 48 hours)    None        Results from Hospital Encounter encounter on 08/19/21    XR CHEST PORT    Narrative  Chest one view. AP upright portable at 1009 dated 8/22/2021. Comparison 8/19/2021. Bilateral pneumonia is redemonstrated with increasing  opacity in the right upper lobe from before. There is no pleural fluid or  pneumothorax. The cardiomediastinal silhouette is stable. Impression  Bilateral pneumonia. XR CHEST PORT    Narrative  Portable upright radiograph of the chest 1:45 p.m. no prior study. INDICATION: Covid positive, hypoxia. Heart size is at the upper limits of normal with a tortuous aorta. Bilateral  peripheral infiltrates are noted consistent with history of Covid positive  status. No effusion or pneumothorax. No acute bony findings.     Impression  Bilateral peripheral infiltrates consistent with Covid pneumonia. Results from [place] encounter on 08/19/21    CTA CHEST W OR W WO CONT    Narrative  CTA chest.    Axial images are reviewed along with reformatted sagittal/coronal/MIP images. 100 mL Isovue 370 administered. Dose reduction: All CT scans at this facility are performed using dose reduction  optimization techniques as appropriate to a performed exam including the  following-  automated exposure control, adjustments of mA and/or Kv according to patient  size, or use of iterative reconstructive technique. Geographic groundglass opacities present throughout the lungs. Findings in  keeping with infectious/inflammatory process including viral etiologies. No  pleural effusion. Enhanced images demonstrate normal contrast opacification, normal caliber  pulmonary arterial trunk. Central pulmonary arterial branch vessels normally  opacify; no CT evidence for PE. Pulsatile artifact ascending thoracic aorta. Otherwise, thoracic aortic arch, great vessels traversing the superior  mediastinum, and ascending thoracic aorta normally opacify. Imaged content upper abdomen reveals 2 cm gallstone gallbladder lumen. Impression  Geographic groundglass opacities throughout the lungs. Consider  infectious/inflammatory process including viral etiologies. No CT evidence for PE. Cholelithiasis. IMPRESSION:   1. COVID Pneumonia -  8/19 CXR shows B/L peripheral infiltrates consistent w/dx COVID-19 pneumonia  2. Acute hypoxic respiratory failure   3. Rule out obstructive sleep apnea syndrome  Body mass index is 47.29 kg/m². RECOMMENDATIONS/PLAN:     1. Pt was not vaccinated   2. O2sat 91% on 4 L O2 nasal cannula   3. Supplemental O2 to keep sats > 93%  4. She finished remdesivir will discontinue Zosyn  5. Zosyn, Decadron, Zithromax   6. She needs sleep study as an outpatient  7.  Chest x-ray still showed bilateral infiltrate consistent with COVID-19 pneumonia             Court Maldonado MD

## 2021-08-26 VITALS
OXYGEN SATURATION: 92 % | HEIGHT: 68 IN | WEIGHT: 293 LBS | BODY MASS INDEX: 44.41 KG/M2 | SYSTOLIC BLOOD PRESSURE: 126 MMHG | DIASTOLIC BLOOD PRESSURE: 66 MMHG | RESPIRATION RATE: 18 BRPM | HEART RATE: 75 BPM | TEMPERATURE: 97.5 F

## 2021-08-26 LAB
BACTERIA SPEC CULT: NORMAL
BACTERIA SPEC CULT: NORMAL
SPECIAL REQUESTS,SREQ: NORMAL
SPECIAL REQUESTS,SREQ: NORMAL

## 2021-08-26 PROCEDURE — 94762 N-INVAS EAR/PLS OXIMTRY CONT: CPT

## 2021-08-26 PROCEDURE — 77010033678 HC OXYGEN DAILY

## 2021-08-26 PROCEDURE — 74011250637 HC RX REV CODE- 250/637: Performed by: FAMILY MEDICINE

## 2021-08-26 PROCEDURE — 74011250637 HC RX REV CODE- 250/637: Performed by: INTERNAL MEDICINE

## 2021-08-26 PROCEDURE — 74011250636 HC RX REV CODE- 250/636: Performed by: INTERNAL MEDICINE

## 2021-08-26 RX ORDER — BENZONATATE 200 MG/1
200 CAPSULE ORAL
Qty: 20 CAPSULE | Refills: 0 | Status: SHIPPED | OUTPATIENT
Start: 2021-08-26 | End: 2021-09-02

## 2021-08-26 RX ORDER — AMLODIPINE BESYLATE 10 MG/1
10 TABLET ORAL DAILY
Qty: 30 TABLET | Refills: 0 | Status: SHIPPED | OUTPATIENT
Start: 2021-08-27 | End: 2021-10-27

## 2021-08-26 RX ORDER — AZITHROMYCIN 500 MG/1
500 TABLET, FILM COATED ORAL DAILY
Qty: 3 TABLET | Refills: 0 | OUTPATIENT
Start: 2021-08-27 | End: 2021-09-25

## 2021-08-26 RX ORDER — DEXAMETHASONE 4 MG/1
4 TABLET ORAL EVERY 12 HOURS
Status: DISCONTINUED | OUTPATIENT
Start: 2021-08-26 | End: 2021-08-26 | Stop reason: HOSPADM

## 2021-08-26 RX ORDER — FUROSEMIDE 40 MG/1
20 TABLET ORAL DAILY
Status: DISCONTINUED | OUTPATIENT
Start: 2021-08-27 | End: 2021-08-26

## 2021-08-26 RX ORDER — LANOLIN ALCOHOL/MO/W.PET/CERES
3 CREAM (GRAM) TOPICAL
Qty: 30 TABLET | Refills: 0 | Status: SHIPPED | OUTPATIENT
Start: 2021-08-26 | End: 2021-10-27

## 2021-08-26 RX ORDER — LISINOPRIL 10 MG/1
10 TABLET ORAL DAILY
Qty: 30 TABLET | Refills: 0 | Status: SHIPPED | OUTPATIENT
Start: 2021-08-27 | End: 2021-10-27

## 2021-08-26 RX ORDER — DEXAMETHASONE 4 MG/1
TABLET ORAL
Qty: 30 TABLET | Refills: 0 | OUTPATIENT
Start: 2021-08-26 | End: 2021-09-25

## 2021-08-26 RX ADMIN — DEXAMETHASONE 4 MG: 4 TABLET ORAL at 13:15

## 2021-08-26 RX ADMIN — LISINOPRIL 10 MG: 10 TABLET ORAL at 10:48

## 2021-08-26 RX ADMIN — AMLODIPINE BESYLATE 10 MG: 5 TABLET ORAL at 10:48

## 2021-08-26 RX ADMIN — MELATONIN TAB 3 MG 3 MG: 3 TAB at 00:52

## 2021-08-26 RX ADMIN — DEXAMETHASONE SODIUM PHOSPHATE 4 MG: 4 INJECTION, SOLUTION INTRA-ARTICULAR; INTRALESIONAL; INTRAMUSCULAR; INTRAVENOUS; SOFT TISSUE at 00:52

## 2021-08-26 RX ADMIN — AZITHROMYCIN MONOHYDRATE 500 MG: 500 TABLET ORAL at 10:48

## 2021-08-26 NOTE — PROGRESS NOTES
PULMONARY NOTE  [facility]    Name: Lovely Killian MRN: [id number]   : 1955 Hospital: [facility]   Date: 2021  Admission date: 2021 Hospital Day: 8       HPI:     Hospital Problems  Never Reviewed        Codes Class Noted POA    Hypoxia ICD-10-CM: R09.02  ICD-9-CM: 799.02  2021 Unknown        COVID-19 ICD-10-CM: U07.1  ICD-9-CM: 079.89  2021 Unknown                   [x] High complexity decision making was performed  [x] See my orders for details      Subjective/Initial History:     I was asked by Madeline Felix MD to see Lovely Killian  a 72 y.o.  female in consultation     Excerpts from admission 2021 or consult notes as follows:   Ms. Evita Burger is a 71 yo F came to the ER d/t SOB, fever, fatigue and generalised weakness. Pt was admitted for COVID19 Pneumonitis -  CXR shows B/L peripheral infiltrates consistent w/ diagnosis. Pulmo consulted for Management.  - Pt was seen up and alert in room sitting on the side of the bed, denies SOB, no acute complaints at this time  O2sat 93% on room air was on 1l/min nasal cannula      MAR reviewed and pertinent medications noted or modified as needed     Current Facility-Administered Medications   Medication    dexamethasone (DECADRON) 4 mg/mL injection 4 mg    amLODIPine (NORVASC) tablet 10 mg    lisinopriL (PRINIVIL, ZESTRIL) tablet 10 mg    melatonin tablet 3 mg    benzonatate (TESSALON) capsule 200 mg    0.9% sodium chloride infusion    acetaminophen (TYLENOL) tablet 650 mg    Or    acetaminophen (TYLENOL) suppository 660 mg    polyethylene glycol (MIRALAX) packet 17 g    ondansetron (ZOFRAN ODT) tablet 4 mg    Or    ondansetron (ZOFRAN) injection 4 mg    enoxaparin (LOVENOX) injection 40 mg    azithromycin (ZITHROMAX) tablet 500 mg      Patient PCP: None  PMH:  has no past medical history on file. PSH:   has no past surgical history on file.    FHX: family history is not on file. SHX:  reports that she has never smoked. She has never used smokeless tobacco. She reports previous alcohol use. She reports that she does not use drugs. ROS:    Review of Systems   Constitutional: Negative for malaise/fatigue. HENT: Positive for congestion. Negative for sore throat. Eyes: Negative. Respiratory: Negative for cough, sputum production and shortness of breath. Cardiovascular: Negative. Gastrointestinal: Negative. Genitourinary: Negative. Musculoskeletal: Negative. Skin: Negative. Neurological: Negative. Psychiatric/Behavioral: Negative. All other systems reviewed and are negative. Objective:     Vital Signs: Telemetry:    normal sinus rhythm Intake/Output:   Visit Vitals  /88 (BP 1 Location: Left upper arm, BP Patient Position: At rest)   Pulse 76   Temp 98.4 °F (36.9 °C)   Resp 18   Ht 5' 8\" (1.727 m)   Wt 141.1 kg (311 lb)   SpO2 91%   BMI 47.29 kg/m²       Temp (24hrs), Av.9 °F (36.6 °C), Min:97.6 °F (36.4 °C), Max:98.4 °F (36.9 °C)        O2 Device: None (Room air) O2 Flow Rate (L/min): 1 l/min       Wt Readings from Last 4 Encounters:   21 141.1 kg (311 lb)        No intake or output data in the 24 hours ending 21 1036    Last shift:      No intake/output data recorded. Last 3 shifts: No intake/output data recorded. Physical Exam:     Physical Exam  Constitutional:       Appearance: Normal appearance. She is obese. She is not ill-appearing. HENT:      Head: Normocephalic and atraumatic. Nose: Nose normal.      Mouth/Throat:      Mouth: Mucous membranes are moist.   Eyes:      Pupils: Pupils are equal, round, and reactive to light. Cardiovascular:      Rate and Rhythm: Normal rate and regular rhythm. Pulses: Normal pulses. Heart sounds: Normal heart sounds. Pulmonary:      Effort: No respiratory distress. Breath sounds: No wheezing or rhonchi. Chest:      Chest wall: No tenderness. Abdominal:      General: Abdomen is flat. Bowel sounds are normal.      Palpations: Abdomen is soft. Musculoskeletal:         General: Normal range of motion. Cervical back: Normal range of motion and neck supple. Skin:     General: Skin is warm. Neurological:      General: No focal deficit present. Mental Status: She is alert. Psychiatric:         Mood and Affect: Mood normal.          Labs:    No results for input(s): WBC, HGB, PLT, INR, APTT, HGBEXT, PLTEXT, INREXT, HGBEXT, PLTEXT, INREXT in the last 72 hours. No lab exists for component: FIB, DDMER  No results for input(s): NA, K, CL, CO2, GLU, BUN, CREA, CA, MG, PHOS, LAC, ALB, TBIL, ALT, AML, LPSE in the last 72 hours. No lab exists for component: SGOT  No results for input(s): PH, PCO2, PO2, HCO3, FIO2 in the last 72 hours. No results for input(s): CPK, CKNDX, TROIQ in the last 72 hours. No lab exists for component: CPKMB  No results found for: BNPP, BNP   Lab Results   Component Value Date/Time    Culture result: No growth 6 days 08/19/2021 09:28 PM    Culture result: No growth 6 days 08/19/2021 09:25 PM    Culture result: No growth 6 days 08/19/2021 01:40 PM   No results found for: TSH, TSHEXT, TSHEXT    Imaging:    CXR Results  (Last 48 hours)    None        Results from Hospital Encounter encounter on 08/19/21    XR CHEST PORT    Narrative  Chest one view. AP upright portable at 1009 dated 8/22/2021. Comparison 8/19/2021. Bilateral pneumonia is redemonstrated with increasing  opacity in the right upper lobe from before. There is no pleural fluid or  pneumothorax. The cardiomediastinal silhouette is stable. Impression  Bilateral pneumonia. XR CHEST PORT    Narrative  Portable upright radiograph of the chest 1:45 p.m. no prior study. INDICATION: Covid positive, hypoxia. Heart size is at the upper limits of normal with a tortuous aorta.  Bilateral  peripheral infiltrates are noted consistent with history of Covid positive  status. No effusion or pneumothorax. No acute bony findings. Impression  Bilateral peripheral infiltrates consistent with Covid pneumonia. Results from [place] encounter on 08/19/21    CTA CHEST W OR W WO CONT    Narrative  CTA chest.    Axial images are reviewed along with reformatted sagittal/coronal/MIP images. 100 mL Isovue 370 administered. Dose reduction: All CT scans at this facility are performed using dose reduction  optimization techniques as appropriate to a performed exam including the  following-  automated exposure control, adjustments of mA and/or Kv according to patient  size, or use of iterative reconstructive technique. Geographic groundglass opacities present throughout the lungs. Findings in  keeping with infectious/inflammatory process including viral etiologies. No  pleural effusion. Enhanced images demonstrate normal contrast opacification, normal caliber  pulmonary arterial trunk. Central pulmonary arterial branch vessels normally  opacify; no CT evidence for PE. Pulsatile artifact ascending thoracic aorta. Otherwise, thoracic aortic arch, great vessels traversing the superior  mediastinum, and ascending thoracic aorta normally opacify. Imaged content upper abdomen reveals 2 cm gallstone gallbladder lumen. Impression  Geographic groundglass opacities throughout the lungs. Consider  infectious/inflammatory process including viral etiologies. No CT evidence for PE. Cholelithiasis. IMPRESSION:   1. COVID Pneumonia -  8/19 CXR shows B/L peripheral infiltrates consistent w/dx COVID-19 pneumonia  2. Acute hypoxic respiratory failure resolved  3. Rule out obstructive sleep apnea syndrome  Body mass index is 47.29 kg/m². RECOMMENDATIONS/PLAN:     1. Pt was not vaccinated   2. O2sat 93% on room air was on 1l/min nasal cannula pulse ox room air and ambulation  3. Supplemental O2 to keep sats > 93%  4. She finished remdesivir   5.  Zosyn, Decadron, Zithromax change Decadron to oral pill  6. She needs sleep study as an outpatient  7.  Chest x-ray still showed bilateral infiltrate consistent with COVID-19 pneumonia             Hector Urbano MD

## 2021-08-26 NOTE — DISCHARGE INSTRUCTIONS
Discharge Instructions       PATIENT ID: Maricruz Covington  MRN: [id number]   YOB: 1955    DATE OF ADMISSION: 8/19/2021  1:19 PM    DATE OF DISCHARGE: 8/26/2021    PRIMARY CARE PROVIDER: None     ATTENDING PHYSICIAN: Viet Kwong MD  DISCHARGING PROVIDER: Izabella Sylvester MD    To contact this individual call [phone] and ask the  to page. If unavailable ask to be transferred the Adult Hospitalist Department. DISCHARGE DIAGNOSES COVID-19 pneumonitis    CONSULTATIONS: IP CONSULT TO PULMONOLOGY    PROCEDURES/SURGERIES: * No surgery found *    PENDING TEST RESULTS:   At the time of discharge the following test results are still pending: None    FOLLOW UP APPOINTMENTS:   Follow-up Information     Follow up With Specialties Details Why Contact Info    None    None ([phone]) Patient stated that they have no PCP      Viet Kwong MD Family Medicine In 1 week  [address]  [phone]             ADDITIONAL CARE RECOMMENDATIONS: Discharged home on 2 L oxygen nasal cannula    DIET: Cardiac Diet      ACTIVITY: Activity as tolerated    Wound care: {[facility] Wound Care Instructions:[id number]}    EQUIPMENT needed: Oxygen 2 L      DISCHARGE MEDICATIONS:   See Medication Reconciliation Form    · It is important that you take the medication exactly as they are prescribed. · Keep your medication in the bottles provided by the pharmacist and keep a list of the medication names, dosages, and times to be taken in your wallet. · Do not take other medications without consulting your doctor. NOTIFY YOUR PHYSICIAN FOR ANY OF THE FOLLOWING:   Fever over 101 degrees for 24 hours. Chest pain, shortness of breath, fever, chills, nausea, vomiting, diarrhea, change in mentation, falling, weakness, bleeding. Severe pain or pain not relieved by medications. Or, any other signs or symptoms that you may have questions about.       DISPOSITION:    Home With:   OT  PT [facility]  RN       SNF/Inpatient Rehab/LTAC    Independent/assisted living    Hospice    Other:         PROBLEM LIST Updated:  Yes  yes       Signed:   Zeynep Fonseca MD  8/26/2021  12:48 PM

## 2021-08-26 NOTE — PROGRESS NOTES
Oxygen delivered to nurses station by [facility]. CM explained use of oxygen machine to patient and IMM letter signed. Patient said that she spoke with her  and he will pick her up for discharge.

## 2021-08-26 NOTE — PROGRESS NOTES
PULMONARY NOTE  [facility]    Name: Ramirez Rowe MRN: [id number]   : 1955 Hospital: [address] [place] [address]   Date: 2021  Admission date: 2021 Hospital Day: 8       HPI:     Hospital Problems  Never Reviewed        Codes Class Noted POA    Hypoxia ICD-10-CM: R09.02  ICD-9-CM: 799.02  2021 Unknown        COVID-19 ICD-10-CM: U07.1  ICD-9-CM: 079.89  2021 Unknown                   [x] High complexity decision making was performed  [x] See my orders for details      Subjective/Initial History:     I was asked by Alexis Foster MD to see Ramirez Rowe  a 72 y.o.  female in consultation     Excerpts from admission 2021 or consult notes as follows:   Ms. Kuldeep Serrato is a 73 yo F came to the ER d/t SOB, fever, fatigue and generalised weakness. Pt was admitted for COVID19 Pneumonitis -  CXR shows B/L peripheral infiltrates consistent w/ diagnosis. Pulmo consulted for Management.  - Pt was seen up and alert in room sitting on the side of the bed, denies SOB, no acute complaints at this time  O2sat 93% on room air was on 1l/min nasal cannula      MAR reviewed and pertinent medications noted or modified as needed     Current Facility-Administered Medications   Medication    dexamethasone (DECADRON) 4 mg/mL injection 4 mg    amLODIPine (NORVASC) tablet 10 mg    lisinopriL (PRINIVIL, ZESTRIL) tablet 10 mg    melatonin tablet 3 mg    benzonatate (TESSALON) capsule 200 mg    0.9% sodium chloride infusion    acetaminophen (TYLENOL) tablet 650 mg    Or    acetaminophen (TYLENOL) suppository 660 mg    polyethylene glycol (MIRALAX) packet 17 g    ondansetron (ZOFRAN ODT) tablet 4 mg    Or    ondansetron (ZOFRAN) injection 4 mg    enoxaparin (LOVENOX) injection 40 mg    azithromycin (ZITHROMAX) tablet 500 mg      Patient PCP: None  PMH:  has no past medical history on file. PSH:   has no past surgical history on file.    FHX: family history is not on file. SHX:  reports that she has never smoked. She has never used smokeless tobacco. She reports previous alcohol use. She reports that she does not use drugs. ROS:    Review of Systems   Constitutional: Negative for malaise/fatigue. HENT: Positive for congestion. Negative for sore throat. Eyes: Negative. Respiratory: Negative for cough, sputum production and shortness of breath. Cardiovascular: Negative. Gastrointestinal: Negative. Genitourinary: Negative. Musculoskeletal: Negative. Skin: Negative. Neurological: Negative. Psychiatric/Behavioral: Negative. All other systems reviewed and are negative. Objective:     Vital Signs: Telemetry:    normal sinus rhythm Intake/Output:   Visit Vitals  BP (!) 128/58 (BP 1 Location: Left lower arm, BP Patient Position: At rest;Lying right side)   Pulse 74   Temp 97.6 °F (36.4 °C)   Resp 18   Ht 5' 8\" (1.727 m)   Wt 311 lb (141.1 kg)   SpO2 95%   BMI 47.29 kg/m²       Temp (24hrs), Av.6 °F (36.4 °C), Min:97.4 °F (36.3 °C), Max:97.7 °F (36.5 °C)        O2 Device: Nasal cannula O2 Flow Rate (L/min): 2 l/min       Wt Readings from Last 4 Encounters:   21 311 lb (141.1 kg)        No intake or output data in the 24 hours ending 21 0737    Last shift:      No intake/output data recorded. Last 3 shifts: No intake/output data recorded. Physical Exam:     Physical Exam  Constitutional:       Appearance: Normal appearance. She is obese. She is not ill-appearing. HENT:      Head: Normocephalic and atraumatic. Nose: Nose normal.      Mouth/Throat:      Mouth: Mucous membranes are moist.   Eyes:      Pupils: Pupils are equal, round, and reactive to light. Cardiovascular:      Rate and Rhythm: Normal rate and regular rhythm. Pulses: Normal pulses. Heart sounds: Normal heart sounds. Pulmonary:      Effort: No respiratory distress. Breath sounds: No wheezing or rhonchi.    Chest:      Chest wall: No tenderness. Abdominal:      General: Abdomen is flat. Bowel sounds are normal.      Palpations: Abdomen is soft. Musculoskeletal:         General: Normal range of motion. Cervical back: Normal range of motion and neck supple. Skin:     General: Skin is warm. Neurological:      General: No focal deficit present. Mental Status: She is alert. Psychiatric:         Mood and Affect: Mood normal.          Labs:    Recent Labs     08/23/21  0755   WBC 10.5   HGB 13.6        Recent Labs     08/23/21  0755      K 3.7      CO2 30   *   BUN 16   CREA 0.75   CA 8.4*   ALB 3.0*   ALT 36     No results for input(s): PH, PCO2, PO2, HCO3, FIO2 in the last 72 hours. No results for input(s): CPK, CKNDX, TROIQ in the last 72 hours. No lab exists for component: CPKMB  No results found for: BNPP, BNP   Lab Results   Component Value Date/Time    Culture result: No growth 5 days 08/19/2021 09:28 PM    Culture result: No growth 5 days 08/19/2021 09:25 PM    Culture result: No growth 6 days 08/19/2021 01:40 PM   No results found for: TSH, TSHEXT, TSHEXT    Imaging:    CXR Results  (Last 48 hours)    None        Results from Hospital Encounter encounter on 08/19/21    XR CHEST PORT    Narrative  Chest one view. AP upright portable at 1009 dated 8/22/2021. Comparison 8/19/2021. Bilateral pneumonia is redemonstrated with increasing  opacity in the right upper lobe from before. There is no pleural fluid or  pneumothorax. The cardiomediastinal silhouette is stable. Impression  Bilateral pneumonia. XR CHEST PORT    Narrative  Portable upright radiograph of the chest 1:45 p.m. no prior study. INDICATION: Covid positive, hypoxia. Heart size is at the upper limits of normal with a tortuous aorta. Bilateral  peripheral infiltrates are noted consistent with history of Covid positive  status. No effusion or pneumothorax. No acute bony findings.     Impression  Bilateral peripheral infiltrates consistent with Covid pneumonia. Results from [place] encounter on 08/19/21    CTA CHEST W OR W WO CONT    Narrative  CTA chest.    Axial images are reviewed along with reformatted sagittal/coronal/MIP images. 100 mL Isovue 370 administered. Dose reduction: All CT scans at this facility are performed using dose reduction  optimization techniques as appropriate to a performed exam including the  following-  automated exposure control, adjustments of mA and/or Kv according to patient  size, or use of iterative reconstructive technique. Geographic groundglass opacities present throughout the lungs. Findings in  keeping with infectious/inflammatory process including viral etiologies. No  pleural effusion. Enhanced images demonstrate normal contrast opacification, normal caliber  pulmonary arterial trunk. Central pulmonary arterial branch vessels normally  opacify; no CT evidence for PE. Pulsatile artifact ascending thoracic aorta. Otherwise, thoracic aortic arch, great vessels traversing the superior  mediastinum, and ascending thoracic aorta normally opacify. Imaged content upper abdomen reveals 2 cm gallstone gallbladder lumen. Impression  Geographic groundglass opacities throughout the lungs. Consider  infectious/inflammatory process including viral etiologies. No CT evidence for PE. Cholelithiasis. IMPRESSION:   1. COVID Pneumonia -  8/19 CXR shows B/L peripheral infiltrates consistent w/dx COVID-19 pneumonia  2. Acute hypoxic respiratory failure   3. Rule out obstructive sleep apnea syndrome  Body mass index is 47.29 kg/m². RECOMMENDATIONS/PLAN:     1. Pt was not vaccinated   2. O2sat 93% on room air was on 1l/min nasal cannula   3. Supplemental O2 to keep sats > 93%  4. She finished remdesivir will discontinue Zosyn  5. Zosyn, Decadron, Zithromax   6. She needs sleep study as an outpatient  7.  Chest x-ray still showed bilateral infiltrate consistent with COVID-19 pneumonia             Aung Cummins

## 2021-08-26 NOTE — PROGRESS NOTES
Sp02 on room air at rest 96%  Sp02 on room air while ambulating 86%  Placed patient on 2lpm while ambulating sp02 93%

## 2021-08-26 NOTE — PROGRESS NOTES
Discharge plan of care/case management plan validated with provider discharge order. Discharge instructions reviewed with patient at bedside. Pateint acknowledged understanding of dc orders, medications for discharge and prescriptions awaiting pickup from her pharmacy. Patient acknowledged understanding of follow up appointments. Patient escorted to private vehicle via wheelchair. All patient belongings accounted for.

## 2021-08-26 NOTE — DISCHARGE SUMMARY
Discharge Summary       PATIENT ID: Deepak Herrera  MRN: [id number]   YOB: 1955    DATE OF ADMISSION: 8/19/2021  1:19 PM    DATE OF DISCHARGE:   PRIMARY CARE PROVIDER: None     ATTENDING PHYSICIAN: Jadiel De  DISCHARGING PROVIDER: Jadiel De      CONSULTATIONS: IP CONSULT TO PULMONOLOGY    PROCEDURES/SURGERIES: * No surgery found *    ADMITTING DIAGNOSES:    Patient Active Problem List    Diagnosis Date Noted    Hypoxia 08/19/2021    COVID-19 08/19/2021       DISCHARGE DIAGNOSES / PLAN:      Acute hypoxemic respiratory failure/on 4 L  COVID-19 pneumonitis  Hypertension        DISCHARGE MEDICATIONS:  Current Discharge Medication List      START taking these medications    Details   amLODIPine (NORVASC) 10 mg tablet Take 1 Tablet by mouth daily. Indications: high blood pressure  Qty: 30 Tablet, Refills: 0  Start date: 8/27/2021      azithromycin (ZITHROMAX) 500 mg tab Take 1 Tablet by mouth daily. Qty: 3 Tablet, Refills: 0  Start date: 8/27/2021      benzonatate (TESSALON) 200 mg capsule Take 1 Capsule by mouth three (3) times daily as needed for Cough for up to 7 days. Qty: 20 Capsule, Refills: 0  Start date: 8/26/2021, End date: 9/2/2021      dexAMETHasone (DECADRON) 4 mg tablet Twice a day  Qty: 30 Tablet, Refills: 0  Start date: 8/26/2021      lisinopriL (PRINIVIL, ZESTRIL) 10 mg tablet Take 1 Tablet by mouth daily. Qty: 30 Tablet, Refills: 0  Start date: 8/27/2021      melatonin 3 mg tablet Take 1 Tablet by mouth nightly. Qty: 30 Tablet, Refills: 0  Start date: 8/26/2021         STOP taking these medications       doxycycline (VIBRAMYCIN) 100 mg capsule Comments:   Reason for Stopping:                 NOTIFY YOUR PHYSICIAN FOR ANY OF THE FOLLOWING:   Fever over 101 degrees for 24 hours. Chest pain, shortness of breath, fever, chills, nausea, vomiting, diarrhea, change in mentation, falling, weakness, bleeding. Severe pain or pain not relieved by medications.   Or, any other signs or symptoms that you may have questions about. DISPOSITION:  x  Home With:   OT  PT  HH  RN       Long term SNF/Inpatient Rehab    Independent/assisted living    Hospice    Other:       PATIENT CONDITION AT DISCHARGE: Stable      PHYSICAL EXAMINATION AT DISCHARGE:  General:          Alert, cooperative, no distress, appears stated age. HEENT:           Atraumatic, anicteric sclerae, pink conjunctivae                          No oral ulcers, mucosa moist, throat clear, dentition fair  Neck:               Supple, symmetrical  Lungs:             Clear to auscultation bilaterally. No Wheezing or Rhonchi. No rales. Chest wall:      No tenderness  No Accessory muscle use. Heart:              Regular  rhythm,  No  murmur   No edema  Abdomen:        Soft, non-tender. Not distended. Bowel sounds normal  Extremities:     No cyanosis. No clubbing,                            Skin turgor normal, Capillary refill normal  Skin:                Not pale. Not Jaundiced  No rashes   Psych:             Not anxious or agitated. Neurologic:      Alert, moves all extremities, answers questions appropriately and responds to commands     XR CHEST PORT   Final Result   Bilateral pneumonia. CTA CHEST W OR W WO CONT   Final Result   Geographic groundglass opacities throughout the lungs. Consider   infectious/inflammatory process including viral etiologies. No CT evidence for PE. Cholelithiasis. XR CHEST PORT   Final Result   Bilateral peripheral infiltrates consistent with Covid pneumonia. No results found for this or any previous visit (from the past 24 hour(s)).        HOSPITAL COURSE:    Patient is a 72y.o. year old female with no past medical history came to emergency room because of generalized weakness shortness of breath generalized weakness shortness of breath fever malaise patient was seen by the urgent care 2 days ago started on doxycycline but the condition get more worse came to emergency room seen by the ER physician, the patient to be admitted for COVID-19 pneumonitis    While in the hospital patient received Zithromax Zosyn remdesivir and Decadron patient initially on high flow switched to 4 L patient resting pulse ox is stable but while ambulating pulse ox drops    Patient plan to discharge home on 2 L oxygen by nasal cannula    Patient discharged home on 2 L oxygen by nasal cannula    Medication reconciliation done temperature patient 35 minutes 50% time spent counseling and coordination of care      Signed:   Gerson Cabezas MD  8/26/2021  12:49 PM

## 2021-08-26 NOTE — PROGRESS NOTES
CM obtained choice for home oxygen supplier from patient. Choice is [facility]. Referral sent to [employer] via Radha PEDROZA. Order accepted, and [employer] will be delivering portable oxygen to facility shortly. [facility] acute hospital[facility] 8/26/[facility]. Patient has declined home health services again. [facility] informed her that if she changes her mind, she can get orders for home health from her PCP. IMM given verbally via telephone. Patient is clear to discharge home with home oxygen from [facility]. Nurse notified.

## 2021-08-27 ENCOUNTER — PATIENT OUTREACH (OUTPATIENT)
Dept: CASE MANAGEMENT | Age: 66
End: 2021-08-27

## 2021-08-27 NOTE — PROGRESS NOTES
Patient contacted regarding COVID-19 diagnosis. Discussed COVID-19 related testing which was available at this time. Test results were positive. Patient informed of results, if available? yes. Care Transition Nurse contacted the patient by telephone to perform post discharge assessment. Call within 2 business days of discharge: Yes Verified name and  with patient as identifiers. Provided introduction to self, and explanation of the CTN/ACM role, and reason for call due to risk factors for infection and/or exposure to COVID-19. Symptoms reviewed with patient who verbalized the following symptoms: shortness of breath, no new symptoms and no worsening symptoms      Due to no new or worsening symptoms encounter was not routed to provider for escalation. Discussed follow-up appointments. If no appointment was previously scheduled, appointment scheduling offered:  yes. [facility] follow up appointment(s): No future appointments. Non-[facility] follow up appointment(s): none    Interventions to address risk factors: Obtained and reviewed discharge summary and/or continuity of care documents     Advance Care Planning:   Does patient have an Advance Directive: patient declined education. Educated patient about risk for severe COVID-19 due to risk factors according to CDC guidelines. CTN reviewed discharge instructions, medical action plan and red flag symptoms with the patient who verbalized understanding. Discussed COVID vaccination status: yes. Education provided on COVID-19 vaccination as appropriate. Discussed exposure protocols and quarantine with CDC Guidelines. Patient was given an opportunity to verbalize any questions and concerns and agrees to contact CTN or health care provider for questions related to their healthcare.     Reviewed and educated patient on any new and changed medications related to discharge diagnosis     Was patient discharged with a pulse oximeter? yes Discussed and confirmed pulse oximeter discharge instructions and when to notify provider or seek emergency care. CTN provided contact information. Plan for follow-up call in 5-7 days based on severity of symptoms and risk factors.

## 2021-09-15 ENCOUNTER — PATIENT OUTREACH (OUTPATIENT)
Dept: CASE MANAGEMENT | Age: 66
End: 2021-09-15

## 2021-09-15 NOTE — PROGRESS NOTES
Patient contacted regarding COVID-19 diagnosis. Discussed COVID-19 related testing which was available at this time. Test results were positive. Patient informed of results, if available? yes      Care Transition Nurse contacted the patient by telephone to perform follow-up assessment. Verified name and  with patient as identifiers. Patient has following risk factors of: no known risk factors. Symptoms reviewed with patient who verbalized the following symptoms: fatigue. Due to no new or worsening symptoms encounter was not routed to provider for escalation. Interventions to address risk factors: Obtained and reviewed discharge summary and/or continuity of care documents    Educated patient about risk for severe COVID-19 due to risk factors according to CDC guidelines. CTN reviewed discharge instructions, medical action plan and red flag symptoms with the patient who verbalized understanding. Discussed COVID vaccination status: yes. Education provided on COVID-19 vaccination as appropriate. Discussed exposure protocols and quarantine with CDC Guidelines. Patient was given an opportunity to verbalize any questions and concerns and agrees to contact CTN or health care provider for questions related to their healthcare. Reviewed and educated patient on any new and changed medications related to discharge diagnosis     Was patient discharged with a pulse oximeter? no     CTN provided contact information. Plan for follow-up call in 5-7 days based on severity of symptoms and risk factors.

## 2021-09-24 ENCOUNTER — HOSPITAL ENCOUNTER (EMERGENCY)
Age: 66
Discharge: HOME | End: 2021-09-25
Attending: EMERGENCY MEDICINE | Admitting: FAMILY MEDICINE
Payer: MEDICARE

## 2021-09-24 ENCOUNTER — APPOINTMENT (OUTPATIENT)
Dept: GENERAL RADIOLOGY | Age: 66
End: 2021-09-24
Attending: EMERGENCY MEDICINE
Payer: MEDICARE

## 2021-09-24 ENCOUNTER — PATIENT OUTREACH (OUTPATIENT)
Dept: CASE MANAGEMENT | Age: 66
End: 2021-09-24

## 2021-09-24 DIAGNOSIS — R09.02 HYPOXIA: Primary | ICD-10-CM

## 2021-09-24 LAB
ALBUMIN SERPL-MCNC: 2.9 G/DL (ref 3.5–5)
ALBUMIN/GLOB SERPL: 1 {RATIO} (ref 1.1–2.2)
ALP SERPL-CCNC: 59 U/L (ref 45–117)
ALT SERPL-CCNC: 25 U/L (ref 12–78)
ANION GAP SERPL CALC-SCNC: 8 MMOL/L (ref 5–15)
AST SERPL W P-5'-P-CCNC: 16 U/L (ref 15–37)
BASOPHILS # BLD: 0 K/UL (ref 0–0.1)
BASOPHILS NFR BLD: 0 % (ref 0–1)
BILIRUB SERPL-MCNC: 0.7 MG/DL (ref 0.2–1)
BNP SERPL-MCNC: 181 PG/ML
BUN SERPL-MCNC: 10 MG/DL (ref 6–20)
BUN/CREAT SERPL: 13 (ref 12–20)
CA-I BLD-MCNC: 8.5 MG/DL (ref 8.5–10.1)
CHLORIDE SERPL-SCNC: 101 MMOL/L (ref 97–108)
CO2 SERPL-SCNC: 27 MMOL/L (ref 21–32)
CREAT SERPL-MCNC: 0.79 MG/DL (ref 0.55–1.02)
DIFFERENTIAL METHOD BLD: ABNORMAL
EOSINOPHIL # BLD: 0.1 K/UL (ref 0–0.4)
EOSINOPHIL NFR BLD: 2 % (ref 0–7)
ERYTHROCYTE [DISTWIDTH] IN BLOOD BY AUTOMATED COUNT: 14.2 % (ref 11.5–14.5)
GLOBULIN SER CALC-MCNC: 3 G/DL (ref 2–4)
GLUCOSE SERPL-MCNC: 197 MG/DL (ref 65–100)
HCT VFR BLD AUTO: 37.7 % (ref 35–47)
HGB BLD-MCNC: 12.3 G/DL (ref 11.5–16)
IMM GRANULOCYTES # BLD AUTO: 0.1 K/UL (ref 0–0.04)
IMM GRANULOCYTES NFR BLD AUTO: 2 % (ref 0–0.5)
LYMPHOCYTES # BLD: 1 K/UL (ref 0.8–3.5)
LYMPHOCYTES NFR BLD: 20 % (ref 12–49)
MCH RBC QN AUTO: 29 PG (ref 26–34)
MCHC RBC AUTO-ENTMCNC: 32.6 G/DL (ref 30–36.5)
MCV RBC AUTO: 88.9 FL (ref 80–99)
MONOCYTES # BLD: 0.4 K/UL (ref 0–1)
MONOCYTES NFR BLD: 9 % (ref 5–13)
NEUTS SEG # BLD: 3.5 K/UL (ref 1.8–8)
NEUTS SEG NFR BLD: 67 % (ref 32–75)
NRBC # BLD: 0.02 K/UL (ref 0–0.01)
NRBC BLD-RTO: 0.4 PER 100 WBC
PLATELET # BLD AUTO: 213 K/UL (ref 150–400)
PMV BLD AUTO: 10.4 FL (ref 8.9–12.9)
POTASSIUM SERPL-SCNC: 4.1 MMOL/L (ref 3.5–5.1)
PROT SERPL-MCNC: 5.9 G/DL (ref 6.4–8.2)
RBC # BLD AUTO: 4.24 M/UL (ref 3.8–5.2)
SODIUM SERPL-SCNC: 136 MMOL/L (ref 136–145)
TROPONIN I SERPL-MCNC: <0.05 NG/ML
WBC # BLD AUTO: 5.1 K/UL (ref 3.6–11)

## 2021-09-24 PROCEDURE — 85025 COMPLETE CBC W/AUTO DIFF WBC: CPT

## 2021-09-24 PROCEDURE — 84484 ASSAY OF TROPONIN QUANT: CPT

## 2021-09-24 PROCEDURE — 83880 ASSAY OF NATRIURETIC PEPTIDE: CPT

## 2021-09-24 PROCEDURE — 71045 X-RAY EXAM CHEST 1 VIEW: CPT

## 2021-09-24 PROCEDURE — 36415 COLL VENOUS BLD VENIPUNCTURE: CPT

## 2021-09-24 PROCEDURE — 96374 THER/PROPH/DIAG INJ IV PUSH: CPT

## 2021-09-24 PROCEDURE — 99285 EMERGENCY DEPT VISIT HI MDM: CPT

## 2021-09-24 PROCEDURE — 80053 COMPREHEN METABOLIC PANEL: CPT

## 2021-09-24 PROCEDURE — 93005 ELECTROCARDIOGRAM TRACING: CPT

## 2021-09-24 NOTE — Clinical Note
Status[de-identified] INPATIENT [101]   Type of Bed: Medical [8]   Cardiac Monitoring Required?: No   Inpatient Hospitalization Certified Necessary for the Following Reasons: 3.  Patient receiving treatment that can only be provided in an inpatient setting (further clarification in H&P documentation)   Admitting Diagnosis: Hypoxia [354264]   Admitting Physician: Narciso Vance [[id number]]   Attending Physician: Narciso Vance [[id number]]   Estimated Length of Stay: 2 Midnights   Discharge Plan[de-identified] Home with Office Follow-up

## 2021-09-24 NOTE — PROGRESS NOTES
Patient resolved from Transition of Care episode on 9/24/2021. ACM/CTN was unsuccessful at contacting this patient today. Patient/family was provided the following resources and education related to COVID-19 during the initial call:                         Signs, symptoms and red flags related to COVID-19            CDC exposure and quarantine guidelines            [facility] exposure contact - [phone]            Contact for their local Department of Health                 Patient has not had any additional ED or hospital visits. No further outreach scheduled with this CTN/ACM. Episode of Care resolved. Patient has this CTN/ACM contact information if future needs arise.

## 2021-09-25 ENCOUNTER — APPOINTMENT (OUTPATIENT)
Dept: CT IMAGING | Age: 66
End: 2021-09-25
Attending: EMERGENCY MEDICINE
Payer: MEDICARE

## 2021-09-25 VITALS
WEIGHT: 293 LBS | BODY MASS INDEX: 45.99 KG/M2 | OXYGEN SATURATION: 94 % | HEIGHT: 67 IN | HEART RATE: 75 BPM | RESPIRATION RATE: 20 BRPM | DIASTOLIC BLOOD PRESSURE: 66 MMHG | SYSTOLIC BLOOD PRESSURE: 112 MMHG | TEMPERATURE: 99 F

## 2021-09-25 LAB
ATRIAL RATE: 107 BPM
CALCULATED P AXIS, ECG09: 27 DEGREES
CALCULATED R AXIS, ECG10: -3 DEGREES
CALCULATED T AXIS, ECG11: 34 DEGREES
DIAGNOSIS, 93000: NORMAL
P-R INTERVAL, ECG05: 124 MS
Q-T INTERVAL, ECG07: 316 MS
QRS DURATION, ECG06: 76 MS
QTC CALCULATION (BEZET), ECG08: 421 MS
VENTRICULAR RATE, ECG03: 107 BPM

## 2021-09-25 PROCEDURE — 74011000636 HC RX REV CODE- 636: Performed by: EMERGENCY MEDICINE

## 2021-09-25 PROCEDURE — 74011000250 HC RX REV CODE- 250: Performed by: EMERGENCY MEDICINE

## 2021-09-25 PROCEDURE — 74011250636 HC RX REV CODE- 250/636: Performed by: INTERNAL MEDICINE

## 2021-09-25 PROCEDURE — 71275 CT ANGIOGRAPHY CHEST: CPT

## 2021-09-25 RX ORDER — IPRATROPIUM BROMIDE AND ALBUTEROL SULFATE 2.5; .5 MG/3ML; MG/3ML
3 SOLUTION RESPIRATORY (INHALATION)
Status: COMPLETED | OUTPATIENT
Start: 2021-09-25 | End: 2021-09-25

## 2021-09-25 RX ORDER — DEXAMETHASONE 4 MG/1
4 TABLET ORAL 2 TIMES DAILY WITH MEALS
Qty: 28 TABLET | Refills: 0 | Status: SHIPPED | OUTPATIENT
Start: 2021-09-25 | End: 2021-10-09

## 2021-09-25 RX ORDER — ZINC GLUCONATE 50 MG
50 TABLET ORAL DAILY
Qty: 30 TABLET | Refills: 0 | Status: SHIPPED | OUTPATIENT
Start: 2021-09-25 | End: 2021-10-25

## 2021-09-25 RX ORDER — DEXAMETHASONE SODIUM PHOSPHATE 4 MG/ML
10 INJECTION, SOLUTION INTRA-ARTICULAR; INTRALESIONAL; INTRAMUSCULAR; INTRAVENOUS; SOFT TISSUE ONCE
Status: COMPLETED | OUTPATIENT
Start: 2021-09-25 | End: 2021-09-25

## 2021-09-25 RX ORDER — AZITHROMYCIN 500 MG/1
500 TABLET, FILM COATED ORAL DAILY
Qty: 7 TABLET | Refills: 0 | Status: SHIPPED | OUTPATIENT
Start: 2021-09-25 | End: 2021-10-02

## 2021-09-25 RX ADMIN — IPRATROPIUM BROMIDE AND ALBUTEROL SULFATE 3 ML: .5; 2.5 SOLUTION RESPIRATORY (INHALATION) at 03:27

## 2021-09-25 RX ADMIN — IOPAMIDOL 100 ML: 755 INJECTION, SOLUTION INTRAVENOUS at 00:53

## 2021-09-25 RX ADMIN — DEXAMETHASONE SODIUM PHOSPHATE 10 MG: 4 INJECTION, SOLUTION INTRA-ARTICULAR; INTRALESIONAL; INTRAMUSCULAR; INTRAVENOUS; SOFT TISSUE at 08:20

## 2021-09-25 NOTE — ED TRIAGE NOTES
Pt dx with covid aug 15. Yesterday started with cough and sob. States o2 sats are dropping with getting up and walking.

## 2021-09-25 NOTE — ED PROVIDER NOTES
EMERGENCY DEPARTMENT HISTORY AND PHYSICAL EXAM      Date: 9/24/2021  Patient Name: Wayne Hines      History of Presenting Illness     Chief Complaint   Patient presents with    Shortness of Breath       History Provided By: Patient and Patient's     HPI: Wayne Hines, 72 y.o. female with a past medical history significant obesity and COVID PNA (8/21) presents to the ED with cc of shortness of breath and hypoxia. Patient was diagnosed with Covid last month. She had an 8-day hospitalization requiring ICU level of care at that time. She was never intubated. She was discharged home on 2 L nasal cannula. Since discharge she has been doing well being able to wean off her oxygen however over the last 2 days she is gotten significantly worse with severe dyspnea on exertion and hypoxia despite using her 2 L of oxygen. She has no known sick contacts. She has a lingering cough but does not have any other new infectious symptoms. She states she took a 30-day course of steroids but has completed this course. She plans to get the Covid vaccine when she is able. There are no other complaints, changes, or physical findings at this time. PCP: None    Current Outpatient Medications   Medication Sig Dispense Refill    amLODIPine (NORVASC) 10 mg tablet Take 1 Tablet by mouth daily. Indications: high blood pressure 30 Tablet 0    azithromycin (ZITHROMAX) 500 mg tab Take 1 Tablet by mouth daily. 3 Tablet 0    dexAMETHasone (DECADRON) 4 mg tablet Twice a day 30 Tablet 0    lisinopriL (PRINIVIL, ZESTRIL) 10 mg tablet Take 1 Tablet by mouth daily. 30 Tablet 0    melatonin 3 mg tablet Take 1 Tablet by mouth nightly. 30 Tablet 0       Past History     Past Medical History:  No past medical history on file. Past Surgical History:  No past surgical history on file. Family History:  No family history on file.     Social History:  Social History     Tobacco Use    Smoking status: Never Smoker    Smokeless tobacco: Never Used   Substance Use Topics    Alcohol use: Not Currently    Drug use: Never       Allergies:  No Known Allergies      Review of Systems     Review of Systems   Constitutional: Negative for activity change and fever. HENT: Negative for rhinorrhea and sore throat. Eyes: Negative for visual disturbance. Respiratory: Positive for cough and shortness of breath. Cardiovascular: Negative for chest pain. Gastrointestinal: Negative for abdominal pain, diarrhea, nausea and vomiting. Genitourinary: Negative for dysuria. Musculoskeletal: Negative for arthralgias and myalgias. Skin: Negative for rash and wound. Neurological: Negative for syncope and headaches. Psychiatric/Behavioral: Negative for confusion. All other systems reviewed and are negative. Physical Exam     Physical Exam  Vitals and nursing note reviewed. Constitutional:       Appearance: Normal appearance. She is normal weight. HENT:      Head: Normocephalic and atraumatic. Nose: Nose normal.      Mouth/Throat:      Mouth: Mucous membranes are moist.   Eyes:      Conjunctiva/sclera: Conjunctivae normal.   Cardiovascular:      Rate and Rhythm: Normal rate. Pulses: Normal pulses. Pulmonary:      Effort: Pulmonary effort is normal. Tachypnea present. No accessory muscle usage or respiratory distress. Breath sounds: No decreased breath sounds. Musculoskeletal:         General: No swelling or deformity. Normal range of motion. Right lower leg: Edema present. Left lower leg: Edema present. Skin:     General: Skin is warm and dry. Findings: No rash. Neurological:      General: No focal deficit present. Mental Status: She is alert.    Psychiatric:         Mood and Affect: Mood normal.         Behavior: Behavior normal.         Lab and Diagnostic Study Results     Labs -     Recent Results (from the past 12 hour(s))   CBC WITH AUTOMATED DIFF    Collection Time: 09/24/21 10:29 PM   Result Value Ref Range    WBC 5.1 3.6 - 11.0 K/uL    RBC 4.24 3.80 - 5.20 M/uL    HGB 12.3 11.5 - 16.0 g/dL    HCT 37.7 35.0 - 47.0 %    MCV 88.9 80.0 - 99.0 FL    MCH 29.0 26.0 - 34.0 PG    MCHC 32.6 30.0 - 36.5 g/dL    RDW 14.2 11.5 - 14.5 %    PLATELET 343 967 - 713 K/uL    MPV 10.4 8.9 - 12.9 FL    NRBC 0.4 (H) 0.0  WBC    ABSOLUTE NRBC 0.02 (H) 0.00 - 0.01 K/uL    NEUTROPHILS 67 32 - 75 %    LYMPHOCYTES 20 12 - 49 %    MONOCYTES 9 5 - 13 %    EOSINOPHILS 2 0 - 7 %    BASOPHILS 0 0 - 1 %    IMMATURE GRANULOCYTES 2 (H) 0 - 0.5 %    ABS. NEUTROPHILS 3.5 1.8 - 8.0 K/UL    ABS. LYMPHOCYTES 1.0 0.8 - 3.5 K/UL    ABS. MONOCYTES 0.4 0.0 - 1.0 K/UL    ABS. EOSINOPHILS 0.1 0.0 - 0.4 K/UL    ABS. BASOPHILS 0.0 0.0 - 0.1 K/UL    ABS. IMM. GRANS. 0.1 (H) 0.00 - 0.04 K/UL    DF AUTOMATED     METABOLIC PANEL, COMPREHENSIVE    Collection Time: 09/24/21 10:29 PM   Result Value Ref Range    Sodium 136 136 - 145 mmol/L    Potassium 4.1 3.5 - 5.1 mmol/L    Chloride 101 97 - 108 mmol/L    CO2 27 21 - 32 mmol/L    Anion gap 8 5 - 15 mmol/L    Glucose 197 (H) 65 - 100 mg/dL    BUN 10 6 - 20 mg/dL    Creatinine 0.79 0.55 - 1.02 mg/dL    BUN/Creatinine ratio 13 12 - 20      GFR est AA >60 >60 ml/min/1.73m2    GFR est non-AA >60 >60 ml/min/1.73m2    Calcium 8.5 8.5 - 10.1 mg/dL    Bilirubin, total 0.7 0.2 - 1.0 mg/dL    AST (SGOT) 16 15 - 37 U/L    ALT (SGPT) 25 12 - 78 U/L    Alk.  phosphatase 59 45 - 117 U/L    Protein, total 5.9 (L) 6.4 - 8.2 g/dL    Albumin 2.9 (L) 3.5 - 5.0 g/dL    Globulin 3.0 2.0 - 4.0 g/dL    A-G Ratio 1.0 (L) 1.1 - 2.2     TROPONIN I    Collection Time: 09/24/21 10:29 PM   Result Value Ref Range    Troponin-I, Qt. <0.05 <0.05 ng/mL   BNP    Collection Time: 09/24/21 10:29 PM   Result Value Ref Range    NT pro- (H) <125 pg/mL       Radiologic Studies -   [unfilled]  CT Results  (Last 48 hours)               09/25/21 0053  CTA CHEST W OR W WO CONT Final result    Impression:  Commonly reported imaging features of Covid 19 pneumonia are   present. Other processes such as influenza pneumonia and organizing pneumonia,   as can be seen with connective tissue diseases and drug toxicity can cause a   similar imaging pattern. Overall appearance of the chest has improved since the patient's prior exam. No   Pulmonary embolism or acute aortic abnormalities identified. Probable   hepatosplenomegaly with fatty infiltration of the liver noted, correlate with   liver function examinations. Cholelithiasis. Narrative:  HISTORY:  eval for PE, covid 1 month ago, here with hypoxia   Dose reduction technique: All CT scans at this facility are performed using dose reduction optimization   technique as appropriate on the exam including the following: Automated exposure   control, adjustment of the MA and/or KV according to patient size and/or use of   iterative reconstructive technique. TECHNIQUE: CTA CHEST W OR W WO CONT. CT angiogram of the chest is performed   with maximum intensity projection images (MIPS) generated. 100 cc [id number]-[id number] injected. COMPARISON: 8/19/2021   LIMITATIONS: None       LINES/TUBES/MEDICAL SUPPORT DEVICES:   None         LUNG PARENCHYMA: Mild patchy peripheral round glass and septal thickening   although significantly improved from the exam of August 2021   TRACHEA/BRONCHI: Normal   PULMONARY VESSELS: Normal. No definitive persistent central filling defects   identified on multiple contiguous images to diagnose pulmonary embolism. PLEURA: Normal   MEDIASTINUM: There are some persistent mediastinal lymph nodes that are   prominent but stable to improved since prior exam.   HEART: Normal   AORTA/GREAT VESSELS: No aortic aneurysm or dissection. ESOPHAGUS: Normal   AXILLAE: Normal   BONES/TISSUES: No acute abnormality       UPPER ABDOMEN: Negative for acute abnormality.  Fatty infiltration of the liver   with probable hepatomegaly although the liver extends below the inferior margin   of the exam. Spleen at the upper limits of normal in size to mildly enlarged. Intraluminal calcified gallstone measures 2.1 cm.   OTHER: None               CXR Results  (Last 48 hours)               09/24/21 2214  XR CHEST PORT Final result    Impression:  Findings/IMPRESSION:       The cardiac silhouette is unchanged in size. Heterogeneous airspace opacities throughout both lungs which have overall   improved since 8/22/2021. No large pleural effusion. No discernible   pneumothorax. Narrative:  Study: Chest radiograph(s), 1 view       Clinical Indication: Shortness of breath. Comparison: Chest radiographs dated 8/22/2021. Medical Decision Making and ED Course   - I am the first and primary provider for this patient AND AM THE PRIMARY PROVIDER OF RECORD. - I reviewed the vital signs, available nursing notes, past medical history, past surgical history, family history and social history. - Initial assessment performed. The patients presenting problems have been discussed, and the staff are in agreement with the care plan formulated and outlined with them. I have encouraged them to ask questions as they arise throughout their visit. Vital Signs-Reviewed the patient's vital signs.     Patient Vitals for the past 12 hrs:   Temp Pulse Resp BP SpO2   09/25/21 0611  96 21 (!) 144/76 96 %   09/25/21 0510  (!) 445 20 127/77 94 %   09/25/21 0330  100 24 114/61 96 %   09/25/21 0224  96 30 119/71 98 %   09/25/21 0105  (!) 102 24 115/89 97 %   09/25/21 0015  (!) 102 25 136/74 98 %   09/24/21 2338  (!) 107 26 133/63 98 %   09/24/21 2155 99 °F (37.2 °C) (!) 107 16 139/80 95 %             Records Reviewed: Nursing Notes, Old Medical Records, Previous Radiology Studies and Previous Laboratory Studies         ED Course:       ED Course as of Sep 25 0707   Sat Sep 25, 2021   267 79-year-old female with a past medical history of COVID-19 diagnosed in August 2021. She was discharged home on 2 L nasal cannula. She was doing well and able to come off oxygen briefly however last Tuesday she has developed recurrent shortness of breath and hypoxia with exertion down to the low 80s. This continued all day yesterday and today so she decided to present to the emergency department. She has no chest pain. 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 She is not anticoagulated. Differential diagnosis includes bacterial pneumonia versus viral pneumonia versus long haul COVID-19 versus pulmonary embolus. Labs including CBC, BMP, troponin, BMP are unremarkable. CTA for PE pending. Patient is currently on 2 L nasal cannula but will ambulate to evaluate for hypoxia with ambulation. [LW]   0301 Work-up is unremarkable. CT of the chest does not reveal PE. Her findings are stable to slightly improved. Will treat with a DuoNeb and ambulate patient on her baseline 2 L to see if she is able to maintain her saturations. Disposition as per clinical course.    [LW]   [phone] Patient hypoxic with sitting on side of bed and standing. Not able to tolerate ambulation. Admitted to [facility].    [LW]      ED Course User Index  [LW] Liliane Fox MD               Consultations:       Consultations: -  Hospitalist Consultant: Dr. Rosa Elena Morris: We have asked for emergent assistance with regard to this patient. We have discussed the patients HPI, ROS, PE and results this far. They will come and evaluate the patient for admission. Disposition     Disposition: Admitted to Floor Medical Floor the case was discussed with the admitting physician Santino. Admitted        Diagnosis     Clinical Impression:   1. Hypoxia        Attestations:    Sharlene Brandt MD    Please note that this dictation was completed with [employer], the [employer] voice recognition software.   Quite often unanticipated grammatical, syntax, homophones, and other interpretive errors are inadvertently transcribed by the computer software. Please disregard these errors. Please excuse any errors that have escaped final proofreading. Thank you.

## 2021-09-25 NOTE — H&P
History & Physical    Primary Care Provider: None  Source of Information: Patient     Requesting physician: Dr. Mont Lanes    History of Presenting Illness:   Manuel Montez is a 72 y.o. female who presents with complaints of progressive shortness of breath for few days. Recently diagnosed with COVID-19 with pneumonitis. She was on a tapering dose of oral steroids which lasted up till about a week ago. CT scan of the chest shows interstitial infiltrate much improved from previous CAT scan. No evidence of acute pulmonary embolism. She was given Decadron 10 mg IV in the ED and saturation noted to be above 95% on 2 L. She already has home oxygen. Decadron 4 mg oral twice daily will be prescribed as well as azithromycin 500 mg daily for 7 days. Review of Systems:  A comprehensive review of systems was negative except for that written in the History of Present Illness. No past medical history on file. No past surgical history on file. Prior to Admission medications    Medication Sig Start Date End Date Taking? Authorizing Provider   dexAMETHasone (DECADRON) 4 mg tablet Take 4 mg by mouth two (2) times daily (with meals) for 14 days. 9/25/21 10/9/21 Yes Sherrie Unger MD   azithromycin (ZITHROMAX) 500 mg tab Take 1 Tablet by mouth daily for 7 days. 9/25/21 10/2/21 Yes Sherrie Unger MD   zinc gluconate 50 mg tablet Take 1 Tablet by mouth daily for 30 days. 9/25/21 10/25/21 Yes Sherrie Unger MD   amLODIPine (NORVASC) 10 mg tablet Take 1 Tablet by mouth daily. Indications: high blood pressure 8/27/21   Kendal De MD   lisinopriL (PRINIVIL, ZESTRIL) 10 mg tablet Take 1 Tablet by mouth daily. 8/27/21   Kendal De MD   melatonin 3 mg tablet Take 1 Tablet by mouth nightly. 8/26/21   Ash Bailey MD     No Known Allergies   No family history on file.      SOCIAL HISTORY:  Patient resides:  Independently    Assisted Living    SNF    With family care x      Smoking history:   None x   Former    Chronic      Alcohol history:   None x   Social    Chronic      Ambulates:   Independently x   w/cane    w/walker    w/wc    CODE STATUS:  DNR    Full x   Other      Objective:     Physical Exam:     Visit Vitals  BP (!) 144/76 (BP 1 Location: Right lower arm, BP Patient Position: At rest)   Pulse 96   Temp 99 °F (37.2 °C)   Resp 21   Ht 5' 7\" (1.702 m)   Wt 134.3 kg (296 lb)   SpO2 96%   BMI 46.36 kg/m²    O2 Flow Rate (L/min): 2 l/min O2 Device: Nasal cannula    General:  Alert, cooperative, no distress, appears stated age. Head:  Normocephalic, without obvious abnormality, atraumatic. Eyes:  Conjunctivae/corneas clear. PERRL, EOMs intact. Nose: Nares normal. Septum midline. Mucosa normal. No drainage or sinus tenderness. Throat: Lips, mucosa, and tongue normal. Teeth and gums normal.   Neck: Supple, symmetrical, trachea midline, no adenopathy, thyroid: no enlargement/tenderness/nodules, no carotid bruit and no JVD. Back:   Symmetric, no curvature. ROM normal. No CVA tenderness. Lungs:   Clear to auscultation bilaterally. Chest wall:  No tenderness or deformity. Heart:  Regular rate and rhythm, S1, S2 normal, no murmur, click, rub or gallop. Abdomen:   Soft, non-tender. Bowel sounds normal. No masses,  No organomegaly. Extremities: Extremities normal, atraumatic, no cyanosis or edema. Pulses: 2+ and symmetric all extremities. Skin: Skin color, texture, turgor normal. No rashes or lesions   Neurologic: CNII-XII intact. No motor or sensory deficits. EKG:  normal EKG, normal sinus rhythm, unchanged from previous tracings, nonspecific ST and T waves changes.       Data Review:     Recent Days:  Recent Labs     09/24/21 2229   WBC 5.1   HGB 12.3   HCT 37.7        Recent Labs     09/24/21 2229      K 4.1      CO2 27   *   BUN 10   CREA 0.79   CA 8.5   ALB 2.9*   TBILI 0.7   ALT 25     No results for input(s): PH, PCO2, PO2, HCO3, FIO2 in the last 72 hours. 24 Hour Results:  Recent Results (from the past 24 hour(s))   CBC WITH AUTOMATED DIFF    Collection Time: 09/24/21 10:29 PM   Result Value Ref Range    WBC 5.1 3.6 - 11.0 K/uL    RBC 4.24 3.80 - 5.20 M/uL    HGB 12.3 11.5 - 16.0 g/dL    HCT 37.7 35.0 - 47.0 %    MCV 88.9 80.0 - 99.0 FL    MCH 29.0 26.0 - 34.0 PG    MCHC 32.6 30.0 - 36.5 g/dL    RDW 14.2 11.5 - 14.5 %    PLATELET 064 013 - 648 K/uL    MPV 10.4 8.9 - 12.9 FL    NRBC 0.4 (H) 0.0  WBC    ABSOLUTE NRBC 0.02 (H) 0.00 - 0.01 K/uL    NEUTROPHILS 67 32 - 75 %    LYMPHOCYTES 20 12 - 49 %    MONOCYTES 9 5 - 13 %    EOSINOPHILS 2 0 - 7 %    BASOPHILS 0 0 - 1 %    IMMATURE GRANULOCYTES 2 (H) 0 - 0.5 %    ABS. NEUTROPHILS 3.5 1.8 - 8.0 K/UL    ABS. LYMPHOCYTES 1.0 0.8 - 3.5 K/UL    ABS. MONOCYTES 0.4 0.0 - 1.0 K/UL    ABS. EOSINOPHILS 0.1 0.0 - 0.4 K/UL    ABS. BASOPHILS 0.0 0.0 - 0.1 K/UL    ABS. IMM. GRANS. 0.1 (H) 0.00 - 0.04 K/UL    DF AUTOMATED     METABOLIC PANEL, COMPREHENSIVE    Collection Time: 09/24/21 10:29 PM   Result Value Ref Range    Sodium 136 136 - 145 mmol/L    Potassium 4.1 3.5 - 5.1 mmol/L    Chloride 101 97 - 108 mmol/L    CO2 27 21 - 32 mmol/L    Anion gap 8 5 - 15 mmol/L    Glucose 197 (H) 65 - 100 mg/dL    BUN 10 6 - 20 mg/dL    Creatinine 0.79 0.55 - 1.02 mg/dL    BUN/Creatinine ratio 13 12 - 20      GFR est AA >60 >60 ml/min/1.73m2    GFR est non-AA >60 >60 ml/min/1.73m2    Calcium 8.5 8.5 - 10.1 mg/dL    Bilirubin, total 0.7 0.2 - 1.0 mg/dL    AST (SGOT) 16 15 - 37 U/L    ALT (SGPT) 25 12 - 78 U/L    Alk.  phosphatase 59 45 - 117 U/L    Protein, total 5.9 (L) 6.4 - 8.2 g/dL    Albumin 2.9 (L) 3.5 - 5.0 g/dL    Globulin 3.0 2.0 - 4.0 g/dL    A-G Ratio 1.0 (L) 1.1 - 2.2     TROPONIN I    Collection Time: 09/24/21 10:29 PM   Result Value Ref Range    Troponin-I, Qt. <0.05 <0.05 ng/mL   BNP    Collection Time: 09/24/21 10:29 PM   Result Value Ref Range    NT pro- (H) <125 pg/mL Imaging:   CTA CHEST W OR W WO CONT   Final Result   Commonly reported imaging features of Covid 19 pneumonia are   present. Other processes such as influenza pneumonia and organizing pneumonia,   as can be seen with connective tissue diseases and drug toxicity can cause a   similar imaging pattern. Overall appearance of the chest has improved since the patient's prior exam. No   Pulmonary embolism or acute aortic abnormalities identified. Probable   hepatosplenomegaly with fatty infiltration of the liver noted, correlate with   liver function examinations. Cholelithiasis. XR CHEST PORT   Final Result   Findings/IMPRESSION:      The cardiac silhouette is unchanged in size. Heterogeneous airspace opacities throughout both lungs which have overall   improved since 8/22/2021. No large pleural effusion. No discernible   pneumothorax. Assessment:     Recent diagnosis of Covid  Chronic hypoxic respiratory failure on home oxygen  Morbid obesity        Plan:     Patient seen and evaluated in the ED. No evidence of respiratory distress. Very comfortable on 2 L nasal oxygen. We will obtain arterial blood gas and prescriptions for oral Decadron and azithromycin have been sent to [address] [place] [address]. Patient is clinically stable for discharge with outpatient follow-up. Advised to increase oxygen at home up to 4 L if dyspnea worsens.   Thank you for involving us in the consultation of this patient        Signed By: Mary Atkinson MD     September 25, 2021

## 2021-09-25 NOTE — ROUTINE PROCESS
ABG attempted X 2, with no results produced. Notified Dr. Ceci Rosenberg. Verbal order given to cancel ABG order.

## 2021-10-27 ENCOUNTER — APPOINTMENT (OUTPATIENT)
Dept: CT IMAGING | Age: 66
DRG: 175 | End: 2021-10-27
Attending: EMERGENCY MEDICINE
Payer: MEDICARE

## 2021-10-27 ENCOUNTER — HOSPITAL ENCOUNTER (INPATIENT)
Age: 66
LOS: 3 days | Discharge: HOME | DRG: 175 | End: 2021-10-30
Attending: EMERGENCY MEDICINE | Admitting: INTERNAL MEDICINE
Payer: MEDICARE

## 2021-10-27 DIAGNOSIS — R77.8 ELEVATED TROPONIN: ICD-10-CM

## 2021-10-27 DIAGNOSIS — I21.4 NSTEMI (NON-ST ELEVATED MYOCARDIAL INFARCTION) (HCC): ICD-10-CM

## 2021-10-27 DIAGNOSIS — I26.99 BILATERAL PULMONARY EMBOLISM (HCC): Primary | ICD-10-CM

## 2021-10-27 PROBLEM — E66.01 MORBIDLY OBESE (HCC): Status: ACTIVE | Noted: 2021-10-27

## 2021-10-27 PROBLEM — R03.0 ELEVATED BP WITHOUT DIAGNOSIS OF HYPERTENSION: Status: ACTIVE | Noted: 2021-10-27

## 2021-10-27 PROBLEM — R73.09 ELEVATED GLUCOSE: Status: ACTIVE | Noted: 2021-10-27

## 2021-10-27 LAB
ALBUMIN SERPL-MCNC: 2.8 G/DL (ref 3.5–5)
ALBUMIN/GLOB SERPL: 0.9 {RATIO} (ref 1.1–2.2)
ALP SERPL-CCNC: 61 U/L (ref 45–117)
ALT SERPL-CCNC: 33 U/L (ref 12–78)
ANION GAP SERPL CALC-SCNC: 7 MMOL/L (ref 5–15)
APTT PPP: 22.9 SEC (ref 22.1–31)
APTT PPP: 41.7 SEC (ref 22.1–31)
AST SERPL-CCNC: 16 U/L (ref 15–37)
ATRIAL RATE: 122 BPM
BASOPHILS # BLD: 0 K/UL (ref 0–0.1)
BASOPHILS # BLD: 0 K/UL (ref 0–0.1)
BASOPHILS NFR BLD: 0 % (ref 0–1)
BASOPHILS NFR BLD: 0 % (ref 0–1)
BILIRUB SERPL-MCNC: 0.6 MG/DL (ref 0.2–1)
BUN SERPL-MCNC: 6 MG/DL (ref 6–20)
BUN/CREAT SERPL: 8 (ref 12–20)
CALCIUM SERPL-MCNC: 8.5 MG/DL (ref 8.5–10.1)
CALCULATED R AXIS, ECG10: -172 DEGREES
CALCULATED T AXIS, ECG11: 163 DEGREES
CHLORIDE SERPL-SCNC: 108 MMOL/L (ref 97–108)
CO2 SERPL-SCNC: 25 MMOL/L (ref 21–32)
CREAT SERPL-MCNC: 0.72 MG/DL (ref 0.55–1.02)
D DIMER PPP FEU-MCNC: 10.25 MG/L FEU (ref 0–0.65)
DIAGNOSIS, 93000: NORMAL
DIFFERENTIAL METHOD BLD: ABNORMAL
DIFFERENTIAL METHOD BLD: ABNORMAL
EOSINOPHIL # BLD: 0.1 K/UL (ref 0–0.4)
EOSINOPHIL # BLD: 0.2 K/UL (ref 0–0.4)
EOSINOPHIL NFR BLD: 3 % (ref 0–7)
EOSINOPHIL NFR BLD: 4 % (ref 0–7)
ERYTHROCYTE [DISTWIDTH] IN BLOOD BY AUTOMATED COUNT: 16.9 % (ref 11.5–14.5)
ERYTHROCYTE [DISTWIDTH] IN BLOOD BY AUTOMATED COUNT: 17.2 % (ref 11.5–14.5)
GLOBULIN SER CALC-MCNC: 3 G/DL (ref 2–4)
GLUCOSE BLD STRIP.AUTO-MCNC: 175 MG/DL (ref 65–117)
GLUCOSE SERPL-MCNC: 216 MG/DL (ref 65–100)
HCT VFR BLD AUTO: 35.1 % (ref 35–47)
HCT VFR BLD AUTO: 36.1 % (ref 35–47)
HGB BLD-MCNC: 11.6 G/DL (ref 11.5–16)
HGB BLD-MCNC: 12 G/DL (ref 11.5–16)
IMM GRANULOCYTES # BLD AUTO: 0 K/UL (ref 0–0.04)
IMM GRANULOCYTES # BLD AUTO: 0 K/UL (ref 0–0.04)
IMM GRANULOCYTES NFR BLD AUTO: 0 % (ref 0–0.5)
IMM GRANULOCYTES NFR BLD AUTO: 1 % (ref 0–0.5)
LYMPHOCYTES # BLD: 1.3 K/UL (ref 0.8–3.5)
LYMPHOCYTES # BLD: 2.2 K/UL (ref 0.8–3.5)
LYMPHOCYTES NFR BLD: 27 % (ref 12–49)
LYMPHOCYTES NFR BLD: 45 % (ref 12–49)
MCH RBC QN AUTO: 30.4 PG (ref 26–34)
MCH RBC QN AUTO: 30.8 PG (ref 26–34)
MCHC RBC AUTO-ENTMCNC: 33 G/DL (ref 30–36.5)
MCHC RBC AUTO-ENTMCNC: 33.2 G/DL (ref 30–36.5)
MCV RBC AUTO: 91.9 FL (ref 80–99)
MCV RBC AUTO: 92.8 FL (ref 80–99)
MONOCYTES # BLD: 0.5 K/UL (ref 0–1)
MONOCYTES # BLD: 0.5 K/UL (ref 0–1)
MONOCYTES NFR BLD: 10 % (ref 5–13)
MONOCYTES NFR BLD: 10 % (ref 5–13)
NEUTS SEG # BLD: 2 K/UL (ref 1.8–8)
NEUTS SEG # BLD: 2.9 K/UL (ref 1.8–8)
NEUTS SEG NFR BLD: 41 % (ref 32–75)
NEUTS SEG NFR BLD: 59 % (ref 32–75)
NRBC # BLD: 0 K/UL (ref 0–0.01)
NRBC # BLD: 0.02 K/UL (ref 0–0.01)
NRBC BLD-RTO: 0 PER 100 WBC
NRBC BLD-RTO: 0.4 PER 100 WBC
P-R INTERVAL, ECG05: 134 MS
PLATELET # BLD AUTO: 213 K/UL (ref 150–400)
PLATELET # BLD AUTO: 222 K/UL (ref 150–400)
PMV BLD AUTO: 10.1 FL (ref 8.9–12.9)
PMV BLD AUTO: 10.2 FL (ref 8.9–12.9)
POTASSIUM SERPL-SCNC: 4.1 MMOL/L (ref 3.5–5.1)
PROT SERPL-MCNC: 5.8 G/DL (ref 6.4–8.2)
Q-T INTERVAL, ECG07: 312 MS
QRS DURATION, ECG06: 76 MS
QTC CALCULATION (BEZET), ECG08: 444 MS
RBC # BLD AUTO: 3.82 M/UL (ref 3.8–5.2)
RBC # BLD AUTO: 3.89 M/UL (ref 3.8–5.2)
SERVICE CMNT-IMP: ABNORMAL
SODIUM SERPL-SCNC: 140 MMOL/L (ref 136–145)
THERAPEUTIC RANGE,PTTT: ABNORMAL SECS (ref 58–77)
THERAPEUTIC RANGE,PTTT: NORMAL SECS (ref 58–77)
TROPONIN-HIGH SENSITIVITY: 106 NG/L (ref 0–51)
TROPONIN-HIGH SENSITIVITY: 151 NG/L (ref 0–51)
VENTRICULAR RATE, ECG03: 122 BPM
WBC # BLD AUTO: 4.8 K/UL (ref 3.6–11)
WBC # BLD AUTO: 4.8 K/UL (ref 3.6–11)

## 2021-10-27 PROCEDURE — 84484 ASSAY OF TROPONIN QUANT: CPT

## 2021-10-27 PROCEDURE — 74011250636 HC RX REV CODE- 250/636: Performed by: EMERGENCY MEDICINE

## 2021-10-27 PROCEDURE — 74011000636 HC RX REV CODE- 636: Performed by: RADIOLOGY

## 2021-10-27 PROCEDURE — 85730 THROMBOPLASTIN TIME PARTIAL: CPT

## 2021-10-27 PROCEDURE — 99283 EMERGENCY DEPT VISIT LOW MDM: CPT

## 2021-10-27 PROCEDURE — 74011250637 HC RX REV CODE- 250/637: Performed by: INTERNAL MEDICINE

## 2021-10-27 PROCEDURE — 80053 COMPREHEN METABOLIC PANEL: CPT

## 2021-10-27 PROCEDURE — 80061 LIPID PANEL: CPT

## 2021-10-27 PROCEDURE — 93005 ELECTROCARDIOGRAM TRACING: CPT

## 2021-10-27 PROCEDURE — 65660000000 HC RM CCU STEPDOWN

## 2021-10-27 PROCEDURE — 83036 HEMOGLOBIN GLYCOSYLATED A1C: CPT

## 2021-10-27 PROCEDURE — 36415 COLL VENOUS BLD VENIPUNCTURE: CPT

## 2021-10-27 PROCEDURE — 85025 COMPLETE CBC W/AUTO DIFF WBC: CPT

## 2021-10-27 PROCEDURE — 74011250636 HC RX REV CODE- 250/636: Performed by: INTERNAL MEDICINE

## 2021-10-27 PROCEDURE — 85379 FIBRIN DEGRADATION QUANT: CPT

## 2021-10-27 PROCEDURE — 82962 GLUCOSE BLOOD TEST: CPT

## 2021-10-27 PROCEDURE — 96374 THER/PROPH/DIAG INJ IV PUSH: CPT

## 2021-10-27 PROCEDURE — 71275 CT ANGIOGRAPHY CHEST: CPT

## 2021-10-27 RX ORDER — SODIUM CHLORIDE 0.9 % (FLUSH) 0.9 %
5-40 SYRINGE (ML) INJECTION EVERY 8 HOURS
Status: DISCONTINUED | OUTPATIENT
Start: 2021-10-27 | End: 2021-10-30 | Stop reason: HOSPADM

## 2021-10-27 RX ORDER — INSULIN LISPRO 100 [IU]/ML
INJECTION, SOLUTION INTRAVENOUS; SUBCUTANEOUS
Status: DISCONTINUED | OUTPATIENT
Start: 2021-10-27 | End: 2021-10-30 | Stop reason: HOSPADM

## 2021-10-27 RX ORDER — MAGNESIUM SULFATE 100 %
4 CRYSTALS MISCELLANEOUS AS NEEDED
Status: DISCONTINUED | OUTPATIENT
Start: 2021-10-27 | End: 2021-10-30 | Stop reason: HOSPADM

## 2021-10-27 RX ORDER — HYDRALAZINE HYDROCHLORIDE 20 MG/ML
10 INJECTION INTRAMUSCULAR; INTRAVENOUS
Status: DISCONTINUED | OUTPATIENT
Start: 2021-10-27 | End: 2021-10-30 | Stop reason: HOSPADM

## 2021-10-27 RX ORDER — HEPARIN SODIUM 1000 [USP'U]/ML
5000 INJECTION, SOLUTION INTRAVENOUS; SUBCUTANEOUS ONCE
Status: COMPLETED | OUTPATIENT
Start: 2021-10-28 | End: 2021-10-27

## 2021-10-27 RX ORDER — DEXTROSE 50 % IN WATER (D50W) INTRAVENOUS SYRINGE
12.5-25 AS NEEDED
Status: DISCONTINUED | OUTPATIENT
Start: 2021-10-27 | End: 2021-10-30 | Stop reason: HOSPADM

## 2021-10-27 RX ORDER — SODIUM CHLORIDE 0.9 % (FLUSH) 0.9 %
5-40 SYRINGE (ML) INJECTION AS NEEDED
Status: DISCONTINUED | OUTPATIENT
Start: 2021-10-27 | End: 2021-10-30 | Stop reason: HOSPADM

## 2021-10-27 RX ORDER — ACETAMINOPHEN 325 MG/1
650 TABLET ORAL
Status: DISCONTINUED | OUTPATIENT
Start: 2021-10-27 | End: 2021-10-30 | Stop reason: HOSPADM

## 2021-10-27 RX ORDER — ONDANSETRON 2 MG/ML
4 INJECTION INTRAMUSCULAR; INTRAVENOUS
Status: DISCONTINUED | OUTPATIENT
Start: 2021-10-27 | End: 2021-10-30 | Stop reason: HOSPADM

## 2021-10-27 RX ORDER — NALOXONE HYDROCHLORIDE 0.4 MG/ML
0.4 INJECTION, SOLUTION INTRAMUSCULAR; INTRAVENOUS; SUBCUTANEOUS AS NEEDED
Status: DISCONTINUED | OUTPATIENT
Start: 2021-10-27 | End: 2021-10-30 | Stop reason: HOSPADM

## 2021-10-27 RX ORDER — HEPARIN SODIUM 1000 [USP'U]/ML
10000 INJECTION, SOLUTION INTRAVENOUS; SUBCUTANEOUS ONCE
Status: COMPLETED | OUTPATIENT
Start: 2021-10-27 | End: 2021-10-27

## 2021-10-27 RX ORDER — BUTALBITAL, ACETAMINOPHEN AND CAFFEINE 300; 40; 50 MG/1; MG/1; MG/1
1 CAPSULE ORAL
Qty: 11 CAPSULE | Refills: 0 | Status: SHIPPED | OUTPATIENT
Start: 2021-10-27 | End: 2021-10-27 | Stop reason: CLARIF

## 2021-10-27 RX ORDER — HYDROCODONE BITARTRATE AND ACETAMINOPHEN 5; 325 MG/1; MG/1
1 TABLET ORAL
Status: DISCONTINUED | OUTPATIENT
Start: 2021-10-27 | End: 2021-10-30 | Stop reason: HOSPADM

## 2021-10-27 RX ORDER — HEPARIN SODIUM 10000 [USP'U]/100ML
11-36 INJECTION, SOLUTION INTRAVENOUS
Status: DISCONTINUED | OUTPATIENT
Start: 2021-10-27 | End: 2021-10-30

## 2021-10-27 RX ORDER — GUAIFENESIN 100 MG/5ML
81 LIQUID (ML) ORAL DAILY
Status: DISCONTINUED | OUTPATIENT
Start: 2021-10-27 | End: 2021-10-30 | Stop reason: HOSPADM

## 2021-10-27 RX ORDER — MORPHINE SULFATE 2 MG/ML
2 INJECTION, SOLUTION INTRAMUSCULAR; INTRAVENOUS
Status: DISCONTINUED | OUTPATIENT
Start: 2021-10-27 | End: 2021-10-30 | Stop reason: HOSPADM

## 2021-10-27 RX ADMIN — ASPIRIN 81 MG: 81 TABLET, CHEWABLE ORAL at 21:50

## 2021-10-27 RX ADMIN — IOPAMIDOL 80 ML: 755 INJECTION, SOLUTION INTRAVENOUS at 15:48

## 2021-10-27 RX ADMIN — HEPARIN SODIUM 11 UNITS/KG/HR: 10000 INJECTION, SOLUTION INTRAVENOUS at 16:30

## 2021-10-27 RX ADMIN — HEPARIN SODIUM 5000 UNITS: 1000 INJECTION, SOLUTION INTRAVENOUS; SUBCUTANEOUS at 23:24

## 2021-10-27 RX ADMIN — HEPARIN SODIUM 10000 UNITS: 1000 INJECTION INTRAVENOUS; SUBCUTANEOUS at 16:30

## 2021-10-27 RX ADMIN — Medication 10 ML: at 21:50

## 2021-10-27 NOTE — ED TRIAGE NOTES
Patient reports SOB started today. Denies CP/N/V/D. Patient is 92% on 3L via NC. Patient wears 2L at baseline since Covid. Patient 90% on RA.

## 2021-10-27 NOTE — H&P
[facility]  [address], [facility] [address]  [phone]    Admission History and Physical      NAME:  Chad Price   :   1955   MRN:  [id number]     PCP:  None     Date/Time:  10/27/2021         Subjective:     CHIEF COMPLAINT: \"I was so short of breath I knew something was wrong\"     HISTORY OF PRESENT ILLNESS:     Ms. Kylah Navas is a 77 y.o.  female with PMH of morbid obesity, [facility]. Pt reports she was slightly SOB the last few days but today was profoundly SOB. She noted that her HR was up and her O2 sats were low prompting her to come to the ED. Of note, pt did have a DVT 16 years ago and was on coumadin during that time. PMH   Prior h/o DVT   Hospitalization in Aug for [facility]     No past surgical history on file. Social History     Tobacco Use    Smoking status: Never Smoker    Smokeless tobacco: Never Used   Substance Use Topics    Alcohol use: Not Currently      FH:   HTN     No Known Allergies     Prior to Admission medications    Medication Sig Start Date End Date Taking? Authorizing Provider   zinc 50 mg tab tablet Take 50 mg by mouth daily.    Yes Provider, Historical         Review of Systems:  (bold if positive, if negative)    Gen:  Eyes:  ENT:  CVS:  Pulm:  GI:    :    MS:  Skin:  Psych:  Endo:    Hem:  Renal:    Neuro:     Dyspnea        Objective:      VITALS:    Vital signs reviewed; most recent are:    Visit Vitals  BP (!) 153/97   Pulse (!) 122   Temp 97.9 °F (36.6 °C)   Resp 28   Ht 5' 7\" (1.702 m)   Wt 134.7 kg (297 lb)   SpO2 95%   BMI 46.52 kg/m²     SpO2 Readings from Last 6 Encounters:   10/27/21 95%   21 94%   21 92%    O2 Flow Rate (L/min): 2 l/min   No intake or output data in the 24 hours ending 10/27/21 1926         Exam:     Physical Exam:    Gen: Slightly increased WOB   HEENT:  Pink conjunctivae, PERRL, hearing intact to voice, moist mucous membranes  Neck:  Supple, without masses, thyroid non-tender  Resp:  No accessory muscle use, clear breath sounds without wheezes rales or rhonchi  Card: Tachycardic. Abd:  Soft, non-tender, non-distended, normoactive bowel sounds are present, no palpable organomegaly  Lymph:  No cervical adenopathy  Musc:  No cyanosis or clubbing  Skin:  No rashes or ulcers, skin turgor is good  Neuro:  Cranial nerves 3-12 are grossly intact,  strength is 5/5 bilaterally, dorsi / plantarflexion strength is 5/5 bilaterally, follows commands appropriately  Psych:  Alert with good insight. Oriented to person, place, and time       Labs:    Recent Labs     10/27/21  1501   WBC 4.8   HGB 12.0   HCT 36.1        Recent Labs     10/27/21  1501      K 4.1      CO2 25   *   BUN 6   CREA 0.72   CA 8.5   ALB 2.8*   ALT 33     No components found for: GLPOC  No results for input(s): PH, PCO2, PO2, HCO3, FIO2 in the last 72 hours. No results for input(s): INR, INREXT in the last 72 hours. CTA chest =>   IMPRESSION  Moderate pulmonary emboli on the right and on the left with minimal  evidence of right ventricular strain. No pericardial effusion.     Hepatic steatosis is severe. Improved findings related to recent Covid pneumonia. There are chronic  parenchymal changes demonstrated. Assessment/Plan:     Pulmonary embolism - likely provoked by recent COVID infection (in August) and suspect may have a hypercoagulable disorder.  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 therefore will need lifelong anticoagulation   -on heparin gtt for now   -will likely need DOAC at discharge; E-Rx sent to pt's pharmacy for eliquis to price out/determine coverage   -eval for hypoxia prior to d/c     Elevated BP - not on outpt BP meds   -monitor; may need initiation of BP meds     NSTEMI - likely heart strain from #1   -trend CE's   -check TTE   -start low dose ASA   -check A1c, lipid panel   -cards eval though unlikely would stress at this point; defer to cardiology whether need to continue ASA and DOAC at discharge     Elevated BG - no known h/o DM   -check A1c   -ISS   -BG checks AC TID and qHS    Morbid obesity - likely driving elevated BP, BG, etc   -weight loss     Surrogate decision maker:      Total time spent with patient: [address] discussed with: Patient, Family and >50% of time spent in counseling and coordination of care    Discussed:  Care Plan    Prophylaxis:  Heparin gtt    Probable Disposition:  Home w/Family           ___________________________________________________    Attending Physician: Naima Lopez MD

## 2021-10-27 NOTE — PROGRESS NOTES
[facility]: MED RECONCILIATION    Prior to Admission Medications   Prescriptions Last Dose Informant Patient Reported? Taking?   zinc 50 mg tab tablet 10/26/2021 at Unknown time Self Yes Yes   Sig: Take 50 mg by mouth daily.         May Me, Pharmacist

## 2021-10-27 NOTE — ED PROVIDER NOTES
57-year-old female with history of Covid several months ago and subsequent oxygen requirement since that time and history of remote PE presents to the emergency department with chief complaint of worsening shortness of breath without chest pain. She notes that for the past couple of days she has had exertional tachycardia and worsening shortness of breath. She has been on 2 L/min of oxygen therapy since discharge from the hospital in August but turned it up to 3 L today. No new fevers or myalgias. She is not vaccinated. Recently completed second course of steroids and antibiotics, completing 2 weeks ago. The history is provided by the patient and medical records. Shortness of Breath  This is a recurrent problem. The current episode started yesterday. The problem has been rapidly worsening. Associated symptoms include cough. Pertinent negatives include no fever, no headaches, no sore throat, no chest pain, no vomiting, no abdominal pain, no rash and no leg swelling. She has tried oral steroids for the symptoms. Associated medical issues include PE. Associated medical issues comments: Covid. No past medical history on file. No past surgical history on file. No family history on file.     Social History     Socioeconomic History    Marital status:      Spouse name: Not on file    Number of children: Not on file    Years of education: Not on file    Highest education level: Not on file   Occupational History    Not on file   Tobacco Use    Smoking status: Never Smoker    Smokeless tobacco: Never Used   Substance and Sexual Activity    Alcohol use: Not Currently    Drug use: Never    Sexual activity: Not on file   Other Topics Concern    Not on file   Social History Narrative    Not on file     Social Determinants of Health     Financial Resource Strain:     Difficulty of Paying Living Expenses:    Food Insecurity:     Worried About Running Out of Food in the Last Year:     Ran Out of Food in the Last Year:    Transportation Needs:     Lack of Transportation (Medical):  Lack of Transportation (Non-Medical):    Physical Activity:     Days of Exercise per Week:     Minutes of Exercise per Session:    Stress:     Feeling of Stress :    Social Connections:     Frequency of Communication with Friends and Family:     Frequency of Social Gatherings with Friends and Family:     Attends Restorationist Services:     Active Member of Clubs or Organizations:     Attends Club or Organization Meetings:     Marital Status:    Intimate Partner Violence:     Fear of Current or Ex-Partner:     Emotionally Abused:     Physically Abused:     Sexually Abused: ALLERGIES: Patient has no known allergies. Review of Systems   Constitutional: Negative for fatigue and fever. HENT: Negative for sneezing and sore throat. Respiratory: Positive for cough and shortness of breath. Cardiovascular: Positive for palpitations. Negative for chest pain and leg swelling. Gastrointestinal: Negative for abdominal pain, diarrhea, nausea and vomiting. Genitourinary: Negative for difficulty urinating and dysuria. Musculoskeletal: Negative for arthralgias and myalgias. Skin: Negative for color change and rash. Neurological: Negative for weakness and headaches. Psychiatric/Behavioral: Negative for agitation and behavioral problems. Vitals:    10/27/21 1422 10/27/21 1424   BP: (!) 171/86    Pulse: (!) 125    Resp: 23    Temp:  97.1 °F (36.2 °C)   SpO2: 90%    Weight: 134.7 kg (297 lb)    Height: 5' 7\" (1.702 m)             Physical Exam  Vitals and nursing note reviewed. Constitutional:       General: She is not in acute distress. Appearance: She is well-developed. She is obese. She is ill-appearing. She is not toxic-appearing or diaphoretic. HENT:      Head: Normocephalic and atraumatic.       Nose: Nose normal.      Mouth/Throat:      Mouth: Mucous membranes are moist. Pharynx: Oropharynx is clear. Eyes:      Extraocular Movements: Extraocular movements intact. Conjunctiva/sclera: Conjunctivae normal.      Pupils: Pupils are equal, round, and reactive to light. Cardiovascular:      Rate and Rhythm: Regular rhythm. Tachycardia present. Pulses: Normal pulses. Heart sounds: Normal heart sounds. Pulmonary:      Effort: Pulmonary effort is normal. No respiratory distress. Breath sounds: Normal breath sounds. No wheezing. Chest:      Chest wall: No tenderness. Abdominal:      General: Abdomen is flat. There is no distension. Palpations: Abdomen is soft. Tenderness: There is no abdominal tenderness. There is no guarding or rebound. Musculoskeletal:         General: No swelling, tenderness, deformity or signs of injury. Normal range of motion. Cervical back: Normal range of motion and neck supple. No rigidity. No muscular tenderness. Right lower leg: No edema. Left lower leg: No edema. Skin:     General: Skin is warm and dry. Capillary Refill: Capillary refill takes less than 2 seconds. Neurological:      General: No focal deficit present. Mental Status: She is alert and oriented to person, place, and time. Psychiatric:         Mood and Affect: Mood normal.         Behavior: Behavior normal.          MDM  Number of Diagnoses or Management Options  Diagnosis management comments: 61-year-old female presents as above with bilateral PE and troponin leak. Plan to admit to the hospital for further management. Intensivist consult placed given troponin leak. Amount and/or Complexity of Data Reviewed  Clinical lab tests: reviewed  Tests in the radiology section of CPT®: reviewed  Tests in the medicine section of CPT®: reviewed      ED Course as of Oct 27 1640   Wed Oct 27, 2021   1607 My wet read of the CT scan is concerning for bilateral PE in multiple branchs of the pulmonary arteries without saddle embolus.     [JM] ED Course User Index  [JM] Lexie Alston MD       Procedures               Perfect Serve Consult for Admission  4:41 PM    ED Room Number: BX38/74  Patient Name and age:  Brain Scot 77 y.o.  female  Working Diagnosis:   1. Bilateral pulmonary embolism ([facility]    2. NSTEMI (non-ST elevated myocardial infarction) ([facility] [address]        COVID-19 Suspicion:  no  Sepsis present:  no  Reassessment needed: N/A  Code Status:  Full Code  Readmission: no  Isolation Requirements:  no  Recommended Level of Care:  step down  Department:[facility] ED - [phone]  Other: Bilateral PE, patient has had mild hypoxia ever since Covid diagnosis (august) but recently uptitrated oxygen, currently on 3 to 4 L/min. Elevated troponin. I placed intensivist consult.

## 2021-10-27 NOTE — PROGRESS NOTES
Shift Change:    Bedside and Verbal shift change report given to Christel (oncoming nurse) by Pop Valentine (offgoing nurse). Report included the following information SBAR, Kardex, and Recent Results. 2258: Critical troponin of 151. Notified Dr. James Bradford. This patient was assisted with Intentional Toileting every 2 hours during this shift. Documentation of ambulation and output reflected on Flowsheet. Shift Report:    Bedside and Verbal shift change report given to [facility] (oncoming nurse) by Dax Bai (offgoing nurse). Report included the following information SBAR, Kardex, and Recent Results.

## 2021-10-28 LAB
ANION GAP SERPL CALC-SCNC: 4 MMOL/L (ref 5–15)
APTT PPP: 46.1 SEC (ref 22.1–31)
APTT PPP: 57.9 SEC (ref 22.1–31)
BASOPHILS # BLD: 0 K/UL (ref 0–0.1)
BASOPHILS NFR BLD: 0 % (ref 0–1)
BUN SERPL-MCNC: 6 MG/DL (ref 6–20)
BUN/CREAT SERPL: 8 (ref 12–20)
CALCIUM SERPL-MCNC: 7.9 MG/DL (ref 8.5–10.1)
CHLORIDE SERPL-SCNC: 109 MMOL/L (ref 97–108)
CHOLEST SERPL-MCNC: 182 MG/DL
CO2 SERPL-SCNC: 28 MMOL/L (ref 21–32)
COMMENT, HOLDF: NORMAL
CREAT SERPL-MCNC: 0.74 MG/DL (ref 0.55–1.02)
DIFFERENTIAL METHOD BLD: ABNORMAL
EOSINOPHIL # BLD: 0.2 K/UL (ref 0–0.4)
EOSINOPHIL NFR BLD: 4 % (ref 0–7)
ERYTHROCYTE [DISTWIDTH] IN BLOOD BY AUTOMATED COUNT: 17.2 % (ref 11.5–14.5)
EST. AVERAGE GLUCOSE BLD GHB EST-MCNC: 189 MG/DL
GLUCOSE BLD STRIP.AUTO-MCNC: 150 MG/DL (ref 65–117)
GLUCOSE BLD STRIP.AUTO-MCNC: 161 MG/DL (ref 65–117)
GLUCOSE BLD STRIP.AUTO-MCNC: 192 MG/DL (ref 65–117)
GLUCOSE BLD STRIP.AUTO-MCNC: 211 MG/DL (ref 65–117)
GLUCOSE SERPL-MCNC: 156 MG/DL (ref 65–100)
HBA1C MFR BLD: 8.2 % (ref 4–5.6)
HCT VFR BLD AUTO: 34.9 % (ref 35–47)
HDLC SERPL-MCNC: 48 MG/DL
HDLC SERPL: 3.8 {RATIO} (ref 0–5)
HGB BLD-MCNC: 11.4 G/DL (ref 11.5–16)
IMM GRANULOCYTES # BLD AUTO: 0 K/UL
IMM GRANULOCYTES NFR BLD AUTO: 0 %
LDLC SERPL CALC-MCNC: 81.8 MG/DL (ref 0–100)
LYMPHOCYTES # BLD: 1.6 K/UL (ref 0.8–3.5)
LYMPHOCYTES NFR BLD: 40 % (ref 12–49)
MAGNESIUM SERPL-MCNC: 2 MG/DL (ref 1.6–2.4)
MCH RBC QN AUTO: 30.1 PG (ref 26–34)
MCHC RBC AUTO-ENTMCNC: 32.7 G/DL (ref 30–36.5)
MCV RBC AUTO: 92.1 FL (ref 80–99)
METAMYELOCYTES NFR BLD MANUAL: 1 %
MONOCYTES # BLD: 0.5 K/UL (ref 0–1)
MONOCYTES NFR BLD: 12 % (ref 5–13)
NEUTS BAND NFR BLD MANUAL: 2 % (ref 0–6)
NEUTS SEG # BLD: 1.8 K/UL (ref 1.8–8)
NEUTS SEG NFR BLD: 41 % (ref 32–75)
NRBC # BLD: 0.02 K/UL (ref 0–0.01)
NRBC BLD-RTO: 0.5 PER 100 WBC
PLATELET # BLD AUTO: 211 K/UL (ref 150–400)
PMV BLD AUTO: 10.1 FL (ref 8.9–12.9)
POTASSIUM SERPL-SCNC: 4.2 MMOL/L (ref 3.5–5.1)
RBC # BLD AUTO: 3.79 M/UL (ref 3.8–5.2)
RBC MORPH BLD: ABNORMAL
SAMPLES BEING HELD,HOLD: NORMAL
SERVICE CMNT-IMP: ABNORMAL
SODIUM SERPL-SCNC: 141 MMOL/L (ref 136–145)
THERAPEUTIC RANGE,PTTT: ABNORMAL SECS (ref 58–77)
THERAPEUTIC RANGE,PTTT: ABNORMAL SECS (ref 58–77)
TRIGL SERPL-MCNC: 261 MG/DL (ref ?–150)
TROPONIN-HIGH SENSITIVITY: 58 NG/L (ref 0–51)
TROPONIN-HIGH SENSITIVITY: 89 NG/L (ref 0–51)
VLDLC SERPL CALC-MCNC: 52.2 MG/DL
WBC # BLD AUTO: 4.1 K/UL (ref 3.6–11)

## 2021-10-28 PROCEDURE — 65660000000 HC RM CCU STEPDOWN

## 2021-10-28 PROCEDURE — 85025 COMPLETE CBC W/AUTO DIFF WBC: CPT

## 2021-10-28 PROCEDURE — 80048 BASIC METABOLIC PNL TOTAL CA: CPT

## 2021-10-28 PROCEDURE — 99222 1ST HOSP IP/OBS MODERATE 55: CPT | Performed by: SPECIALIST

## 2021-10-28 PROCEDURE — 84484 ASSAY OF TROPONIN QUANT: CPT

## 2021-10-28 PROCEDURE — 85730 THROMBOPLASTIN TIME PARTIAL: CPT

## 2021-10-28 PROCEDURE — 74011636637 HC RX REV CODE- 636/637: Performed by: INTERNAL MEDICINE

## 2021-10-28 PROCEDURE — 74011250636 HC RX REV CODE- 250/636: Performed by: EMERGENCY MEDICINE

## 2021-10-28 PROCEDURE — 82962 GLUCOSE BLOOD TEST: CPT

## 2021-10-28 PROCEDURE — 83735 ASSAY OF MAGNESIUM: CPT

## 2021-10-28 PROCEDURE — 36415 COLL VENOUS BLD VENIPUNCTURE: CPT

## 2021-10-28 PROCEDURE — 74011250636 HC RX REV CODE- 250/636: Performed by: INTERNAL MEDICINE

## 2021-10-28 RX ORDER — HEPARIN SODIUM 1000 [USP'U]/ML
5000 INJECTION, SOLUTION INTRAVENOUS; SUBCUTANEOUS ONCE
Status: COMPLETED | OUTPATIENT
Start: 2021-10-28 | End: 2021-10-28

## 2021-10-28 RX ORDER — HEPARIN SODIUM 1000 [USP'U]/ML
40 INJECTION, SOLUTION INTRAVENOUS; SUBCUTANEOUS ONCE
Status: DISCONTINUED | OUTPATIENT
Start: 2021-10-28 | End: 2021-10-28

## 2021-10-28 RX ADMIN — INSULIN LISPRO 2 UNITS: 100 INJECTION, SOLUTION INTRAVENOUS; SUBCUTANEOUS at 21:10

## 2021-10-28 RX ADMIN — Medication 10 ML: at 14:21

## 2021-10-28 RX ADMIN — HEPARIN SODIUM 16 UNITS/KG/HR: 10000 INJECTION, SOLUTION INTRAVENOUS at 19:55

## 2021-10-28 RX ADMIN — INSULIN LISPRO 2 UNITS: 100 INJECTION, SOLUTION INTRAVENOUS; SUBCUTANEOUS at 11:51

## 2021-10-28 RX ADMIN — INSULIN LISPRO 2 UNITS: 100 INJECTION, SOLUTION INTRAVENOUS; SUBCUTANEOUS at 08:54

## 2021-10-28 RX ADMIN — INSULIN LISPRO 2 UNITS: 100 INJECTION, SOLUTION INTRAVENOUS; SUBCUTANEOUS at 16:34

## 2021-10-28 RX ADMIN — Medication 10 ML: at 21:10

## 2021-10-28 RX ADMIN — HEPARIN SODIUM 13 UNITS/KG/HR: 10000 INJECTION, SOLUTION INTRAVENOUS at 06:17

## 2021-10-28 RX ADMIN — HEPARIN SODIUM 5000 UNITS: 1000 INJECTION INTRAVENOUS; SUBCUTANEOUS at 14:17

## 2021-10-28 RX ADMIN — HEPARIN SODIUM 16 UNITS/KG/HR: 10000 INJECTION, SOLUTION INTRAVENOUS at 14:16

## 2021-10-28 RX ADMIN — Medication 10 ML: at 06:16

## 2021-10-28 NOTE — PROGRESS NOTES
0700: Bedside shift change report given to [address] [place] [address] (oncoming nurse) by Nadira Em (offgoing nurse). Report included the following information SBAR, Kardex, Procedure Summary, Intake/Output, MAR, Accordion and Cardiac Rhythm Sinus tach. This patient was assisted with Intentional Toileting every 2 hours during this shift as appropriate. Documentation of ambulation and output reflected on Flowsheet as appropriate. Purposeful hourly rounding was completed using AIDET and 5Ps. Outcomes of PHR documented as they occurred. Bed alarm in use as appropriate. Dual Suction and ambubag in place. 1850: Patients IV beeping, RN not notified. Patient states that it has been beeping for about 30 minutes and RN was not informed. IV heparin restarted. Pharmacy notified. Orders to draw aPTT from restart of heparin gtt at 1850, not original timing. Per Pharmacy, next aPTT due at 0050.

## 2021-10-28 NOTE — PROGRESS NOTES
Physician Progress Note      Catarino Moore  CSN #:                  [id number]  :                       1955  ADMIT DATE:       10/27/2021 2:25 PM  [address] [place] DATE:  RESPONDING  PROVIDER #:        Kurt Chandler MD          QUERY Antoinette Buenrostroence Afternoon    This patient admitted for PE. The Cards consulted note on 10/28, \"the patient has been on 2 liters of o2 since COVID. Dx with COVID in Aug. 2021 and has been on o2 2 liters  The patient is currently on 3-4 liters o2    If possible, please document in progress notes and discharge summary further specificity regarding the type and acuity of respiratory failure: The medical record reflects the following:  Risk Factors: Recent covid infection and now on o2, HTN, hx of PE, morbid obesity  Clinical Indicators: resp --31-----Hr 122-128---02 sats 94% 3 liters, CT scan + PE,  Treatment: pulse oximetry,, 02 supplements, CT chest    Thank you  Bernabe Leach, [address] [place] [address]  Options provided:  -- Acute on chronic respiratory failure with hypoxia  -- Acute respiratory failure with hypoxia  -- Other - I will add my own diagnosis  -- Disagree - Not applicable / Not valid  -- Disagree - Clinically unable to determine / Unknown  -- Refer to Clinical Documentation Reviewer    PROVIDER RESPONSE TEXT:    This patient is in acute on chronic respiratory failure with hypoxia.     Query created by: Leni Chavarria on 10/28/2021 4:06 PM      Electronically signed by:  Kurt Chandler MD 10/28/2021 4:30 PM

## 2021-10-28 NOTE — PROGRESS NOTES
[facility]  [address] [facility], [place], [address] [facility]  [phone]      Medical Progress Note      NAME: Becky Faye   :  1955  MRM:  [id number]    Date of service: 10/28/2021  7:15 AM       Assessment and Plan:   1. Pulmonary emboli - likely provoked by recent COVID infection (in August) and suspect may have a hypercoagulable disorder. 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 On heparin gtt for now with a plan of switching to DOAC. eval for hypoxia prior to d/c      2. Elevated troponin. likely heart strain due to above. Trending down. Check TTE. Consult cardiology. Defer to cardiology whether need to continue ASA and DOAC at discharge    check A1c, lipid panel      3. Elevated BP - not on outpt BP meds. Now better controlled. 4.  Elevated BG - no known h/o DM. Check A1c. BG checks AC TID and qHS     5. Morbid obesity - likely driving elevated BP, BG, etc. Advised weight loss. Subjective:     Chief Complaint[de-identified] Patient was seen and examined as a follow up for PE. Chart was reviewed. feels weel. Denies chest pain. ROS:  (bold if positive, if negative)    Tolerating PT  Tolerating Diet        Objective:     Last 24hrs VS reviewed since prior progress note.  Most recent are:    Visit Vitals  /74 (BP 1 Location: Right upper arm, BP Patient Position: At rest)   Pulse 99   Temp 98 °F (36.7 °C)   Resp 26   Ht 5' 7\" (1.702 m)   Wt 134.7 kg (297 lb)   SpO2 98%   BMI 46.52 kg/m²     SpO2 Readings from Last 6 Encounters:   10/28/21 98%   21 94%   21 92%    O2 Flow Rate (L/min): 3 l/min       Intake/Output Summary (Last 24 hours) at 10/28/2021 0715  Last data filed at 10/28/2021 0704  Gross per 24 hour   Intake 480 ml   Output 800 ml   Net -320 ml        Physical Exam:    Gen:  obese, in no acute distress  HEENT:  Pink conjunctivae, PERRL, hearing intact to voice, moist mucous membranes  Neck:  Supple, without masses, thyroid non-tender  Resp:  No accessory muscle use, clear breath sounds without wheezes rales or rhonchi  Card:  No murmurs, normal S1, S2 without thrills, bruits or peripheral edema  Abd:  Soft, non-tender, non-distended, normoactive bowel sounds are present, no palpable organomegaly and no detectable hernias  Lymph:  No cervical or inguinal adenopathy  Musc:  No cyanosis or clubbing  Skin:  No rashes or ulcers, skin turgor is good  Neuro:  Cranial nerves are grossly intact, no focal motor weakness, follows commands appropriately  Psych:  Good insight, oriented to person, place and time, alert  __________________________________________________________________  Medications Reviewed: (see below)  Medications:     Current Facility-Administered Medications   Medication Dose Route Frequency    heparin 25,000 units in D5W 250 ml infusion  11-36 Units/kg/hr IntraVENous TITRATE    sodium chloride (NS) flush 5-40 mL  5-40 mL IntraVENous Q8H    sodium chloride (NS) flush 5-40 mL  5-40 mL IntraVENous PRN    acetaminophen (TYLENOL) tablet 650 mg  650 mg Oral Q4H PRN    HYDROcodone-acetaminophen (NORCO) 5-325 mg per tablet 1 Tablet  1 Tablet Oral Q4H PRN    morphine injection 2 mg  2 mg IntraVENous Q4H PRN    naloxone (NARCAN) injection 0.4 mg  0.4 mg IntraVENous PRN    ondansetron (ZOFRAN) injection 4 mg  4 mg IntraVENous Q4H PRN    hydrALAZINE (APRESOLINE) 20 mg/mL injection 10 mg  10 mg IntraVENous Q6H PRN    insulin lispro (HUMALOG) injection   SubCUTAneous AC&HS    glucose chewable tablet 16 g  4 Tablet Oral PRN    dextrose (D50W) injection syrg 12.5-25 g  12.5-25 g IntraVENous PRN    glucagon (GLUCAGEN) injection 1 mg  1 mg IntraMUSCular PRN    aspirin chewable tablet 81 mg  81 mg Oral DAILY        Lab Data Reviewed: (see below)  Lab Review:     Recent Labs     10/28/21  0540 10/27/21  2153 10/27/21  1501   WBC 4.1 4.8 4.8   HGB 11.4* 11.6 12.0   HCT 34.9* 35.1 36.1  222 213     Recent Labs     10/28/21  0540 10/27/21  1501    140   K 4.2 4.1   * 108   CO2 28 25   * 216*   BUN 6 6   CREA 0.74 0.72   CA 7.9* 8.5   MG 2.0  --    ALB  --  2.8*   TBILI  --  0.6   ALT  --  33     Lab Results   Component Value Date/Time    Glucose (POC) 175 (H) 10/27/2021 09:25 PM     No results for input(s): PH, PCO2, PO2, HCO3, FIO2 in the last 72 hours. No results for input(s): INR, INREXT in the last 72 hours. All Micro Results     Procedure Component Value Units Date/Time    COVID-19 RAPID TEST [[id number]]     Order Status: Canceled           I have reviewed notes of prior 24hr. Other pertinent lab: Total time spent with patient: 28 I personally reviewed chart, notes, data and current medications in the medical record. I have personally examined and treated the patient at bedside during this period.                  Care Plan discussed with: Patient, Nursing Staff and >50% of time spent in counseling and coordination of care    Discussed:  Care Plan    Prophylaxis:  Hep SQ    Disposition:  Home w/Family           ___________________________________________________    Attending Physician: Roxana Sprague MD

## 2021-10-28 NOTE — CONSULTS
CARDIOLOGY CONSULTATION NOTE    Klaus Motley MD,  [address], [place], [address] [facility]  Phone [phone]; Fax [phone]  Mobile [phone]   Voice Mail [phone]                               10/27/2021  2:25 PM  Primary Care None  :  1955   MRN:  [id number]     CC: Shortness of breath has been wearing 2 L since Covid    Reason for consult:  Elevated troponin      Admission Diagnosis: Pulmonary embolism ([address] [I26.99]         ATTENTION:   This medical record was transcribed using an electronic medical records/speech recognition system. Although proofread, it may and can contain electronic, spelling and other errors. Corrections may be executed at a later time. Please feel free to contact us for any clarifications as needed. Impression Plan/Recommendation   1. Elevated troponin in the setting of a pulmonary embolus most likely type II MI  2. Hypertension  3. History of pulmonary embolus on this admission and DVT in the past  4. Morbid obesity  5. Status post Covid infection on home O2        H&H is 11.4 and 43.9, potassium is 4.2, BUN is 6 creatinine is 0.74, troponin peaked at 151 and is now 89, hemoglobin A1c was 8.2, D-dimer was 10.25  CT of chest small to moderate pulmonary emboli on the right and on the left with minimal evidence of right ventricular strain  ECG: Sinus tachycardia question lead reversal based on ECG findings on 2021. 1.   Clearly elevated troponin is related to supply demand mismatch in the setting of hypoxia and a pulmonary embolus. 2. No additional cardiac testing is indicated  3. I think I would do 2 weeks of aspirin and then discontinue. 4. Eliquis to be managed by primary care  5. I believe it is okay to go forward with an echo at this point but should not inhibit her from going home. We will need follow-up in the office with me in 1 to 2 weeks.              Nabila Millan is a 77 y.o. female I am seeing for elevated troponin in the setting of a pulmonary embolus. Past medical history is significant for Covid in August on home O2 still. She now presents with shortness of breath no chest pain and tachycardia. She is on 2 L of oxygen at home and had to get increase that today. She was on antibiotics and steroids up until 2 weeks ago. Symptoms have been worsening and O2 sats were down. In the emergency room her D-dimer was elevated and her CT scan demonstrated pulmonary emboli. No Known Allergies      Past Medical History:   Diagnosis Date    COVID     Hospitalized in Aug 2021; unvaccinated     DVT (deep venous thrombosis) ([facility] [address]     16 years ago. Was on coumadin         No past surgical history on file. .Home Medications:  Prior to Admission Medications   Prescriptions Last Dose Informant Patient Reported? Taking?   zinc 50 mg tab tablet 10/26/2021 at Unknown time Self Yes Yes   Sig: Take 50 mg by mouth daily.       Facility-Administered Medications: None       Hospital Medications:  Current Facility-Administered Medications   Medication Dose Route Frequency    heparin 25,000 units in D5W 250 ml infusion  11-36 Units/kg/hr IntraVENous TITRATE    sodium chloride (NS) flush 5-40 mL  5-40 mL IntraVENous Q8H    sodium chloride (NS) flush 5-40 mL  5-40 mL IntraVENous PRN    acetaminophen (TYLENOL) tablet 650 mg  650 mg Oral Q4H PRN    HYDROcodone-acetaminophen (NORCO) 5-325 mg per tablet 1 Tablet  1 Tablet Oral Q4H PRN    morphine injection 2 mg  2 mg IntraVENous Q4H PRN    naloxone (NARCAN) injection 0.4 mg  0.4 mg IntraVENous PRN    ondansetron (ZOFRAN) injection 4 mg  4 mg IntraVENous Q4H PRN    hydrALAZINE (APRESOLINE) 20 mg/mL injection 10 mg  10 mg IntraVENous Q6H PRN    insulin lispro (HUMALOG) injection   SubCUTAneous AC&HS    glucose chewable tablet 16 g  4 Tablet Oral PRN    dextrose (D50W) injection syrg 12.5-25 g  12.5-25 g IntraVENous PRN    glucagon (GLUCAGEN) injection 1 mg  1 mg IntraMUSCular PRN    aspirin chewable tablet 81 mg  81 mg Oral DAILY          OBJECTIVE       Laboratory and Imaging have been reviewed and are notable for          Diagnostic Tests:     No results for input(s): CPK, CKMB, TROIQ in the last 72 hours. No lab exists for component: CKQMB, CPKMB  Recent Labs     10/28/21  0540 10/27/21  2153 10/27/21  1501     --  140   K 4.2  --  4.1   CO2 28  --  25   BUN 6  --  6   CREA 0.74  --  0.72   *  --  216*   MG 2.0  --   --    WBC 4.1 4.8 4.8   HGB 11.4* 11.6 12.0   HCT 34.9* 35.1 36.1    222 213         Cardiac work up to date:  No specialty comments available. Social History:  Social History     Tobacco Use    Smoking status: Never Smoker    Smokeless tobacco: Never Used   Substance Use Topics    Alcohol use: Not Currently       Family History:  No family history on file. Review of Symptoms:  A comprehensive review of systems was negative except for that written in the HPI. Physical Exam:      Visit Vitals  BP (!) 140/85 (BP 1 Location: Right upper arm, BP Patient Position: At rest)   Pulse 95   Temp 97.7 °F (36.5 °C)   Resp 29   Ht 5' 7\" (1.702 m)   Wt 297 lb (134.7 kg)   SpO2 95%   BMI 46.52 kg/m²     General Appearance:  Well developed, well nourished,alert and oriented x 3, and individual in no acute distress. Ears/Nose/Mouth/Throat:   Hearing grossly normal.Normal oral mucosa,no scleral icterus     Neck: Supple no JVD or bruits,no cervical lymphadenopathy   Chest:   Lungs clear to auscultation bilaterally,no rales rhonchi or wheezing   Cardiovascular:  Regular rate and rhythm, S1, S2 normal,  Murmur. PMI nondisplaced   Abdomen:   Soft, non-tender, bowel sounds are active. No abdominal bruits   Extremities: No edema bilaterally. Pulses detected, no varicosities   Skin: Warm and dry.  No bruising  Neuro  Moves all extermities and neurologically intact                                                       I have discussed the diagnosis with the patient and the intended plan as seen in the above orders. Questions were answered concerning future plans. I have discussed medication side effects and warnings with the patient as well. Mckenzie Szymanski is in agreement to the plan listed above and wishes to proceed. she  was instructed not to smoke, eat heart healthy diet  and to exercise. Thank you for the consult and the opportunity to be involved in the care of Mckenzie Szymanski.         Erin Arshad MD

## 2021-10-28 NOTE — PROGRESS NOTES
Reason for Admission:  Pulmonary embolism                     RUR Score:   13%                  Plan for utilizing home health:      none    PCP: First and Last name:  Catarina Alvarenga     Name of Practice:    Are you a current patient: Yes/No: yes   Approximate date of last visit:  9/7/21   Can you participate in a virtual visit with your PCP:  no                    Current Advanced Directive/Advance Care Plan: Full Code  No AD; her  is her next of kin    Healthcare Decision Maker:   Click here to [address] including selection of the Healthcare Decision Maker Relationship (ie \"Primary\")                             Transition of Care Plan:    Pt is independent with ADL's, drives and works parttime. She lives with her  in a 1 level house with 4 entry steps. Pt was given a 30-day Eliquis coupon; called pt's pharmacy and was informed the prescription needs a prior auth. Dr Cee Weeks & pt notified. Pt gets her medications from [address] in [place]. Pt was given a list of [employer]. 1.pt's  to transport her home at d/c  2. Consult to cards  3. Pt to f/u OP  4. Pt on 2L O2 at home (from [facility]) since she had covid in August 2021.  5. CM following for any needs    Care Management Interventions  PCP Verified by CM: Yes  Mode of Transport at Discharge: Other (see comment)  Transition of Care Consult (CM Consult): Other  Discharge Durable Medical Equipment: No (Pt is on 2L O2 at home (from [facility]))  Support Systems: Spouse/Significant Other  Confirm Follow Up Transport: Family  Discharge Location  Discharge Placement: Home with family assistance  MENDEL Valenzuela Counter

## 2021-10-29 ENCOUNTER — APPOINTMENT (OUTPATIENT)
Dept: NON INVASIVE DIAGNOSTICS | Age: 66
DRG: 175 | End: 2021-10-29
Attending: INTERNAL MEDICINE
Payer: MEDICARE

## 2021-10-29 LAB
APTT PPP: 64.6 SEC (ref 22.1–31)
APTT PPP: 69 SEC (ref 22.1–31)
ECHO AV ANNULUS DIAM: 2.82 CM
ECHO AV AREA PEAK VELOCITY: 2.82 CM2
ECHO AV AREA/BSA PEAK VELOCITY: 1.2 CM2/M2
ECHO AV PEAK GRADIENT: 8.69 MMHG
ECHO AV PEAK VELOCITY: 147.37 CM/S
ECHO LA MAJOR AXIS: 3.81 CM
ECHO LA MINOR AXIS: 1.59 CM
ECHO LV E' LATERAL VELOCITY: 7.35 CM/S
ECHO LV E' SEPTAL VELOCITY: 5.74 CM/S
ECHO LV INTERNAL DIMENSION DIASTOLIC: 4.14 CM (ref 3.9–5.3)
ECHO LV INTERNAL DIMENSION SYSTOLIC: 2.92 CM
ECHO LV IVSD: 1.54 CM (ref 0.6–0.9)
ECHO LV MASS 2D: 197.4 G (ref 67–162)
ECHO LV MASS INDEX 2D: 82.6 G/M2 (ref 43–95)
ECHO LV POSTERIOR WALL DIASTOLIC: 1.07 CM (ref 0.6–0.9)
ECHO LVOT DIAM: 2.11 CM
ECHO LVOT PEAK GRADIENT: 5.65 MMHG
ECHO LVOT PEAK VELOCITY: 118.85 CM/S
ECHO MV A VELOCITY: 81.64 CM/S
ECHO MV E DECELERATION TIME (DT): 194.94 MS
ECHO MV E VELOCITY: 47.09 CM/S
ECHO MV E/A RATIO: 0.58
ECHO MV E/E' LATERAL: 6.41
ECHO MV E/E' RATIO (AVERAGED): 7.31
ECHO MV E/E' SEPTAL: 8.2
ECHO PV PEAK INSTANTANEOUS GRADIENT SYSTOLIC: 2.11 MMHG
ECHO TV REGURGITANT MAX VELOCITY: 257.65 CM/S
ECHO TV REGURGITANT PEAK GRADIENT: 26.55 MMHG
GLUCOSE BLD STRIP.AUTO-MCNC: 149 MG/DL (ref 65–117)
GLUCOSE BLD STRIP.AUTO-MCNC: 159 MG/DL (ref 65–117)
GLUCOSE BLD STRIP.AUTO-MCNC: 181 MG/DL (ref 65–117)
GLUCOSE BLD STRIP.AUTO-MCNC: 182 MG/DL (ref 65–117)
SERVICE CMNT-IMP: ABNORMAL
THERAPEUTIC RANGE,PTTT: ABNORMAL SECS (ref 58–77)
THERAPEUTIC RANGE,PTTT: ABNORMAL SECS (ref 58–77)

## 2021-10-29 PROCEDURE — 74011636637 HC RX REV CODE- 636/637: Performed by: INTERNAL MEDICINE

## 2021-10-29 PROCEDURE — 77010033678 HC OXYGEN DAILY

## 2021-10-29 PROCEDURE — 74011250637 HC RX REV CODE- 250/637: Performed by: INTERNAL MEDICINE

## 2021-10-29 PROCEDURE — 93306 TTE W/DOPPLER COMPLETE: CPT | Performed by: SPECIALIST

## 2021-10-29 PROCEDURE — 82962 GLUCOSE BLOOD TEST: CPT

## 2021-10-29 PROCEDURE — 74011250636 HC RX REV CODE- 250/636: Performed by: EMERGENCY MEDICINE

## 2021-10-29 PROCEDURE — 65660000000 HC RM CCU STEPDOWN

## 2021-10-29 PROCEDURE — 94760 N-INVAS EAR/PLS OXIMETRY 1: CPT

## 2021-10-29 PROCEDURE — 36415 COLL VENOUS BLD VENIPUNCTURE: CPT

## 2021-10-29 PROCEDURE — 85730 THROMBOPLASTIN TIME PARTIAL: CPT

## 2021-10-29 PROCEDURE — 93306 TTE W/DOPPLER COMPLETE: CPT

## 2021-10-29 RX ADMIN — Medication 10 ML: at 05:32

## 2021-10-29 RX ADMIN — HEPARIN SODIUM 16 UNITS/KG/HR: 10000 INJECTION, SOLUTION INTRAVENOUS at 19:22

## 2021-10-29 RX ADMIN — INSULIN LISPRO 2 UNITS: 100 INJECTION, SOLUTION INTRAVENOUS; SUBCUTANEOUS at 11:40

## 2021-10-29 RX ADMIN — HEPARIN SODIUM 16 UNITS/KG/HR: 10000 INJECTION, SOLUTION INTRAVENOUS at 08:36

## 2021-10-29 RX ADMIN — INSULIN LISPRO 2 UNITS: 100 INJECTION, SOLUTION INTRAVENOUS; SUBCUTANEOUS at 16:49

## 2021-10-29 RX ADMIN — ASPIRIN 81 MG: 81 TABLET, CHEWABLE ORAL at 09:09

## 2021-10-29 RX ADMIN — Medication 10 ML: at 14:02

## 2021-10-29 RX ADMIN — INSULIN LISPRO 2 UNITS: 100 INJECTION, SOLUTION INTRAVENOUS; SUBCUTANEOUS at 09:09

## 2021-10-29 RX ADMIN — Medication 10 ML: at 21:54

## 2021-10-29 NOTE — ROUTINE PROCESS
Bedside and Verbal shift change report given to David Corcoran (oncoming nurse) by Ariella Velarde (offgoing nurse). Report included the following information SBAR, Kardex, ED Summary, Procedure Summary, Intake/Output, MAR, Recent Results and Cardiac Rhythm NSR.

## 2021-10-29 NOTE — PROGRESS NOTES
Transition of Care Plan:  RUR-14%  1. Pt's  to transport her home at d/c  2. Cards (has Eliquis 30 day coupon), diabetes management following  3. PT/OT following  3. Pt to f/u OP [facility] providers as scheduled  4. Pt on 2L O2 at home (from [facility]) since she had covid in August 2021; needs O2 eval prior to d/c  5. CM following for any needs  MENDEL Serrano RN

## 2021-10-29 NOTE — DIABETES MGMT
[address] [facility]    CLINICAL NURSE SPECIALIST CONSULT     Initial Presentation   Chad Price is a 77 y.o. female arrived in the emergency department for tachypnea and shortness of breath that has been growing worse over the past couple days patient diagnosed with COVID-19 in August and has been wearing oxygen since then patient was found to have pulmonary emboli on assessment in the emergency department patient's A1c was incidently elevated on admission  HX:   Past Medical History:   Diagnosis Date    COVID     Hospitalized in Aug 2021; unvaccinated     DVT (deep venous thrombosis) ([facility] [address]     16 years ago. Was on coumadin         INITIAL DX: Pulmonary embolism ([facility] [address] [I26.99]     Current Treatment     TX: Heparin gtt. Oxygen therapy  Consulted by Provider for advanced diabetes nursing assessment and care for:   [x] Home management assessment  [x] Survival skill education    Hospital Course   Admitted on 10/28 for pulmonary emboli with hypoxemia    Diabetes History   Pt has no diabetes history of notes. Appears to be no family history of any kind    Subjective   I never been told I have diabetes or pre diabetes. I try to avoid doctors in general    Eating pattern  Breakfast- eggs and sausage  Lunch sandwiches   Dinner- Different things spaghetti to things on the grill    [x] Snacks Choalate as my weakness  [x] Beverages  Tea or water  Physical activity pattern   Challenging since having COVID, pt reports being fairly active in her          Objective   Physical exam  General Obese female in no acute distress/. Conversant and cooperative  Neuro  Alert, oriented   Vital Signs   Visit Vitals  /66 (BP 1 Location: Left lower arm, BP Patient Position: At rest)   Pulse 93   Temp 97.8 °F (36.6 °C)   Resp 16   Ht 5' 7\" (1.702 m)   Wt 132 kg (291 lb 0.1 oz)   SpO2 95%   BMI 45.58 kg/m²     Skin  Warm and dry. Acanthosis noted along neckline.  No lipohypertrophy or lipoatrophy noted at injection sites   Heart   Regular rate and rhythm. No murmurs, rubs or gallops  Lungs  Clear to auscultation without rales or rhonchi  Extremities No foot wounds       Laboratory  Recent Results (from the past 12 hour(s))   PTT    Collection Time: 10/29/21  1:14 AM   Result Value Ref Range    aPTT 69.0 (H) 22.1 - 31.0 sec    aPTT, therapeutic range     58.0 - 77.0 SECS   PTT    Collection Time: 10/29/21  8:31 AM   Result Value Ref Range    aPTT 64.6 (H) 22.1 - 31.0 sec    aPTT, therapeutic range     58.0 - 77.0 SECS   GLUCOSE, POC    Collection Time: 10/29/21  8:59 AM   Result Value Ref Range    Glucose (POC) 182 (H) 65 - 117 mg/dL    Performed by Toro Lozano impacting BG management  Factor Dose Comments   Nutrition:  Standard meals  Tube feeding via OG/NG/PEG  TPN   60 grams/meal      Drugs:  Steroids   Pt on oral steroids for approximately 2 months for COVID treatment      Infection COVID in August      Blood glucose pattern          Assessment and Plan   Nursing Diagnosis Risk for unstable blood glucose pattern   Nursing Intervention Domain 5250 Decision-making Support   Nursing Interventions Examined current inpatient diabetes control   Explored factors facilitating and impeding inpatient management  Identified self-management practices impeding diabetes control  Explored corrective strategies with patient and responsible inpatient provider   Informed patient of rational for insulin strategy while hospitalized  Instructed patient on  1. What diabetes in   2. Precipitating factor: steroids, obesity,  3. Diet and exercise impact on BG   4. Impact of BG on healing                Evaluation   This is a pleasant 77year old female with a complicated clinical course due to COVID PNA. Pt was diagnosed, hospitalized for COVID in August and since has had multiple challenges. Pt has been on NC at 3 liters and steroids for the last two months.  On admission this time patient was found to have pulmonary emboli and elevated troponin. BG was 216 on arrival  and A1c was done and was 8.2%. this would suggest new diagnosis of diabetes. In light of the multiple months of steroid use, the A1c number may not be most accurate. Pt has has met impatient BG goal of 100-180 on minimal correction with a pattern of 156-180. In light of BG pattern and A1c that is not ideal a conservative approach would be warranted. Pt is willing to change lifestyle and diet to address elevated BG patterns and would encourage this first line     This patient would benefit from diabetes self-management education and support [facility]) after discharge. Recommendations   1. POC glucose ACHS  2. Consistent carbohydrate diet (60 grams CHO/meal)    [x] Use of Subcutaneous Insulin Order set (6550)  Insulin  Current Dosing Specific recommendation   Basal                                      (Based on weight, BMI & GFR) None 20 units Lantus  if BG pattern remains above 250    10 Units NPH Q12 linked,ONLY  if steroids are started and BG remains over 200    Corrective                                       (Useful in adjusting insulin dosing) [x] Normal sensitivity   Continue        Discharge Recommendations   Until seen by primary care provider,  1. Portion controled Carb consistent meals  2. Engage in regular walking unless contraindicated by history or current health status  3  POC BG daily to provide trend for primary care provider  4. Referrals  [x] [facility] for diabetes self-management training      Billing Code(s)   [x] 56163 IP subsequent hospital care - 35 minutes    Before making these care recommendations, I personally reviewed the hospitalization record, including notes, laboratory & diagnostic data and current medications, and examined the patient at the bedside (circumstances permitting) before making care recommendations.      Total minutes: 176 ALEXUS Sinha  Diabetes Clinical Nurse Specialist  [facility]  Access via [facility] [employer]

## 2021-10-29 NOTE — PROGRESS NOTES
0700: Bedside shift change report given to Shayy Coburn (oncoming nurse) by Vanessa San RN (offgoing nurse). Report included the following information SBAR, Kardex, Procedure Summary, Intake/Output, MAR and Cardiac Rhythm NSR. This patient was assisted with Intentional Toileting every 2 hours during this shift as appropriate. Documentation of ambulation and output reflected on Flowsheet as appropriate. Purposeful hourly rounding was completed using AIDET and 5Ps. Outcomes of PHR documented as they occurred. Bed alarm in use as appropriate. Dual Suction and ambubag in place. 0840: Two therapeutic doses achieved on heparin gtt. Per protocol, next aPTT due at 0836 tomorrow morning.

## 2021-10-29 NOTE — PROGRESS NOTES
Problem: Falls - Risk of  Goal: *Absence of Falls  Description: Document Susan Felipe Fall Risk and appropriate interventions in the flowsheet. Outcome: Progressing Towards Goal  Note: Fall Risk Interventions:            Medication Interventions: Bed/chair exit alarm, Patient to call before getting OOB    Elimination Interventions: Bed/chair exit alarm, Call light in reach, Patient to call for help with toileting needs              Problem: Patient Education: Go to Patient Education Activity  Goal: Patient/Family Education  Outcome: Progressing Towards Goal     Problem: Pulmonary Embolism Care Plan (Adult)  Goal: *Improvement of existing pulmonary embolism  Outcome: Progressing Towards Goal  Goal: *Absence of bleeding  Outcome: Progressing Towards Goal  Goal: *Labs within defined limits  Outcome: Progressing Towards Goal     Problem: Patient Education: Go to Patient Education Activity  Goal: Patient/Family Education  Outcome: Progressing Towards Goal     Problem: Diabetes Self-Management  Goal: *Disease process and treatment process  Description: Define diabetes and identify own type of diabetes; list 3 options for treating diabetes. Outcome: Progressing Towards Goal  Goal: *Incorporating nutritional management into lifestyle  Description: Describe effect of type, amount and timing of food on blood glucose; list 3 methods for planning meals. Outcome: Progressing Towards Goal  Goal: *Incorporating physical activity into lifestyle  Description: State effect of exercise on blood glucose levels. Outcome: Progressing Towards Goal  Goal: *Developing strategies to promote health/change behavior  Description: Define the ABC's of diabetes; identify appropriate screenings, schedule and personal plan for screenings. Outcome: Progressing Towards Goal  Goal: *Using medications safely  Description: State effect of diabetes medications on diabetes; name diabetes medication taking, action and side effects.   Outcome: Progressing Towards Goal  Goal: *Monitoring blood glucose, interpreting and using results  Description: Identify recommended blood glucose targets  and personal targets. Outcome: Progressing Towards Goal  Goal: *Prevention, detection, treatment of acute complications  Description: List symptoms of hyper- and hypoglycemia; describe how to treat low blood sugar and actions for lowering  high blood glucose level. Outcome: Progressing Towards Goal  Goal: *Prevention, detection and treatment of chronic complications  Description: Define the natural course of diabetes and describe the relationship of blood glucose levels to long term complications of diabetes.   Outcome: Progressing Towards Goal  Goal: *Developing strategies to address psychosocial issues  Description: Describe feelings about living with diabetes; identify support needed and support network  Outcome: Progressing Towards Goal  Goal: *Insulin pump training  Outcome: Progressing Towards Goal  Goal: *Sick day guidelines  Outcome: Progressing Towards Goal  Goal: *Patient Specific Goal (EDIT GOAL, INSERT TEXT)  Outcome: Progressing Towards Goal     Problem: Patient Education: Go to Patient Education Activity  Goal: Patient/Family Education  Outcome: Progressing Towards Goal

## 2021-10-29 NOTE — PROGRESS NOTES
Bedside shift change report given to Neva Carpio (oncoming nurse) by Jaleesa Deutsch (offgoing nurse). Report included the following information SBAR, Kardex, ED Summary, Intake/Output, and Recent Results. 0430: RN attempted to draw labs, pt told RN that she would only allow one attempt at her blood draw. RN called nursing supervisor, who was busy, but said she would come up when she had a second    Bedside shift change report given to [facility] (oncoming nurse) by Neva Carpio (offgoing nurse). Report included the following information SBAR, Kardex, ED Summary, Intake/Output, and Recent Results. This patient was assisted with Intentional Toileting every 2 hours during this shift as appropriate. Documentation of ambulation and output reflected on Flowsheet as appropriate. Purposeful hourly rounding was completed using AIDET and 5Ps. Outcomes of PHR documented as they occurred. Bed alarm in use as appropriate. Dual Suction and ambubag in place.

## 2021-10-29 NOTE — PROGRESS NOTES
Rian Perez [facility] [address]  [address], [place], [address] [facility]  [phone]      Medical Progress Note      NAME: Ray Pacheco   :  1955  MRM:  [id number]    Date of service: 10/29/2021  7:15 AM       Assessment and Plan:   1. Pulmonary emboli - likely provoked by recent COVID infection (in August) and suspect may have a hypercoagulable disorder. 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 On heparin gtt for now with a plan of switching to DOAC, but insurance couldn't approve it. Eval for O2 requirement prior to d/c     2. Acute on chronic respiratory failure with hypoxia. Pt was on 2L O2 after she was diagnosed with COVID. Continue supplement O2 to keep SAO2 > 90%     3.  Elevated troponin. likely heart strain due to demand supply mismatch. Trending down. Check TTE. Evaluated by cardiology. 4.  Elevated BP - not on outpt BP meds. Now better controlled. 5.  Elevated BG, new DM type 2- A1c 8.2. BG checks AC TID and qHS. Consult DTC     6. Morbid obesity - likely driving elevated BP, BG, etc. Advised weight loss. Subjective:     Chief Complaint[de-identified] Patient was seen and examined as a follow up for PE. Chart was reviewed. feels weel. Denies chest pain, but tachycardic . ROS:  (bold if positive, if negative)    Tolerating PT  Tolerating Diet        Objective:     Last 24hrs VS reviewed since prior progress note.  Most recent are:    Visit Vitals  /66 (BP 1 Location: Left lower arm, BP Patient Position: At rest)   Pulse 93   Temp 97.8 °F (36.6 °C)   Resp 16   Ht 5' 7\" (1.702 m)   Wt 132 kg (291 lb 0.1 oz)   SpO2 95%   BMI 45.58 kg/m²     SpO2 Readings from Last 6 Encounters:   10/29/21 95%   21 94%   21 92%    O2 Flow Rate (L/min): 3 l/min       Intake/Output Summary (Last 24 hours) at 10/29/2021 0856  Last data filed at 10/29/2021 0648  Gross per 24 hour   Intake 349.84 ml Output 900 ml   Net -550.16 ml        Physical Exam:    Gen:  obese, in no acute distress  HEENT:  Pink conjunctivae, PERRL, hearing intact to voice, moist mucous membranes  Neck:  Supple, without masses, thyroid non-tender  Resp:  No accessory muscle use, clear breath sounds without wheezes rales or rhonchi  Card:  No murmurs, normal S1, S2 without thrills, bruits or peripheral edema  Abd:  Soft, non-tender, non-distended, normoactive bowel sounds are present, no palpable organomegaly and no detectable hernias  Lymph:  No cervical or inguinal adenopathy  Musc:  No cyanosis or clubbing  Skin:  No rashes or ulcers, skin turgor is good  Neuro:  Cranial nerves are grossly intact, no focal motor weakness, follows commands appropriately  Psych:  Good insight, oriented to person, place and time, alert  __________________________________________________________________  Medications Reviewed: (see below)  Medications:     Current Facility-Administered Medications   Medication Dose Route Frequency    heparin 25,000 units in D5W 250 ml infusion  11-36 Units/kg/hr IntraVENous TITRATE    sodium chloride (NS) flush 5-40 mL  5-40 mL IntraVENous Q8H    sodium chloride (NS) flush 5-40 mL  5-40 mL IntraVENous PRN    acetaminophen (TYLENOL) tablet 650 mg  650 mg Oral Q4H PRN    HYDROcodone-acetaminophen (NORCO) 5-325 mg per tablet 1 Tablet  1 Tablet Oral Q4H PRN    morphine injection 2 mg  2 mg IntraVENous Q4H PRN    naloxone (NARCAN) injection 0.4 mg  0.4 mg IntraVENous PRN    ondansetron (ZOFRAN) injection 4 mg  4 mg IntraVENous Q4H PRN    hydrALAZINE (APRESOLINE) 20 mg/mL injection 10 mg  10 mg IntraVENous Q6H PRN    insulin lispro (HUMALOG) injection   SubCUTAneous AC&HS    glucose chewable tablet 16 g  4 Tablet Oral PRN    dextrose (D50W) injection syrg 12.5-25 g  12.5-25 g IntraVENous PRN    glucagon (GLUCAGEN) injection 1 mg  1 mg IntraMUSCular PRN    aspirin chewable tablet 81 mg  81 mg Oral DAILY        Lab Data Reviewed: (see below)  Lab Review:     Recent Labs     10/28/21  0540 10/27/21  2153 10/27/21  1501   WBC 4.1 4.8 4.8   HGB 11.4* 11.6 12.0   HCT 34.9* 35.1 36.1    222 213     Recent Labs     10/28/21  0540 10/27/21  1501    140   K 4.2 4.1   * 108   CO2 28 25   * 216*   BUN 6 6   CREA 0.74 0.72   CA 7.9* 8.5   MG 2.0  --    ALB  --  2.8*   TBILI  --  0.6   ALT  --  33     Lab Results   Component Value Date/Time    Glucose (POC) 211 (H) 10/28/2021 07:37 PM    Glucose (POC) 161 (H) 10/28/2021 04:16 PM    Glucose (POC) 150 (H) 10/28/2021 11:44 AM    Glucose (POC) 192 (H) 10/28/2021 08:25 AM    Glucose (POC) 175 (H) 10/27/2021 09:25 PM     No results for input(s): PH, PCO2, PO2, HCO3, FIO2 in the last 72 hours. No results for input(s): INR, INREXT, INREXT in the last 72 hours. All Micro Results     Procedure Component Value Units Date/Time    COVID-19 RAPID TEST [[id number]]     Order Status: Canceled           I have reviewed notes of prior 24hr. Other pertinent lab: Total time spent with patient: 28 I personally reviewed chart, notes, data and current medications in the medical record. I have personally examined and treated the patient at bedside during this period.                  Care Plan discussed with: Patient, Nursing Staff and >50% of time spent in counseling and coordination of care    Discussed:  Care Plan    Prophylaxis:  Hep SQ    Disposition:  Home w/Family           ___________________________________________________    Attending Physician: Meghann Crawford MD

## 2021-10-30 VITALS
WEIGHT: 293 LBS | OXYGEN SATURATION: 97 % | HEIGHT: 67 IN | RESPIRATION RATE: 16 BRPM | BODY MASS INDEX: 45.99 KG/M2 | HEART RATE: 98 BPM | SYSTOLIC BLOOD PRESSURE: 130 MMHG | TEMPERATURE: 97.6 F | DIASTOLIC BLOOD PRESSURE: 90 MMHG

## 2021-10-30 LAB
APTT PPP: 40.7 SEC (ref 22.1–31)
ERYTHROCYTE [DISTWIDTH] IN BLOOD BY AUTOMATED COUNT: 17.2 % (ref 11.5–14.5)
GLUCOSE BLD STRIP.AUTO-MCNC: 132 MG/DL (ref 65–117)
GLUCOSE BLD STRIP.AUTO-MCNC: 167 MG/DL (ref 65–117)
HCT VFR BLD AUTO: 35 % (ref 35–47)
HGB BLD-MCNC: 11.4 G/DL (ref 11.5–16)
MCH RBC QN AUTO: 29.8 PG (ref 26–34)
MCHC RBC AUTO-ENTMCNC: 32.6 G/DL (ref 30–36.5)
MCV RBC AUTO: 91.4 FL (ref 80–99)
NRBC # BLD: 0.03 K/UL (ref 0–0.01)
NRBC BLD-RTO: 0.8 PER 100 WBC
PLATELET # BLD AUTO: 237 K/UL (ref 150–400)
PMV BLD AUTO: 9.8 FL (ref 8.9–12.9)
RBC # BLD AUTO: 3.83 M/UL (ref 3.8–5.2)
SERVICE CMNT-IMP: ABNORMAL
SERVICE CMNT-IMP: ABNORMAL
THERAPEUTIC RANGE,PTTT: ABNORMAL SECS (ref 58–77)
WBC # BLD AUTO: 4 K/UL (ref 3.6–11)

## 2021-10-30 PROCEDURE — 85027 COMPLETE CBC AUTOMATED: CPT

## 2021-10-30 PROCEDURE — 85730 THROMBOPLASTIN TIME PARTIAL: CPT

## 2021-10-30 PROCEDURE — 74011250637 HC RX REV CODE- 250/637: Performed by: INTERNAL MEDICINE

## 2021-10-30 PROCEDURE — 82962 GLUCOSE BLOOD TEST: CPT

## 2021-10-30 PROCEDURE — 74011636637 HC RX REV CODE- 636/637: Performed by: INTERNAL MEDICINE

## 2021-10-30 PROCEDURE — 36415 COLL VENOUS BLD VENIPUNCTURE: CPT

## 2021-10-30 PROCEDURE — 97530 THERAPEUTIC ACTIVITIES: CPT | Performed by: PHYSICAL THERAPIST

## 2021-10-30 PROCEDURE — 97161 PT EVAL LOW COMPLEX 20 MIN: CPT | Performed by: PHYSICAL THERAPIST

## 2021-10-30 RX ADMIN — ASPIRIN 81 MG: 81 TABLET, CHEWABLE ORAL at 08:34

## 2021-10-30 RX ADMIN — INSULIN LISPRO 2 UNITS: 100 INJECTION, SOLUTION INTRAVENOUS; SUBCUTANEOUS at 08:34

## 2021-10-30 RX ADMIN — Medication 10 ML: at 05:29

## 2021-10-30 RX ADMIN — ACETAMINOPHEN 650 MG: 325 TABLET ORAL at 08:34

## 2021-10-30 RX ADMIN — APIXABAN 10 MG: 5 TABLET, FILM COATED ORAL at 11:07

## 2021-10-30 RX ADMIN — Medication 10 ML: at 09:35

## 2021-10-30 NOTE — PROGRESS NOTES
PHYSICAL THERAPY EVALUATION/DISCHARGE  Patient: Adair Ellison (89 y.o. female)  Date: 10/30/2021  Primary Diagnosis: Pulmonary embolism (HCC) [I26.99]        Precautions: cardiac         ASSESSMENT  Based on the objective data described below, the patient presents with limited activity tolerance and overall good functional mobility. She is safe upright tho desats with activity when not using O2 (drops to mid 80s with toileting & 40' walk. Returns to 90 within 2 minutes at rest & on room air. HR change appropriate) . Extensive ed/ guidance provided re monitoring at home and slow progression/ rehab minimizing her reliance upon O2. Demo/ verbalize good knowledge for self-management. She is ready to discharge. Functional Outcome Measure: The patient scored 85 on the barthel outcome measure which is indicative of limited mobility distance. Other factors to consider for discharge: support ; good knowledge level given recent covid pulm home management        PLAN :  Recommendation for discharge: (in order for the patient to meet his/her long term goals)  No skilled physical therapy/ follow up rehabilitation needs identified at this time. This discharge recommendation:  Has not yet been discussed the attending provider and/or case management    IF patient discharges home will need the following DME: none       SUBJECTIVE:   Patient stated I've been watching my O2 sats.     OBJECTIVE DATA SUMMARY:   HISTORY:    Past Medical History:   Diagnosis Date    COVID     Hospitalized in Aug 2021; unvaccinated     DVT (deep venous thrombosis) ([facility] [address]     16 years ago. Was on coumadin    No past surgical history on file.     Prior level of function: limited due to covid; improving  Personal factors and/or comorbidities impacting plan of care: covid hx, obesity    Home Situation  Home Environment: Private residence  One/Two Story Residence: One story  Living Alone: No  Support Systems: Spouse/Significant Other  Patient Expects to be Discharged to[de-identified] House  Current DME Used/Available at Home: None    EXAMINATION/PRESENTATION/DECISION MAKING:   Critical Behavior:   axox4           Hearing: Auditory  Auditory Impairment: None  Skin:  no problem noted  Edema: as above  Range Of Motion:  AROM: Within functional limits                       Strength:    Strength: Within functional limits                    Tone & Sensation:   Tone: Normal              Sensation: Intact               Coordination:  Coordination: Within functional limits  Vision:      Functional Mobility:  Bed Mobility:  Rolling: Independent  Supine to Sit: Modified independent  Sit to Supine: Modified independent  Scooting: Independent  Transfers:  Sit to Stand: Stand-by assistance  Stand to Sit: Stand-by assistance                       Balance:   Sitting: Intact  Standing: Intact; Without support  Ambulation/Gait Training:  Distance (ft): 40 Feet (ft)  Assistive Device:  (none)  Ambulation - Level of Assistance: Stand-by assistance        Gait Abnormalities: Trunk sway increased        Base of Support: Widened     Speed/Ayla: Slow  Step Length: Right shortened;Left shortened             Functional Measure:  Barthel Index:    Bathin  Bladder: 10  Bowels: 10  Groomin  Dressing: 10  Feeding: 10  Mobility: 0  Stairs: 10  Toilet Use: 10  Transfer (Bed to Chair and Back): 15  Total: 85/100       The Barthel ADL Index: Guidelines  1. The index should be used as a record of what a patient does, not as a record of what a patient could do. 2. The main aim is to establish degree of independence from any help, physical or verbal, however minor and for whatever reason. 3. The need for supervision renders the patient not independent. 4. A patient's performance should be established using the best available evidence. Asking the patient, friends/relatives and nurses are the usual sources, but direct observation and common sense are also important.  However direct testing is not needed. 5. Usually the patient's performance over the preceding 24-48 hours is important, but occasionally longer periods will be relevant. 6. Middle categories imply that the patient supplies over 50 per cent of the effort. 7. Use of aids to be independent is allowed. Carmine Mueller., Barthel, D.W. (0765). Functional evaluation: the Barthel Index. [address] [facility] (14)2. ABDIAZIZ Villasenor, Moncho Ybarra., Kadie Morel., [place], [address] [place] [address] (1999). Measuring the change indisability after inpatient rehabilitation; comparison of the responsiveness of the Barthel Index and Functional [place] Measure. Journal of Neurology, Neurosurgery, and Psychiatry, 66(4), 547-574. TEN Richardson.CARMEN, ILIR Zhu, & Lord Medina MJEOVANNY. (2004.) Assessment of post-stroke quality of life in cost-effectiveness studies: The usefulness of the Barthel Index and the EuroQoL-5D. Quality of Life Research, 15, 572-47           Physical Therapy Evaluation Charge Determination   History Examination Presentation Decision-Making   MEDIUM  Complexity : 1-2 comorbidities / personal factors will impact the outcome/ POC  MEDIUM Complexity : 3 Standardized tests and measures addressing body structure, function, activity limitation and / or participation in recreation  LOW Complexity : Stable, uncomplicated  Other outcome measures barthel  LOW       Based on the above components, the patient evaluation is determined to be of the following complexity level: LOW     Pain Rating:  none    Activity Tolerance:   desaturates with exertion and requires oxygen      After treatment patient left in no apparent distress:   Supine in bed, Call bell within reach, and Caregiver / family present    COMMUNICATION/EDUCATION:   The patients plan of care was discussed with: Registered nurse and joyce . Fall prevention education was provided and the patient/caregiver indicated understanding.  and Patient/family have participated as able in goal setting and plan of care.     Thank you for this referral.  Kat Grady, PT   Time Calculation: 29 mins

## 2021-10-30 NOTE — DISCHARGE INSTRUCTIONS
ACUTE DIAGNOSES:  Pulmonary embolism ([facility].) [I26.99]    CHRONIC MEDICAL DIAGNOSES:  Problem List as of 10/30/2021 Never Reviewed        Codes Class Noted - Resolved    * (Principal) Pulmonary embolism ([facility].) ICD-10-CM: I26.99  ICD-9-CM: 415.19  10/27/2021 - Present        Elevated BP without diagnosis of hypertension ICD-10-CM: R03.0  ICD-9-CM: 796.2  10/27/2021 - Present        Elevated glucose ICD-10-CM: R73.09  ICD-9-CM: 790.29  10/27/2021 - Present        Morbidly obese ([facility] ICD-10-CM: E66.01  ICD-9-CM: 278.01  10/27/2021 - Present        NSTEMI (non-ST elevated myocardial infarction) ([place] [address].) ICD-10-CM: I21.4  ICD-9-CM: 410.70  10/27/2021 - Present        Hypoxia ICD-10-CM: R09.02  ICD-9-CM: 799.02  8/19/2021 - Present        COVID-19 ICD-10-CM: U07.1  ICD-9-CM: 079.89  8/19/2021 - Present              DISCHARGE MEDICATIONS:          · It is important that you take the medication exactly as they are prescribed. · Keep your medication in the bottles provided by the pharmacist and keep a list of the medication names, dosages, and times to be taken in your wallet. · Do not take other medications without consulting your doctor. DIET:  Diabetic Diet    ACTIVITY: Activity as tolerated    ADDITIONAL INFORMATION: If you experience any of the following symptoms then please call your primary care physician or return to the emergency room if you cannot get hold of your doctor: Fever, chills, nausea, vomiting, diarrhea, change in mentation, falling, bleeding, shortness of breath. FOLLOW UP CARE:  Dr. Connor Lazo MD  you are to call and set up an appointment to see them in 5 days. Information obtained by :  I understand that if any problems occur once I am at home I am to contact my physician. I understand and acknowledge receipt of the instructions indicated above. Physician's or R.N.'s Signature                                                                  Date/Time                                                                                                                                              Patient or Representative Signature                                                          Date/Time    Thank you for allowing us to provide you with medical care today. We realize that you have many choices for your emergency care needs. We thank you for choosing [facility]. Please choose us in the future for any continued health care needs. We hope we addressed all of your medical concerns. We strive to provide excellent quality care in the Emergency Department. Anything less than excellent does not meet our expectations. The exam and treatment you received in the Emergency Department were for an emergent problem and are not intended as complete care. It is important that you follow up with a doctor, nurse practitioner, or physician's assistant for ongoing care. If your symptoms worsen or you do not improve as expected and you are unable to reach your usual health care provider, you should return to the Emergency Department. We are available 24 hours a day. Take this sheet with you when you go to your follow-up visit. If you have any problem arranging the follow-up visit, contact the Emergency Department immediately. Make an appointment your family doctor for follow up of this visit. Return to the ER if you are unable to be seen in a timely manner.

## 2021-10-30 NOTE — DISCHARGE SUMMARY
Hospitalist Discharge Summary     Patient ID:    Ladonna Fitch  [id number]  77 y.o.  1955    Admit date of service: 10/27/2021    Discharge date of service: 10/30/2021    Admission Diagnoses: Pulmonary embolism ([facility] [I26.99]    Chronic Diagnoses:    Problem List as of 10/30/2021 Never Reviewed        Codes Class Noted - Resolved    * (Principal) Pulmonary embolism ([facility] ICD-10-CM: I26.99  ICD-9-CM: 415.19  10/27/2021 - Present        Elevated BP without diagnosis of hypertension ICD-10-CM: R03.0  ICD-9-CM: 796.2  10/27/2021 - Present        Elevated glucose ICD-10-CM: R73.09  ICD-9-CM: 790.29  10/27/2021 - Present        Morbidly obese ([facility] ICD-10-CM: E66.01  ICD-9-CM: 278.01  10/27/2021 - Present        NSTEMI (non-ST elevated myocardial infarction) ([facility] ICD-10-CM: I21.4  ICD-9-CM: 410.70  10/27/2021 - Present        Hypoxia ICD-10-CM: R09.02  ICD-9-CM: 799.02  8/19/2021 - Present        COVID-19 ICD-10-CM: U07.1  ICD-9-CM: 079.89  8/19/2021 - Present              Discharge Medications:   Current Discharge Medication List      START taking these medications    Details   apixaban (Eliquis) 5 mg tablet Please take 10mg BID x 7 days then 5mg BID thereafter  Qty: 45 Tablet, Refills: 0         CONTINUE these medications which have NOT CHANGED    Details   zinc 50 mg tab tablet Take 50 mg by mouth daily. Follow up Care:    1. Bobbi Gatica MD in 1-2 weeks  2. Diet:  Diabetic Diet    Disposition:  Home. Advanced Directive:    Discharge Exam:  See today's note.     CONSULTATIONS: None    Significant Diagnostic Studies:   Recent Labs     10/30/21  0520 10/28/21  0540   WBC 4.0 4.1   HGB 11.4* 11.4*   HCT 35.0 34.9*    211     Recent Labs     10/28/21  0540 10/27/21  1501    140   K 4.2 4.1   * 108   CO2 28 25   BUN 6 6   CREA 0.74 0.72   * 216*   CA 7.9* 8.5   MG 2.0  --      Recent Labs     10/27/21  1501   ALT 33   AP 61   TBILI 0.6   TP 5.8*   ALB 2.8*   GLOB 3.0     Recent Labs     10/30/21  0520 10/29/21  0831 10/29/21  0114   APTT 40.7* 64.6* 69.0*      No results for input(s): FE, TIBC, PSAT, FERR in the last 72 hours. No results for input(s): PH, PCO2, PO2 in the last 72 hours. No results for input(s): CPK, CKMB in the last 72 hours. No lab exists for component: TROPONINI  Lab Results   Component Value Date/Time    Glucose (POC) 132 (H) 10/30/2021 12:02 PM    Glucose (POC) 167 (H) 10/30/2021 07:33 AM    Glucose (POC) 159 (H) 10/29/2021 09:16 PM    Glucose (POC) 149 (H) 10/29/2021 04:24 PM    Glucose (POC) 181 (H) 10/29/2021 11:31 AM             HOSPITAL COURSE & TREATMENT RENDERED:   1. Pulmonary emboli - likely provoked by recent COVID infection (in August) and suspect may have a hypercoagulable disorder. 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 Was on heparin gtt and switched to eliquis. Pt has O2 already which she was getting after she was diagnosed with COVID.       2. Acute on chronic respiratory failure with hypoxia. Pt was on 2L O2 after she was diagnosed with COVID. Continue supplement O2 to keep SAO2 > 90%     3.  Elevated troponin. likely heart strain due to demand supply mismatch. Trending down. Check TTE. Evaluated by cardiology. 4.  Elevated BP - not on outpt BP meds. Now better controlled. 5.  Elevated BG, new DM type 2- A1c 8.2. BG. Evaluated by DTC. Pt thought this is due to steroid she was taking during her covid. Advised to FU with PCP.      6. Morbid obesity - likely driving elevated BP, BG, etc. Advised weight loss. Discharged in improved condition.     Spent 35 minutes    Signed:  Bryson Lewis MD  10/30/2021  12:57 PM

## 2021-10-30 NOTE — PROGRESS NOTES
Met with patient this morning to sign IM letter prior to potential discharge and awaiting PT recommendations. PT recommended no follow up needed, home oxygen not needed this discharge as she has needed equipment at home. Family transported home. Care Management Interventions  PCP Verified by CM: Yes  Mode of Transport at Discharge: Other (see comment)  Transition of Care Consult (CM Consult):  Other  Discharge Durable Medical Equipment: No (Pt is on 2L O2 at home (from [facility]  Support Systems: Spouse/Significant Other  Confirm Follow Up Transport: Family  Discharge Location  Discharge Placement: Home with family assistance

## 2021-10-30 NOTE — PROGRESS NOTES
Bedside and Verbal shift change report given to GM RN  (oncoming nurse) by HR RN  (offgoing nurse). Report included the following information SBAR, Kardex and Recent Results. .. This patient was assisted with Intentional Toileting every 2 hours during this shift as appropriate. Documentation of ambulation and output reflected on Flowsheet as appropriate. Purposeful hourly rounding was completed using AIDET and 5Ps. Outcomes of PHR documented as they occurred. Bed alarm in use as appropriate. Dual Suction and ambubag in place. .. I have reviewed discharge instructions with the patient and spouse. The patient and spouse verbalized understanding. .. 1345-  Discharged the pt via [facility] with family. Mandy Scott

## 2022-01-06 ENCOUNTER — TRANSCRIBE ORDER (OUTPATIENT)
Dept: SCHEDULING | Age: 67
End: 2022-01-06

## 2022-01-06 DIAGNOSIS — I26.99 PULMONARY INFARCTION (HCC): Primary | ICD-10-CM

## 2022-03-18 PROBLEM — U07.1 COVID-19: Status: ACTIVE | Noted: 2021-08-19

## 2022-03-19 PROBLEM — I21.4 NSTEMI (NON-ST ELEVATED MYOCARDIAL INFARCTION) (HCC): Status: ACTIVE | Noted: 2021-10-27

## 2022-03-19 PROBLEM — R09.02 HYPOXIA: Status: ACTIVE | Noted: 2021-08-19

## 2022-03-19 PROBLEM — R03.0 ELEVATED BP WITHOUT DIAGNOSIS OF HYPERTENSION: Status: ACTIVE | Noted: 2021-10-27

## 2022-03-19 PROBLEM — R73.09 ELEVATED GLUCOSE: Status: ACTIVE | Noted: 2021-10-27

## 2022-03-19 PROBLEM — I26.99 PULMONARY EMBOLISM (HCC): Status: ACTIVE | Noted: 2021-10-27

## 2022-03-20 PROBLEM — E66.01 MORBIDLY OBESE (HCC): Status: ACTIVE | Noted: 2021-10-27

## 2022-11-05 NOTE — PROGRESS NOTES
This patient was assisted with Intentional Toileting every 2 hours during this shift as appropriate. Documentation of ambulation and output reflected on Flowsheet as appropriate. Purposeful hourly rounding was completed using AIDET and 5Ps. Outcomes of PHR documented as they occurred. Bed alarm in use as appropriate. Dual Suction and ambubag in place. Attending with

## 2024-02-08 ENCOUNTER — APPOINTMENT (OUTPATIENT)
Facility: HOSPITAL | Age: 69
DRG: 375 | End: 2024-02-08
Payer: MEDICARE

## 2024-02-08 ENCOUNTER — HOSPITAL ENCOUNTER (INPATIENT)
Facility: HOSPITAL | Age: 69
LOS: 1 days | Discharge: HOME | DRG: 375 | End: 2024-02-09
Attending: STUDENT IN AN ORGANIZED HEALTH CARE EDUCATION/TRAINING PROGRAM | Admitting: FAMILY MEDICINE
Payer: MEDICARE

## 2024-02-08 DIAGNOSIS — R10.32 LEFT LOWER QUADRANT ABDOMINAL PAIN: Primary | ICD-10-CM

## 2024-02-08 DIAGNOSIS — R18.8 OTHER ASCITES: ICD-10-CM

## 2024-02-08 PROBLEM — R14.0 ABDOMINAL DISTENTION: Status: ACTIVE | Noted: 2024-02-08

## 2024-02-08 PROBLEM — R10.9 ACUTE ABDOMINAL PAIN: Status: ACTIVE | Noted: 2024-02-08

## 2024-02-08 LAB
ALBUMIN SERPL-MCNC: 3.3 G/DL (ref 3.5–5)
ALBUMIN/GLOB SERPL: 0.8 (ref 1.1–2.2)
ALP SERPL-CCNC: 55 U/L (ref 45–117)
ALT SERPL-CCNC: 9 U/L (ref 12–78)
ANION GAP SERPL CALC-SCNC: 10 MMOL/L (ref 5–15)
APPEARANCE UR: ABNORMAL
AST SERPL W P-5'-P-CCNC: 10 U/L (ref 15–37)
BACTERIA URNS QL MICRO: ABNORMAL /HPF
BASOPHILS # BLD: 0 K/UL (ref 0–0.1)
BASOPHILS NFR BLD: 0 % (ref 0–1)
BILIRUB SERPL-MCNC: 0.5 MG/DL (ref 0.2–1)
BILIRUB UR QL: NEGATIVE
BUN SERPL-MCNC: 10 MG/DL (ref 6–20)
BUN/CREAT SERPL: 11 (ref 12–20)
CA-I BLD-MCNC: 9 MG/DL (ref 8.5–10.1)
CHLORIDE SERPL-SCNC: 98 MMOL/L (ref 97–108)
CO2 SERPL-SCNC: 28 MMOL/L (ref 21–32)
COLOR UR: YELLOW
CREAT SERPL-MCNC: 0.91 MG/DL (ref 0.55–1.02)
DIFFERENTIAL METHOD BLD: NORMAL
EOSINOPHIL # BLD: 0.1 K/UL (ref 0–0.4)
EOSINOPHIL NFR BLD: 1 % (ref 0–7)
EPITH CASTS URNS QL MICRO: ABNORMAL /LPF
ERYTHROCYTE [DISTWIDTH] IN BLOOD BY AUTOMATED COUNT: 12.5 % (ref 11.5–14.5)
GLOBULIN SER CALC-MCNC: 4.3 G/DL (ref 2–4)
GLUCOSE SERPL-MCNC: 112 MG/DL (ref 65–100)
GLUCOSE UR STRIP.AUTO-MCNC: NEGATIVE MG/DL
HCT VFR BLD AUTO: 46 % (ref 35–47)
HGB BLD-MCNC: 14.9 G/DL (ref 11.5–16)
HGB UR QL STRIP: NEGATIVE
IMM GRANULOCYTES # BLD AUTO: 0 K/UL (ref 0–0.04)
IMM GRANULOCYTES NFR BLD AUTO: 0 % (ref 0–0.5)
KETONES UR QL STRIP.AUTO: NEGATIVE MG/DL
LEUKOCYTE ESTERASE UR QL STRIP.AUTO: ABNORMAL
LYMPHOCYTES # BLD: 1.6 K/UL (ref 0.8–3.5)
LYMPHOCYTES NFR BLD: 19 % (ref 12–49)
MCH RBC QN AUTO: 28.9 PG (ref 26–34)
MCHC RBC AUTO-ENTMCNC: 32.4 G/DL (ref 30–36.5)
MCV RBC AUTO: 89.1 FL (ref 80–99)
MONOCYTES # BLD: 0.7 K/UL (ref 0–1)
MONOCYTES NFR BLD: 8 % (ref 5–13)
NEUTS SEG # BLD: 5.9 K/UL (ref 1.8–8)
NEUTS SEG NFR BLD: 72 % (ref 32–75)
NITRITE UR QL STRIP.AUTO: NEGATIVE
PH UR STRIP: 5 (ref 5–8)
PLATELET # BLD AUTO: 289 K/UL (ref 150–400)
PMV BLD AUTO: 10.4 FL (ref 8.9–12.9)
POTASSIUM SERPL-SCNC: 4.1 MMOL/L (ref 3.5–5.1)
PROT SERPL-MCNC: 7.6 G/DL (ref 6.4–8.2)
PROT UR STRIP-MCNC: ABNORMAL MG/DL
RBC # BLD AUTO: 5.16 M/UL (ref 3.8–5.2)
RBC #/AREA URNS HPF: ABNORMAL /HPF (ref 0–5)
SODIUM SERPL-SCNC: 136 MMOL/L (ref 136–145)
SP GR UR REFRACTOMETRY: 1.02 (ref 1–1.03)
UROBILINOGEN UR QL STRIP.AUTO: 0.1 EU/DL (ref 0.2–1)
WBC # BLD AUTO: 8.3 K/UL (ref 3.6–11)
WBC URNS QL MICRO: ABNORMAL /HPF (ref 0–4)

## 2024-02-08 PROCEDURE — 36415 COLL VENOUS BLD VENIPUNCTURE: CPT

## 2024-02-08 PROCEDURE — 81001 URINALYSIS AUTO W/SCOPE: CPT

## 2024-02-08 PROCEDURE — 99285 EMERGENCY DEPT VISIT HI MDM: CPT

## 2024-02-08 PROCEDURE — 6360000004 HC RX CONTRAST MEDICATION: Performed by: STUDENT IN AN ORGANIZED HEALTH CARE EDUCATION/TRAINING PROGRAM

## 2024-02-08 PROCEDURE — 80053 COMPREHEN METABOLIC PANEL: CPT

## 2024-02-08 PROCEDURE — 85025 COMPLETE CBC W/AUTO DIFF WBC: CPT

## 2024-02-08 PROCEDURE — 74177 CT ABD & PELVIS W/CONTRAST: CPT

## 2024-02-08 PROCEDURE — 1100000000 HC RM PRIVATE

## 2024-02-08 PROCEDURE — 2580000003 HC RX 258: Performed by: FAMILY MEDICINE

## 2024-02-08 RX ORDER — SODIUM CHLORIDE 0.9 % (FLUSH) 0.9 %
5-40 SYRINGE (ML) INJECTION EVERY 12 HOURS SCHEDULED
Status: DISCONTINUED | OUTPATIENT
Start: 2024-02-08 | End: 2024-02-09 | Stop reason: HOSPADM

## 2024-02-08 RX ORDER — ONDANSETRON 4 MG/1
4 TABLET, ORALLY DISINTEGRATING ORAL EVERY 8 HOURS PRN
Status: DISCONTINUED | OUTPATIENT
Start: 2024-02-08 | End: 2024-02-09 | Stop reason: HOSPADM

## 2024-02-08 RX ORDER — ACETAMINOPHEN 650 MG/1
650 SUPPOSITORY RECTAL EVERY 6 HOURS PRN
Status: DISCONTINUED | OUTPATIENT
Start: 2024-02-08 | End: 2024-02-09 | Stop reason: HOSPADM

## 2024-02-08 RX ORDER — POTASSIUM CHLORIDE 20 MEQ/1
40 TABLET, EXTENDED RELEASE ORAL PRN
Status: DISCONTINUED | OUTPATIENT
Start: 2024-02-08 | End: 2024-02-09 | Stop reason: HOSPADM

## 2024-02-08 RX ORDER — MAGNESIUM SULFATE IN WATER 40 MG/ML
2000 INJECTION, SOLUTION INTRAVENOUS PRN
Status: DISCONTINUED | OUTPATIENT
Start: 2024-02-08 | End: 2024-02-09 | Stop reason: HOSPADM

## 2024-02-08 RX ORDER — SODIUM CHLORIDE 0.9 % (FLUSH) 0.9 %
5-40 SYRINGE (ML) INJECTION PRN
Status: DISCONTINUED | OUTPATIENT
Start: 2024-02-08 | End: 2024-02-09 | Stop reason: HOSPADM

## 2024-02-08 RX ORDER — ACETAMINOPHEN 325 MG/1
650 TABLET ORAL EVERY 6 HOURS PRN
Status: DISCONTINUED | OUTPATIENT
Start: 2024-02-08 | End: 2024-02-09 | Stop reason: HOSPADM

## 2024-02-08 RX ORDER — ONDANSETRON 2 MG/ML
4 INJECTION INTRAMUSCULAR; INTRAVENOUS EVERY 6 HOURS PRN
Status: DISCONTINUED | OUTPATIENT
Start: 2024-02-08 | End: 2024-02-09 | Stop reason: HOSPADM

## 2024-02-08 RX ORDER — DICYCLOMINE HYDROCHLORIDE 10 MG/1
10 CAPSULE ORAL
Qty: 30 CAPSULE | Refills: 3 | Status: SHIPPED | OUTPATIENT
Start: 2024-02-08

## 2024-02-08 RX ORDER — POTASSIUM CHLORIDE 7.45 MG/ML
10 INJECTION INTRAVENOUS PRN
Status: DISCONTINUED | OUTPATIENT
Start: 2024-02-08 | End: 2024-02-09 | Stop reason: HOSPADM

## 2024-02-08 RX ORDER — OXYCODONE HYDROCHLORIDE AND ACETAMINOPHEN 5; 325 MG/1; MG/1
1 TABLET ORAL EVERY 6 HOURS PRN
Qty: 10 TABLET | Refills: 0 | Status: SHIPPED | OUTPATIENT
Start: 2024-02-08 | End: 2024-02-11

## 2024-02-08 RX ORDER — SODIUM CHLORIDE 9 MG/ML
INJECTION, SOLUTION INTRAVENOUS PRN
Status: DISCONTINUED | OUTPATIENT
Start: 2024-02-08 | End: 2024-02-09 | Stop reason: HOSPADM

## 2024-02-08 RX ORDER — POLYETHYLENE GLYCOL 3350 17 G/17G
17 POWDER, FOR SOLUTION ORAL DAILY PRN
Status: DISCONTINUED | OUTPATIENT
Start: 2024-02-08 | End: 2024-02-09 | Stop reason: HOSPADM

## 2024-02-08 RX ADMIN — IOPAMIDOL 100 ML: 755 INJECTION, SOLUTION INTRAVENOUS at 18:35

## 2024-02-08 RX ADMIN — SODIUM CHLORIDE, PRESERVATIVE FREE 10 ML: 5 INJECTION INTRAVENOUS at 23:26

## 2024-02-08 ASSESSMENT — PAIN DESCRIPTION - ORIENTATION: ORIENTATION: LOWER

## 2024-02-08 ASSESSMENT — PAIN DESCRIPTION - FREQUENCY: FREQUENCY: CONTINUOUS

## 2024-02-08 ASSESSMENT — PAIN DESCRIPTION - PAIN TYPE: TYPE: ACUTE PAIN

## 2024-02-08 ASSESSMENT — PAIN DESCRIPTION - DESCRIPTORS: DESCRIPTORS: SORE

## 2024-02-08 ASSESSMENT — PAIN - FUNCTIONAL ASSESSMENT
PAIN_FUNCTIONAL_ASSESSMENT: ACTIVITIES ARE NOT PREVENTED
PAIN_FUNCTIONAL_ASSESSMENT: NONE - DENIES PAIN
PAIN_FUNCTIONAL_ASSESSMENT: 0-10

## 2024-02-08 ASSESSMENT — PAIN DESCRIPTION - LOCATION: LOCATION: ABDOMEN

## 2024-02-08 ASSESSMENT — PAIN DESCRIPTION - ONSET: ONSET: GRADUAL

## 2024-02-08 ASSESSMENT — LIFESTYLE VARIABLES
HOW OFTEN DO YOU HAVE A DRINK CONTAINING ALCOHOL: NEVER
HOW MANY STANDARD DRINKS CONTAINING ALCOHOL DO YOU HAVE ON A TYPICAL DAY: PATIENT DOES NOT DRINK

## 2024-02-08 ASSESSMENT — PAIN SCALES - GENERAL
PAINLEVEL_OUTOF10: 0
PAINLEVEL_OUTOF10: 5

## 2024-02-08 NOTE — ED PROVIDER NOTES
[facility] EMERGENCY DEPT  EMERGENCY DEPARTMENT HISTORY AND PHYSICAL EXAM      Date: 2/8/2024  Patient Name: Astrid Aguero  MRN: [id number]  YOB: 1955  Date of evaluation: 2/8/2024  Provider: Jose Sorenson MD   Note Started: 6:14 PM EST 2/8/24    HISTORY OF PRESENT ILLNESS     Chief Complaint   Patient presents with    Abdominal Pain       History Provided By: Patient    HPI: Astrid Aguero is a 68 y.o. female presents to the emergency department for evaluation of abdominal discomfort.  Patient states over the last 2 weeks she has been having intermittent episodes of lower abdominal pain.  Described as aching intermittent pain from her left lower quadrant rating to right.  Patient does have history of appendectomy.  Noticed straining to use the restroom.  No note of any diarrhea no nausea or vomiting, no fevers chills.    PAST MEDICAL HISTORY   Past Medical History:  Past Medical History:   Diagnosis Date    COVID     Hospitalized in Aug 2021; unvaccinated     DVT (deep venous thrombosis) (HCC)     16 years ago. Was on coumadin        Past Surgical History:  History reviewed. No pertinent surgical history.    Family History:  History reviewed. No pertinent family history.    Social History:  Social History     Tobacco Use    Smoking status: Never    Smokeless tobacco: Never   Substance Use Topics    Alcohol use: Not Currently    Drug use: Never       Allergies:  No Known Allergies    PCP: Edgardo Harp MD    Current Meds:   Current Facility-Administered Medications   Medication Dose Route Frequency Provider Last Rate Last Admin    [Held by provider] apixaban (ELIQUIS) tablet 5 mg  5 mg Oral BID Edgardo Harp MD        sodium chloride flush 0.9 % injection 5-40 mL  5-40 mL IntraVENous 2 times per day Edgardo Harp MD   10 mL at 02/09/24 0812    sodium chloride flush 0.9 % injection 5-40 mL  5-40 mL IntraVENous PRN Edgardo Harp MD        0.9 % sodium chloride infusion   IntraVENous PRN

## 2024-02-08 NOTE — ED TRIAGE NOTES
Pt reports abdominal pain x 2 weeks since straining to have BM. LBM 3 days ago that was not normal. No n/v/d. Taking miralax without result.

## 2024-02-08 NOTE — DISCHARGE INSTRUCTIONS
Discharge Instructions       PATIENT ID: Astrid Aguero  MRN: [id number]   YOB: 1955    DATE OF ADMISSION: 2/8/2024  DATE OF DISCHARGE: 2/9/2024    PRIMARY CARE PROVIDER: [unfilled]     ATTENDING PHYSICIAN: Edgardo Harp MD   DISCHARGING PROVIDER: Edgardo Harp MD    To contact this individual call [phone] and ask the  to page.   If unavailable, ask to be transferred the Adult Hospitalist Department.    DISCHARGE DIAGNOSES ascites    CONSULTATIONS: [unfilled]      PROCEDURES/SURGERIES: * No surgery found *    PENDING TEST RESULTS:   At the time of discharge the following test results are still pending: Follow-up the biopsy results and blood work results    FOLLOW UP APPOINTMENTS:   Edgardo Harp MD  [facility] [place] [address] [phone]    In 1 week  As needed    Edgardo Harp MD  [facility] [address] [place] [address] [phone]    Schedule an appointment as soon as possible for a visit in 1 week(s)         ADDITIONAL CARE RECOMMENDATIONS: Need to follow-up with the GI    DIET: Resume previous diet      ACTIVITY: Activity as tolerated    Wound care: {Discharge Wound Care Instructions:59780}    EQUIPMENT needed: ***      DISCHARGE MEDICATIONS:   See Medication Reconciliation Form    It is important that you take the medication exactly as they are prescribed.   Keep your medication in the bottles provided by the pharmacist and keep a list of the medication names, dosages, and times to be taken in your wallet.   Do not take other medications without consulting your doctor.       NOTIFY YOUR PHYSICIAN FOR ANY OF THE FOLLOWING:   Fever over 101 degrees for 24 hours.   Chest pain, shortness of breath, fever, chills, nausea, vomiting, diarrhea, change in mentation, falling, weakness, bleeding. Severe pain or pain not relieved by medications.  Or, any other signs or symptoms that you may have questions about.      DISPOSITION:    Home

## 2024-02-09 ENCOUNTER — APPOINTMENT (OUTPATIENT)
Facility: HOSPITAL | Age: 69
DRG: 375 | End: 2024-02-09
Payer: MEDICARE

## 2024-02-09 VITALS
WEIGHT: 293 LBS | TEMPERATURE: 97.9 F | RESPIRATION RATE: 16 BRPM | SYSTOLIC BLOOD PRESSURE: 113 MMHG | DIASTOLIC BLOOD PRESSURE: 75 MMHG | HEART RATE: 96 BPM | BODY MASS INDEX: 45.99 KG/M2 | HEIGHT: 67 IN | OXYGEN SATURATION: 97 %

## 2024-02-09 LAB
ALBUMIN SERPL-MCNC: 3 G/DL (ref 3.5–5)
ALBUMIN/GLOB SERPL: 0.9 (ref 1.1–2.2)
ALP SERPL-CCNC: 47 U/L (ref 45–117)
ALT SERPL-CCNC: 10 U/L (ref 12–78)
ANION GAP SERPL CALC-SCNC: 6 MMOL/L (ref 5–15)
AST SERPL W P-5'-P-CCNC: 9 U/L (ref 15–37)
BASOPHILS # BLD: 0 K/UL (ref 0–0.1)
BASOPHILS NFR BLD: 0 % (ref 0–1)
BILIRUB SERPL-MCNC: 0.5 MG/DL (ref 0.2–1)
BUN SERPL-MCNC: 9 MG/DL (ref 6–20)
BUN/CREAT SERPL: 12 (ref 12–20)
CA-I BLD-MCNC: 9.1 MG/DL (ref 8.5–10.1)
CANCER AG125 SERPL-ACNC: 106 U/ML (ref 1.5–35)
CEA SERPL-MCNC: <0.2 NG/ML
CHLORIDE SERPL-SCNC: 107 MMOL/L (ref 97–108)
CO2 SERPL-SCNC: 27 MMOL/L (ref 21–32)
CREAT SERPL-MCNC: 0.76 MG/DL (ref 0.55–1.02)
DIFFERENTIAL METHOD BLD: NORMAL
EOSINOPHIL # BLD: 0.1 K/UL (ref 0–0.4)
EOSINOPHIL NFR BLD: 1 % (ref 0–7)
ERYTHROCYTE [DISTWIDTH] IN BLOOD BY AUTOMATED COUNT: 12.5 % (ref 11.5–14.5)
GLOBULIN SER CALC-MCNC: 3.4 G/DL (ref 2–4)
GLUCOSE SERPL-MCNC: 116 MG/DL (ref 65–100)
HCT VFR BLD AUTO: 42.4 % (ref 35–47)
HGB BLD-MCNC: 13.7 G/DL (ref 11.5–16)
IMM GRANULOCYTES # BLD AUTO: 0 K/UL (ref 0–0.04)
IMM GRANULOCYTES NFR BLD AUTO: 0 % (ref 0–0.5)
LYMPHOCYTES # BLD: 1.4 K/UL (ref 0.8–3.5)
LYMPHOCYTES NFR BLD: 20 % (ref 12–49)
MCH RBC QN AUTO: 28.3 PG (ref 26–34)
MCHC RBC AUTO-ENTMCNC: 32.3 G/DL (ref 30–36.5)
MCV RBC AUTO: 87.6 FL (ref 80–99)
MONOCYTES # BLD: 0.6 K/UL (ref 0–1)
MONOCYTES NFR BLD: 9 % (ref 5–13)
NEUTS SEG # BLD: 4.8 K/UL (ref 1.8–8)
NEUTS SEG NFR BLD: 70 % (ref 32–75)
NRBC # BLD: 0 K/UL (ref 0–0.01)
NRBC BLD-RTO: 0 PER 100 WBC
PLATELET # BLD AUTO: 270 K/UL (ref 150–400)
PMV BLD AUTO: 11 FL (ref 8.9–12.9)
POTASSIUM SERPL-SCNC: 3.9 MMOL/L (ref 3.5–5.1)
PROT SERPL-MCNC: 6.4 G/DL (ref 6.4–8.2)
RBC # BLD AUTO: 4.84 M/UL (ref 3.8–5.2)
SODIUM SERPL-SCNC: 140 MMOL/L (ref 136–145)
WBC # BLD AUTO: 6.8 K/UL (ref 3.6–11)

## 2024-02-09 PROCEDURE — C1729 CATH, DRAINAGE: HCPCS

## 2024-02-09 PROCEDURE — 88341 IMHCHEM/IMCYTCHM EA ADD ANTB: CPT

## 2024-02-09 PROCEDURE — 82378 CARCINOEMBRYONIC ANTIGEN: CPT

## 2024-02-09 PROCEDURE — 2580000003 HC RX 258: Performed by: FAMILY MEDICINE

## 2024-02-09 PROCEDURE — 86304 IMMUNOASSAY TUMOR CA 125: CPT

## 2024-02-09 PROCEDURE — 88112 CYTOPATH CELL ENHANCE TECH: CPT

## 2024-02-09 PROCEDURE — 88305 TISSUE EXAM BY PATHOLOGIST: CPT

## 2024-02-09 PROCEDURE — 85025 COMPLETE CBC W/AUTO DIFF WBC: CPT

## 2024-02-09 PROCEDURE — 88342 IMHCHEM/IMCYTCHM 1ST ANTB: CPT

## 2024-02-09 PROCEDURE — 0W9G3ZZ DRAINAGE OF PERITONEAL CAVITY, PERCUTANEOUS APPROACH: ICD-10-PCS | Performed by: STUDENT IN AN ORGANIZED HEALTH CARE EDUCATION/TRAINING PROGRAM

## 2024-02-09 PROCEDURE — 80053 COMPREHEN METABOLIC PANEL: CPT

## 2024-02-09 RX ADMIN — SODIUM CHLORIDE, PRESERVATIVE FREE 10 ML: 5 INJECTION INTRAVENOUS at 08:12

## 2024-02-09 ASSESSMENT — ENCOUNTER SYMPTOMS
ABDOMINAL PAIN: 1
ABDOMINAL DISTENTION: 1
SHORTNESS OF BREATH: 1
EYES NEGATIVE: 1

## 2024-02-09 NOTE — CONSULTS
Hematology/Oncology Consult    Patient: Astrid Aguero MRN: [id number]     YOB: 1955  Age: 68 y.o.  Sex: female      HPI      Astrid Aguero is a 68 y.o. female who is being seen for carcinomatosis.    Patient states she reported to ED after 2 weeks of abdominal pain and no BM for 3 days. States she took Miralax with no effect.  Patient's family members are at bedside.  CT imaging completed for distended abdomen showed large volume ascites is now noted along with omental soft tissue infiltration. Peritoneal carcinomatosis should be considered.    Patient states in  she was told she had a fibroid the size of a football but never had it checked again or removed due to COVID.  Denies nausea, vomiting, chest pain, SOB at time of visit.    IR removed 2700 mL of fluid from the abdomen and sent off sample for cytology    Patient states mother had endometrial cancer and father had colon cancer. No blood disorders in the family to her knowledge. She has not had her mammogram. She has never had a colonoscopy and is currently the same  age as her father was when he  of colon cancer.The importance of the colonoscopy was discussed with her by Dr. Cornejo at her office visit 24. She does actually had a referral for a colonoscopy, and they encouraged her to follow through with the evaluation based on that.     Past Medical History:   Diagnosis Date    COVID     Hospitalized in Aug 2021; unvaccinated     DVT (deep venous thrombosis) (HCC)     16 years ago. Was on coumadin      History reviewed. No pertinent surgical history.   History reviewed. No pertinent family history.  Social History     Tobacco Use    Smoking status: Never    Smokeless tobacco: Never   Substance Use Topics    Alcohol use: Not Currently      Current Facility-Administered Medications   Medication Dose Route Frequency Provider Last Rate Last Admin    [Held by provider] apixaban (ELIQUIS) tablet 5 mg  5 mg Oral BID

## 2024-02-09 NOTE — CARE COORDINATION
02/09/24 1356   Service Assessment   Patient Orientation Alert and Oriented   Cognition Alert   History Provided By Patient   Primary Caregiver Self   Accompanied By/Relationship Sister at bedside   Support Systems Spouse/Significant Other   Patient's Healthcare Decision Maker is: Legal Next of Kin   PCP Verified by CM Yes   Last Visit to PCP Within last year   Prior Functional Level Independent in ADLs/IADLs   Current Functional Level Independent in ADLs/IADLs   Can patient return to prior living arrangement Yes   Ability to make needs known: Good   Family able to assist with home care needs: Yes   Would you like for me to discuss the discharge plan with any other family members/significant others, and if so, who? Yes  (spouse)   Financial Resources Medicare   Social/Functional History   Lives With Spouse   Home Layout One level   Home Access Stairs to enter with rails   Entrance Stairs - Number of Steps 4   Bathroom Shower/Tub Tub/Shower unit   Bathroom Toilet Standard   Bathroom Equipment None   Home Equipment None   Receives Help From Family   ADL Assistance Independent   Homemaking Assistance Independent   Transfer Assistance Independent   Active  Yes   Mode of Transportation Car   Occupation Full time employment   Services At/After Discharge   Transition of Care Consult (CM Consult) Discharge Planning     CM met with patient at bedside to complete discharge plan. Demos confirmed. Independent in ADL. Denies HH/IRF/SNF. Uses no DME.     DCP: Home w/family    Advance Care Planning     General Advance Care Planning (ACP) Conversation    Date of Conversation: 2/9/2024  Conducted with: Patient with Decision Making Capacity    Healthcare Decision Maker:  No healthcare decision makers have been documented.  Click here to complete HealthCare Decision Makers including selection of the Healthcare Decision Maker Relationship (ie \"Primary\")   Today we documented Decision Maker(s) consistent with Legal Next of Kin

## 2024-02-09 NOTE — PLAN OF CARE
Problem: Discharge Planning  Goal: Discharge to home or other facility with appropriate resources  2/9/2024 1511 by Julieth Centeno RN  Outcome: Adequate for Discharge  2/9/2024 1511 by Julieth Centeno RN  Outcome: Progressing  2/9/2024 1308 by Julieth Centeno RN  Outcome: Progressing     Problem: Safety - Adult  Goal: Free from fall injury  2/9/2024 1511 by Julieth Centeno RN  Outcome: Adequate for Discharge  2/9/2024 1511 by Julieth Centeno RN  Outcome: Progressing  2/9/2024 1308 by Julieth Centeno RN  Outcome: Progressing  2/9/2024 0445 by Josey Olsen RN  Outcome: Progressing  2/9/2024 0241 by Kathia Gutierrez RN  Outcome: Progressing     Problem: Pain  Goal: Verbalizes/displays adequate comfort level or baseline comfort level  2/9/2024 1511 by Julieth Centeno RN  Outcome: Adequate for Discharge  2/9/2024 1511 by Julieth Centeno RN  Outcome: Progressing  2/9/2024 1308 by Julieth Centeno RN  Outcome: Progressing  2/9/2024 0445 by Josey Olsen RN  Outcome: Progressing  2/9/2024 0241 by Kathia Gutierrez RN  Outcome: Progressing

## 2024-02-09 NOTE — H&P
History and Physical    NAME:   Astrid Aguero   :  1955   MRN:  [id number]     Date/Time: 2024 2:22 PM    Patient PCP: Edgardo Harp MD  ______________________________________________________________________       Subjective:     CHIEF COMPLAINT:   Abdominal pain for 2 weeks after straining during bowel movement      HISTORY OF PRESENT ILLNESS:     General Daily Progress Note  Patient is a 68 y.o. year old female with a chief complaint of abdominal pain for two weeks. LBM was 3 days ago that was \"not normal\". Described as aching intermittent pain from her left lower quadrant radiating to right. Patient does have history of appendectomy. Noticed straining to use the restroom. No note of any diarrhea no nausea or vomiting, no fevers or chills.     AM BP on  [id number]    CT abdomen pelvis on   Large volume ascites is now noted along with omental soft tissue infiltration.  Peritoneal carcinomatosis should be considered. Cholelithiasis. Enlarged myomatous uterus.    On  underwent paracentesis. 2700 cc strained, clear yellow.    GI Consult on   CEA/ CA 19-9 sent  Follow cytology result   Diuretic   Will follow to see egd /colonoscopy is need         Past Medical History:   Diagnosis Date    COVID     Hospitalized in Aug 2021; unvaccinated     DVT (deep venous thrombosis) (HCC)     16 years ago. Was on coumadin         History reviewed. No pertinent surgical history.    Social History     Tobacco Use    Smoking status: Never    Smokeless tobacco: Never   Substance Use Topics    Alcohol use: Not Currently        History reviewed. No pertinent family history.    No Known Allergies     Prior to Admission medications    Medication Sig Start Date End Date Taking? Authorizing Provider   dicyclomine (BENTYL) 10 MG capsule Take 1 capsule by mouth 4 times daily (before meals and nightly) 24  Yes Jose Sorenson MD   oxyCODONE-acetaminophen (PERCOCET) 5-325 MG per tablet Take 1 tablet by mouth

## 2024-02-09 NOTE — PROGRESS NOTES
4 Eyes Skin Assessment     NAME:  Astrid Aguero  YOB: 1955  MEDICAL RECORD NUMBER:  [id number]    The patient is being assessed for  Admission    I agree that at least one RN has performed a thorough Head to Toe Skin Assessment on the patient. ALL assessment sites listed below have been assessed.      Areas assessed by both nurses:    Head, Face, Ears, Shoulders, Back, Chest, Arms, Elbows, Hands, Sacrum. Buttock, Coccyx, Ischium, and Legs. Feet and Heels        Does the Patient have a Wound? No noted wound(s)       Jaleel Prevention initiated by RN: Yes  Wound Care Orders initiated by RN: No    Pressure Injury (Stage 3,4, Unstageable, DTI, NWPT, and Complex wounds) if present, place Wound referral order by RN under : No    New Ostomies, if present place, Ostomy referral order under : No     Nurse 1 eSignature: Electronically signed by Kathia Gutierrez RN on 2/9/24 at 2:32 AM EST    **SHARE this note so that the co-signing nurse can place an eSignature**    Nurse 2 eSignature: Electronically signed by Halley Ponce RN on 2/9/24 at 7:19 AM EST

## 2024-02-09 NOTE — DISCHARGE SUMMARY
Discharge Summary       PATIENT ID: Astrid Aguero  MRN: [id number]   YOB: 1955    DATE OF ADMISSION: 2/8/2024   DATE OF DISCHARGE:   PRIMARY CARE PROVIDER: [unfilled]      ATTENDING PHYSICIAN: Edgardo Harp  DISCHARGING PROVIDER: Edgardo Harp      CONSULTATIONS: IP CONSULT TO HEMATOLOGY  IP CONSULT TO GI    PROCEDURES/SURGERIES: * No surgery found *    ADMITTING DIAGNOSES:    Patient Active Problem List    Diagnosis Date Noted    Acute abdominal pain 02/08/2024    Abdominal distention 02/08/2024    Elevated glucose 10/27/2021    Pulmonary embolism (HCC) 10/27/2021    Elevated BP without diagnosis of hypertension 10/27/2021    NSTEMI (non-ST elevated myocardial infarction) (HCC) 10/27/2021    Morbidly obese (HCC) 10/27/2021    COVID-19 08/19/2021    Hypoxia 08/19/2021       DISCHARGE DIAGNOSES / PLAN:      Ascites  History of PE  Carcinomatosis        DISCHARGE MEDICATIONS:     Medication List        START taking these medications      dicyclomine 10 MG capsule  Commonly known as: BENTYL  Take 1 capsule by mouth 4 times daily (before meals and nightly)     oxyCODONE-acetaminophen 5-325 MG per tablet  Commonly known as: Percocet  Take 1 tablet by mouth every 6 hours as needed for Pain for up to 3 days. Intended supply: 3 days. Take lowest dose possible to manage pain Max Daily Amount: 4 tablets            CONTINUE taking these medications      apixaban 5 MG Tabs tablet  Commonly known as: ELIQUIS               Where to Get Your Medications        These medications were sent to [facility] [address] [place] [address] - P [phone] - F [phone]  [address] [place], [place] [id number]      Phone: [phone]   dicyclomine 10 MG capsule  oxyCODONE-acetaminophen 5-325 MG per tablet             NOTIFY YOUR PHYSICIAN FOR ANY OF THE FOLLOWING:   Fever over 101 degrees for 24 hours.   Chest pain, shortness of breath, fever, chills, nausea, vomiting, diarrhea,  change in mentation, falling, weakness, bleeding. Severe pain or pain not relieved by medications.  Or, any other signs or symptoms that you may have questions about.    DISPOSITION:  x  Home With:   OT  PT  HH  RN       Long term SNF/Inpatient Rehab    Independent/assisted living    Hospice    Other:       PATIENT CONDITION AT DISCHARGE: Stable      PHYSICAL EXAMINATION AT DISCHARGE:  General:          Alert, cooperative, no distress, appears stated age.     HEENT:           Atraumatic, anicteric sclerae, pink conjunctivae                          No oral ulcers, mucosa moist, throat clear, dentition fair  Neck:               Supple, symmetrical  Lungs:             Clear to auscultation bilaterally.  No Wheezing or Rhonchi. No rales.  Chest wall:      No tenderness  No Accessory muscle use.  Heart:              Regular  rhythm,  No  murmur   No edema  Abdomen:        Soft, non-tender. Not distended.  Bowel sounds normal  Extremities:     No cyanosis.  No clubbing,                            Skin turgor normal, Capillary refill normal  Skin:                Not pale.  Not Jaundiced  No rashes   Psych:             Not anxious or agitated.  Neurologic:      Alert, moves all extremities, answers questions appropriately and responds to commands     IR US GUIDED PARACENTESIS   Final Result   Successful ultrasound guided paracentesis yielding approximately 2700 ml of   fluid.                CT ABDOMEN PELVIS W IV CONTRAST Additional Contrast? None   Final Result   Large volume ascites is now noted along with omental soft tissue infiltration.   Peritoneal carcinomatosis should be considered. Cholelithiasis. Enlarged   myomatous uterus.           Recent Results (from the past 24 hour(s))   CBC with Auto Differential    Collection Time: 02/08/24  5:10 PM   Result Value Ref Range    WBC 8.3 3.6 - 11.0 K/uL    RBC 5.16 3.80 - 5.20 M/uL    Hemoglobin 14.9 11.5 - 16.0 g/dL    Hematocrit 46.0 35.0 - 47.0 %    MCV 89.1 80.0 - 99.0

## 2024-02-09 NOTE — OR NURSING
2700 cc clear yellow drained from abdomen. Sample to lab for cytology per Dr Harp.. report called to primary nurse

## 2024-02-09 NOTE — CONSULTS
Microscopic    Collection Time: 02/08/24  5:35 PM   Result Value Ref Range    Color, UA Yellow      Appearance Cloudy (A) Clear      Specific Gravity, UA 1.025 1.003 - 1.030      pH, Urine 5.0 5.0 - 8.0      Protein, UA Trace (A) Negative mg/dL    Glucose, UA Negative Negative mg/dL    Ketones, Urine Negative Negative mg/dL    Bilirubin Urine Negative Negative      Blood, Urine Negative Negative      Urobilinogen, Urine 0.1 (L) 0.2 - 1.0 EU/dL    Nitrite, Urine Negative Negative      Leukocyte Esterase, Urine Small (A) Negative      WBC, UA 20-50 0 - 4 /hpf    RBC, UA 0-5 0 - 5 /hpf    Epithelial Cells UA Few Few /lpf    BACTERIA, URINE 2+ (A) Negative /hpf   Comprehensive Metabolic Panel w/ Reflex to MG    Collection Time: 02/09/24  5:42 AM   Result Value Ref Range    Sodium 140 136 - 145 mmol/L    Potassium 3.9 3.5 - 5.1 mmol/L    Chloride 107 97 - 108 mmol/L    CO2 27 21 - 32 mmol/L    Anion Gap 6 5 - 15 mmol/L    Glucose 116 (H) 65 - 100 mg/dL    BUN 9 6 - 20 mg/dL    Creatinine 0.76 0.55 - 1.02 mg/dL    Bun/Cre Ratio 12 12 - 20      Est, Glom Filt Rate >60 >60 ml/min/1.73m2    Calcium 9.1 8.5 - 10.1 mg/dL    Total Bilirubin 0.5 0.2 - 1.0 mg/dL    AST 9 (L) 15 - 37 U/L    ALT 10 (L) 12 - 78 U/L    Alk Phosphatase 47 45 - 117 U/L    Total Protein 6.4 6.4 - 8.2 g/dL    Albumin 3.0 (L) 3.5 - 5.0 g/dL    Globulin 3.4 2.0 - 4.0 g/dL    Albumin/Globulin Ratio 0.9 (L) 1.1 - 2.2     CBC with Auto Differential    Collection Time: 02/09/24  5:42 AM   Result Value Ref Range    WBC 6.8 3.6 - 11.0 K/uL    RBC 4.84 3.80 - 5.20 M/uL    Hemoglobin 13.7 11.5 - 16.0 g/dL    Hematocrit 42.4 35.0 - 47.0 %    MCV 87.6 80.0 - 99.0 FL    MCH 28.3 26.0 - 34.0 PG    MCHC 32.3 30.0 - 36.5 g/dL    RDW 12.5 11.5 - 14.5 %    Platelets 270 150 - 400 K/uL    MPV 11.0 8.9 - 12.9 FL    Nucleated RBCs 0.0 0.0  WBC    nRBC 0.00 0.00 - 0.01 K/uL    Neutrophils % 70 32 - 75 %    Lymphocytes % 20 12 - 49 %    Monocytes % 9 5 - 13 %     Eosinophils % 1 0 - 7 %    Basophils % 0 0 - 1 %    Immature Granulocytes 0 0 - 0.5 %    Neutrophils Absolute 4.8 1.8 - 8.0 K/UL    Lymphocytes Absolute 1.4 0.8 - 3.5 K/UL    Monocytes Absolute 0.6 0.0 - 1.0 K/UL    Eosinophils Absolute 0.1 0.0 - 0.4 K/UL    Basophils Absolute 0.0 0.0 - 0.1 K/UL    Absolute Immature Granulocyte 0.0 0.00 - 0.04 K/UL    Differential Type AUTOMATED          CT ABDOMEN PELVIS W IV CONTRAST Additional Contrast? None   Final Result   Large volume ascites is now noted along with omental soft tissue infiltration.   Peritoneal carcinomatosis should be considered. Cholelithiasis. Enlarged   myomatous uterus.      IR US GUIDED PARACENTESIS    (Results Pending)        [unfilled]  Assessment:     presents the emergency department for 2 weeks of intermittent lower abdominal pain     CT ABDOMEN PELVIS W IV CONTRAST Additional Contrast? None      Large volume ascites   Peritoneal carcinomatosis     myomatous uterus.      IR US GUIDED PRACENTESIS    (Results Pending)     Plan:   CEA/ CA 19-9 sent  Follow cytology result   Diuretic as ordered  May need GY consult, could be Meigs syndrome  Hematology oncology consult  Will follow to see egd /colonoscopy is need, which can be done as outpatient  I spent time with the patient the patient and family member, regarding the CT findings, patient stated that she will go home today, since she been discharged, she will follow-up with her hematology /oncologist Dr. Cornejo in 1 to 2 weeks.    Signed By: Fredrick Dale MD     February 9, 2024         Thank you for allowing me to participate in this patients care    DISCLAIMER:  Please note that this report was generated to utilize Dragon Dictation system, the software is designed to speed over the accuracy.  Every effort has been done to attempt to correct this mistakes upon review, but there still might be some inadvertent errors in grammar and spelling.  Please utilize caution while reading the report due to this

## 2024-02-09 NOTE — H&P
history.    No Known Allergies     Prior to Admission medications    Medication Sig Start Date End Date Taking? Authorizing Provider   dicyclomine (BENTYL) 10 MG capsule Take 1 capsule by mouth 4 times daily (before meals and nightly) 2/8/24  Yes Jose Sorenson MD   oxyCODONE-acetaminophen (PERCOCET) 5-325 MG per tablet Take 1 tablet by mouth every 6 hours as needed for Pain for up to 3 days. Intended supply: 3 days. Take lowest dose possible to manage pain Max Daily Amount: 4 tablets 2/8/24 2/11/24 Yes Jose Sorenson MD   apixaban (ELIQUIS) 5 MG TABS tablet Please take 10mg BID x 7 days then 5mg BID thereafter 10/27/21   Automatic Reconciliation, Ar         Current Facility-Administered Medications:     [Held by provider] apixaban (ELIQUIS) tablet 5 mg, 5 mg, Oral, BID, Edgardo Hrap MD    sodium chloride flush 0.9 % injection 5-40 mL, 5-40 mL, IntraVENous, 2 times per day, Edgardo Harp MD, 10 mL at 02/09/24 0812    sodium chloride flush 0.9 % injection 5-40 mL, 5-40 mL, IntraVENous, PRN, Edgardo Harp MD    0.9 % sodium chloride infusion, , IntraVENous, PRN, Edgardo Harp MD    potassium chloride (KLOR-CON M) extended release tablet 40 mEq, 40 mEq, Oral, PRN **OR** potassium bicarb-citric acid (EFFER-K) effervescent tablet 40 mEq, 40 mEq, Oral, PRN **OR** potassium chloride 10 mEq/100 mL IVPB (Peripheral Line), 10 mEq, IntraVENous, PRN, Edgardo Harp MD    magnesium sulfate 2000 mg in 50 mL IVPB premix, 2,000 mg, IntraVENous, PRN, Edgardo Harp MD    ondansetron (ZOFRAN-ODT) disintegrating tablet 4 mg, 4 mg, Oral, Q8H PRN **OR** ondansetron (ZOFRAN) injection 4 mg, 4 mg, IntraVENous, Q6H PRN, Edgardo Harp MD    polyethylene glycol (GLYCOLAX) packet 17 g, 17 g, Oral, Daily PRN, Edgardo Harp MD    acetaminophen (TYLENOL) tablet 650 mg, 650 mg, Oral, Q6H PRN **OR** acetaminophen (TYLENOL) suppository 650 mg, 650 mg, Rectal, Q6H PRN, Edgardo Harp MD    LAB DATA REVIEWED:   yellow.      ________________________________________________________________________    Signed: Nicholas Roccoa

## 2024-02-09 NOTE — CARE COORDINATION
Transition of Care Plan:    RUR: 7%  Prior Level of Functioning: Independent  Disposition: Home  If SNF or IPR: Date FOC offered: N/A  Date FOC received:   Accepting facility:   Date authorization started with reference number:   Date authorization received and expires:   Follow up appointments: Discharge summary  DME needed: None  Transportation at discharge: Family  IM/IMM Medicare/ letter given: N/A  Is patient a  and connected with [facility]? N/A   If yes, was [facility] transfer form completed and [facility] notified?   Caregiver Contact: Per patient  Discharge Caregiver contacted prior to discharge?   Care Conference needed? N/A  Barriers to discharge: N/A

## 2024-02-12 LAB — CYTOLOGY-NON GYN: NORMAL
